# Patient Record
Sex: FEMALE | Race: BLACK OR AFRICAN AMERICAN | NOT HISPANIC OR LATINO | URBAN - METROPOLITAN AREA
[De-identification: names, ages, dates, MRNs, and addresses within clinical notes are randomized per-mention and may not be internally consistent; named-entity substitution may affect disease eponyms.]

---

## 2018-07-17 ENCOUNTER — EMERGENCY (EMERGENCY)
Facility: HOSPITAL | Age: 43
LOS: 1 days | Discharge: HOME | End: 2018-07-17
Attending: EMERGENCY MEDICINE | Admitting: EMERGENCY MEDICINE
Payer: MEDICAID

## 2018-07-17 VITALS
OXYGEN SATURATION: 100 % | TEMPERATURE: 98 F | RESPIRATION RATE: 18 BRPM | HEART RATE: 76 BPM | SYSTOLIC BLOOD PRESSURE: 113 MMHG | DIASTOLIC BLOOD PRESSURE: 65 MMHG

## 2018-07-17 VITALS
OXYGEN SATURATION: 99 % | DIASTOLIC BLOOD PRESSURE: 53 MMHG | HEART RATE: 71 BPM | SYSTOLIC BLOOD PRESSURE: 106 MMHG | RESPIRATION RATE: 17 BRPM

## 2018-07-17 DIAGNOSIS — Z98.890 OTHER SPECIFIED POSTPROCEDURAL STATES: Chronic | ICD-10-CM

## 2018-07-17 DIAGNOSIS — Z98.891 HISTORY OF UTERINE SCAR FROM PREVIOUS SURGERY: Chronic | ICD-10-CM

## 2018-07-17 LAB
ALBUMIN SERPL ELPH-MCNC: 4.9 G/DL — SIGNIFICANT CHANGE UP (ref 3.3–5)
ALP SERPL-CCNC: 67 U/L — SIGNIFICANT CHANGE UP (ref 40–120)
ALT FLD-CCNC: 9 U/L — SIGNIFICANT CHANGE UP (ref 4–33)
AST SERPL-CCNC: 46 U/L — HIGH (ref 4–32)
BASOPHILS # BLD AUTO: 0.08 K/UL — SIGNIFICANT CHANGE UP (ref 0–0.2)
BASOPHILS NFR BLD AUTO: 1.5 % — SIGNIFICANT CHANGE UP (ref 0–2)
BILIRUB SERPL-MCNC: 3.2 MG/DL — HIGH (ref 0.2–1.2)
BUN SERPL-MCNC: 9 MG/DL — SIGNIFICANT CHANGE UP (ref 7–23)
CALCIUM SERPL-MCNC: 9 MG/DL — SIGNIFICANT CHANGE UP (ref 8.4–10.5)
CHLORIDE SERPL-SCNC: 103 MMOL/L — SIGNIFICANT CHANGE UP (ref 98–107)
CO2 SERPL-SCNC: 23 MMOL/L — SIGNIFICANT CHANGE UP (ref 22–31)
CREAT SERPL-MCNC: 0.9 MG/DL — SIGNIFICANT CHANGE UP (ref 0.5–1.3)
EOSINOPHIL # BLD AUTO: 0.49 K/UL — SIGNIFICANT CHANGE UP (ref 0–0.5)
EOSINOPHIL NFR BLD AUTO: 9.5 % — HIGH (ref 0–6)
GLUCOSE SERPL-MCNC: 88 MG/DL — SIGNIFICANT CHANGE UP (ref 70–99)
HCT VFR BLD CALC: 23.9 % — LOW (ref 34.5–45)
HGB BLD-MCNC: 8.1 G/DL — LOW (ref 11.5–15.5)
IMM GRANULOCYTES # BLD AUTO: 0.02 # — SIGNIFICANT CHANGE UP
IMM GRANULOCYTES NFR BLD AUTO: 0.4 % — SIGNIFICANT CHANGE UP (ref 0–1.5)
LYMPHOCYTES # BLD AUTO: 2.49 K/UL — SIGNIFICANT CHANGE UP (ref 1–3.3)
LYMPHOCYTES # BLD AUTO: 48.1 % — HIGH (ref 13–44)
MCHC RBC-ENTMCNC: 25.6 PG — LOW (ref 27–34)
MCHC RBC-ENTMCNC: 33.9 % — SIGNIFICANT CHANGE UP (ref 32–36)
MCV RBC AUTO: 75.6 FL — LOW (ref 80–100)
MONOCYTES # BLD AUTO: 0.67 K/UL — SIGNIFICANT CHANGE UP (ref 0–0.9)
MONOCYTES NFR BLD AUTO: 12.9 % — SIGNIFICANT CHANGE UP (ref 2–14)
NEUTROPHILS # BLD AUTO: 1.43 K/UL — LOW (ref 1.8–7.4)
NEUTROPHILS NFR BLD AUTO: 27.6 % — LOW (ref 43–77)
NRBC # FLD: 0.06 — SIGNIFICANT CHANGE UP
NRBC FLD-RTO: 1.2 — SIGNIFICANT CHANGE UP
PLATELET # BLD AUTO: 259 K/UL — SIGNIFICANT CHANGE UP (ref 150–400)
PMV BLD: 9.4 FL — SIGNIFICANT CHANGE UP (ref 7–13)
POTASSIUM SERPL-MCNC: 4.2 MMOL/L — SIGNIFICANT CHANGE UP (ref 3.5–5.3)
POTASSIUM SERPL-SCNC: 4.2 MMOL/L — SIGNIFICANT CHANGE UP (ref 3.5–5.3)
PROT SERPL-MCNC: 8.8 G/DL — HIGH (ref 6–8.3)
RBC # BLD: 3.16 M/UL — LOW (ref 3.8–5.2)
RBC # FLD: 13.3 % — SIGNIFICANT CHANGE UP (ref 10.3–14.5)
RETICS #: 126 K/UL — HIGH (ref 25–125)
RETICS/RBC NFR: 4 % — HIGH (ref 0.5–2.5)
SICKLE CELL DISEASE SCREENING TEST: POSITIVE — SIGNIFICANT CHANGE UP
SODIUM SERPL-SCNC: 139 MMOL/L — SIGNIFICANT CHANGE UP (ref 135–145)
WBC # BLD: 5.18 K/UL — SIGNIFICANT CHANGE UP (ref 3.8–10.5)
WBC # FLD AUTO: 5.18 K/UL — SIGNIFICANT CHANGE UP (ref 3.8–10.5)

## 2018-07-17 PROCEDURE — 99285 EMERGENCY DEPT VISIT HI MDM: CPT

## 2018-07-17 RX ORDER — METOCLOPRAMIDE HCL 10 MG
10 TABLET ORAL ONCE
Qty: 0 | Refills: 0 | Status: COMPLETED | OUTPATIENT
Start: 2018-07-17 | End: 2018-07-17

## 2018-07-17 RX ORDER — HYDROMORPHONE HYDROCHLORIDE 2 MG/ML
1 INJECTION INTRAMUSCULAR; INTRAVENOUS; SUBCUTANEOUS ONCE
Qty: 0 | Refills: 0 | Status: DISCONTINUED | OUTPATIENT
Start: 2018-07-17 | End: 2018-07-17

## 2018-07-17 RX ORDER — MORPHINE SULFATE 50 MG/1
12 CAPSULE, EXTENDED RELEASE ORAL ONCE
Qty: 0 | Refills: 0 | Status: DISCONTINUED | OUTPATIENT
Start: 2018-07-17 | End: 2018-07-17

## 2018-07-17 RX ORDER — MORPHINE SULFATE 50 MG/1
8 CAPSULE, EXTENDED RELEASE ORAL ONCE
Qty: 0 | Refills: 0 | Status: DISCONTINUED | OUTPATIENT
Start: 2018-07-17 | End: 2018-07-17

## 2018-07-17 RX ORDER — METOCLOPRAMIDE HCL 10 MG
10 TABLET ORAL ONCE
Qty: 0 | Refills: 0 | Status: DISCONTINUED | OUTPATIENT
Start: 2018-07-17 | End: 2018-07-17

## 2018-07-17 RX ORDER — SODIUM CHLORIDE 9 MG/ML
1000 INJECTION INTRAMUSCULAR; INTRAVENOUS; SUBCUTANEOUS ONCE
Qty: 0 | Refills: 0 | Status: COMPLETED | OUTPATIENT
Start: 2018-07-17 | End: 2018-07-17

## 2018-07-17 RX ORDER — ACETAMINOPHEN 500 MG
975 TABLET ORAL ONCE
Qty: 0 | Refills: 0 | Status: COMPLETED | OUTPATIENT
Start: 2018-07-17 | End: 2018-07-17

## 2018-07-17 RX ORDER — DIPHENHYDRAMINE HCL 50 MG
25 CAPSULE ORAL EVERY 4 HOURS
Qty: 0 | Refills: 0 | Status: DISCONTINUED | OUTPATIENT
Start: 2018-07-17 | End: 2018-07-21

## 2018-07-17 RX ADMIN — Medication 25 MILLIGRAM(S): at 15:40

## 2018-07-17 RX ADMIN — Medication 10 MILLIGRAM(S): at 16:41

## 2018-07-17 RX ADMIN — MORPHINE SULFATE 12 MILLIGRAM(S): 50 CAPSULE, EXTENDED RELEASE ORAL at 17:50

## 2018-07-17 RX ADMIN — MORPHINE SULFATE 8 MILLIGRAM(S): 50 CAPSULE, EXTENDED RELEASE ORAL at 15:40

## 2018-07-17 RX ADMIN — Medication 975 MILLIGRAM(S): at 17:49

## 2018-07-17 RX ADMIN — SODIUM CHLORIDE 1000 MILLILITER(S): 9 INJECTION INTRAMUSCULAR; INTRAVENOUS; SUBCUTANEOUS at 15:41

## 2018-07-17 RX ADMIN — MORPHINE SULFATE 8 MILLIGRAM(S): 50 CAPSULE, EXTENDED RELEASE ORAL at 16:33

## 2018-07-17 NOTE — ED PROVIDER NOTE - PLAN OF CARE
You were treated today for a sickle cell pain crisis. You were given fluids and pain medications. You can continue your regular follow-up with your hematologist. If you have worsening pain, fever, headaches or chest pain, please return to the ED.

## 2018-07-17 NOTE — ED PROVIDER NOTE - OBJECTIVE STATEMENT
Patient has been having pain since two days ago localized to the head, mid back, and bl knees. The pain became more severe this morning. She is currently menstruating and had poor sleep last night which she says makes the pain worse. She has pain episodes around once a month. Her pain regimen for severe episodes is MSIR 30mg 2 tabs q4-6, MSContin 60mg bid, and fentanyl 50mcg patch if needed. She took her medications this morning with no relief and also placed a patch this morning. The pain is worst in bl temples, and her L knee. Her last admission for a sickle cell crisis was in January. Her hematologist is Dr. Salazar with [facility]. Per their PA, she was in the process of being evaluated for the l-glutamine treatment at [facility]. She has never been in our system as her hematologist previously administed IV narcotics. Her pain usually resolves with IV hydration and morphine. She denies fevers, CP, SOB, abdominal pain, changes to appetite or BM, or urinary changes.

## 2018-07-17 NOTE — ED PROVIDER NOTE - ATTENDING CONTRIBUTION TO CARE
I performed a face to face evaluation of this patient and performed a full history and physical examination on the patient.  I agree with the resident's history, physical examination, and plan of the patient. Pt with typical sickle cell crisis, nonfocal exam, heart and lungs wnl, abd soft nontender, neuro wnl- for labs, ivf pain meds and reassess

## 2018-07-17 NOTE — ED PROVIDER NOTE - CARE PLAN
Principal Discharge DX:	Hb-SS disease with crisis  Assessment and plan of treatment:	You were treated today for a sickle cell pain crisis. You were given fluids and pain medications. You can continue your regular follow-up with your hematologist. If you have worsening pain, fever, headaches or chest pain, please return to the ED.

## 2018-07-17 NOTE — ED PROVIDER NOTE - MEDICAL DECISION MAKING DETAILS
Pt with typical sickle cell crisis, nonfocal exam, heart and lungs wnl, abd soft nontender, neuro wnl- for labs, ivf pain meds and reassess

## 2018-07-17 NOTE — ED PROVIDER NOTE - CONSTITUTIONAL, MLM
- - - normal... Appears uncomfortable and in pain. Well appearing, well nourished, awake, alert, oriented to person, place, time/situation

## 2018-07-17 NOTE — ED ADULT NURSE NOTE - OBJECTIVE STATEMENT
Break RN: Pt aox3, ambulatory, presents to the ED for possible sickle cell crisis, reports pain on the head, mid upper back, both knees x 1 week, took home meds with no relief. Pt denies chest pain, SOB, abd pain, fevers, chills. MD arango done, 22G placed on R AC, labs sent, NAD.

## 2018-07-17 NOTE — ED PROVIDER NOTE - CHIEF COMPLAINT
The patient is a 42y Female complaining of sickle cell pain crisis. The patient is a 41yo woman complaining of sickle cell pain crisis.

## 2018-07-17 NOTE — ED ADULT NURSE REASSESSMENT NOTE - NS ED NURSE REASSESS COMMENT FT1
Pt. is alert and oriented x 4, vss. C/o pain in her head, back and left leg, medicated as ordered will continue to monitor.

## 2018-08-21 PROBLEM — D57.00 HB-SS DISEASE WITH CRISIS, UNSPECIFIED: Chronic | Status: ACTIVE | Noted: 2018-07-17

## 2018-08-21 PROBLEM — Z00.00 ENCOUNTER FOR PREVENTIVE HEALTH EXAMINATION: Status: ACTIVE | Noted: 2018-08-21

## 2018-08-24 ENCOUNTER — INPATIENT (INPATIENT)
Facility: HOSPITAL | Age: 43
LOS: 2 days | Discharge: HOME | End: 2018-08-27
Attending: STUDENT IN AN ORGANIZED HEALTH CARE EDUCATION/TRAINING PROGRAM | Admitting: STUDENT IN AN ORGANIZED HEALTH CARE EDUCATION/TRAINING PROGRAM
Payer: MEDICAID

## 2018-08-24 VITALS
RESPIRATION RATE: 14 BRPM | OXYGEN SATURATION: 100 % | HEART RATE: 72 BPM | DIASTOLIC BLOOD PRESSURE: 61 MMHG | TEMPERATURE: 99 F | SYSTOLIC BLOOD PRESSURE: 115 MMHG

## 2018-08-24 DIAGNOSIS — Z98.890 OTHER SPECIFIED POSTPROCEDURAL STATES: Chronic | ICD-10-CM

## 2018-08-24 DIAGNOSIS — Z98.891 HISTORY OF UTERINE SCAR FROM PREVIOUS SURGERY: Chronic | ICD-10-CM

## 2018-08-24 LAB
ALBUMIN SERPL ELPH-MCNC: 4.3 G/DL — SIGNIFICANT CHANGE UP (ref 3.3–5)
ALP SERPL-CCNC: 58 U/L — SIGNIFICANT CHANGE UP (ref 40–120)
ALT FLD-CCNC: 9 U/L — SIGNIFICANT CHANGE UP (ref 4–33)
AST SERPL-CCNC: 35 U/L — HIGH (ref 4–32)
BASOPHILS # BLD AUTO: 0.07 K/UL — SIGNIFICANT CHANGE UP (ref 0–0.2)
BASOPHILS NFR BLD AUTO: 1 % — SIGNIFICANT CHANGE UP (ref 0–2)
BILIRUB SERPL-MCNC: 2.6 MG/DL — HIGH (ref 0.2–1.2)
BUN SERPL-MCNC: 9 MG/DL — SIGNIFICANT CHANGE UP (ref 7–23)
CALCIUM SERPL-MCNC: 8.9 MG/DL — SIGNIFICANT CHANGE UP (ref 8.4–10.5)
CHLORIDE SERPL-SCNC: 102 MMOL/L — SIGNIFICANT CHANGE UP (ref 98–107)
CO2 SERPL-SCNC: 21 MMOL/L — LOW (ref 22–31)
CREAT SERPL-MCNC: 0.99 MG/DL — SIGNIFICANT CHANGE UP (ref 0.5–1.3)
EOSINOPHIL # BLD AUTO: 0.57 K/UL — HIGH (ref 0–0.5)
EOSINOPHIL NFR BLD AUTO: 8.3 % — HIGH (ref 0–6)
GLUCOSE SERPL-MCNC: 93 MG/DL — SIGNIFICANT CHANGE UP (ref 70–99)
HCT VFR BLD CALC: 21.7 % — LOW (ref 34.5–45)
HGB BLD-MCNC: 7.1 G/DL — LOW (ref 11.5–15.5)
IMM GRANULOCYTES # BLD AUTO: 0.02 # — SIGNIFICANT CHANGE UP
IMM GRANULOCYTES NFR BLD AUTO: 0.3 % — SIGNIFICANT CHANGE UP (ref 0–1.5)
LYMPHOCYTES # BLD AUTO: 3.2 K/UL — SIGNIFICANT CHANGE UP (ref 1–3.3)
LYMPHOCYTES # BLD AUTO: 46.6 % — HIGH (ref 13–44)
MCHC RBC-ENTMCNC: 25.4 PG — LOW (ref 27–34)
MCHC RBC-ENTMCNC: 32.7 % — SIGNIFICANT CHANGE UP (ref 32–36)
MCV RBC AUTO: 77.5 FL — LOW (ref 80–100)
MONOCYTES # BLD AUTO: 0.76 K/UL — SIGNIFICANT CHANGE UP (ref 0–0.9)
MONOCYTES NFR BLD AUTO: 11.1 % — SIGNIFICANT CHANGE UP (ref 2–14)
NEUTROPHILS # BLD AUTO: 2.24 K/UL — SIGNIFICANT CHANGE UP (ref 1.8–7.4)
NEUTROPHILS NFR BLD AUTO: 32.7 % — LOW (ref 43–77)
NRBC # FLD: 0.08 — SIGNIFICANT CHANGE UP
NRBC FLD-RTO: 1.2 — SIGNIFICANT CHANGE UP
PLATELET # BLD AUTO: 223 K/UL — SIGNIFICANT CHANGE UP (ref 150–400)
PMV BLD: 9.2 FL — SIGNIFICANT CHANGE UP (ref 7–13)
POTASSIUM SERPL-MCNC: 4.2 MMOL/L — SIGNIFICANT CHANGE UP (ref 3.5–5.3)
POTASSIUM SERPL-SCNC: 4.2 MMOL/L — SIGNIFICANT CHANGE UP (ref 3.5–5.3)
PROT SERPL-MCNC: 7.7 G/DL — SIGNIFICANT CHANGE UP (ref 6–8.3)
RBC # BLD: 2.8 M/UL — LOW (ref 3.8–5.2)
RBC # FLD: 14 % — SIGNIFICANT CHANGE UP (ref 10.3–14.5)
RETICS #: 162 K/UL — HIGH (ref 25–125)
RETICS/RBC NFR: 5.8 % — HIGH (ref 0.5–2.5)
SODIUM SERPL-SCNC: 139 MMOL/L — SIGNIFICANT CHANGE UP (ref 135–145)
TROPONIN T, HIGH SENSITIVITY: < 6 NG/L — SIGNIFICANT CHANGE UP (ref ?–14)
WBC # BLD: 6.86 K/UL — SIGNIFICANT CHANGE UP (ref 3.8–10.5)
WBC # FLD AUTO: 6.86 K/UL — SIGNIFICANT CHANGE UP (ref 3.8–10.5)

## 2018-08-24 PROCEDURE — 71046 X-RAY EXAM CHEST 2 VIEWS: CPT | Mod: 26

## 2018-08-24 PROCEDURE — 73564 X-RAY EXAM KNEE 4 OR MORE: CPT | Mod: 26,50

## 2018-08-24 RX ORDER — DIPHENHYDRAMINE HCL 50 MG
50 CAPSULE ORAL ONCE
Qty: 0 | Refills: 0 | Status: COMPLETED | OUTPATIENT
Start: 2018-08-24 | End: 2018-08-24

## 2018-08-24 RX ORDER — KETOROLAC TROMETHAMINE 30 MG/ML
30 SYRINGE (ML) INJECTION ONCE
Qty: 0 | Refills: 0 | Status: DISCONTINUED | OUTPATIENT
Start: 2018-08-24 | End: 2018-08-24

## 2018-08-24 RX ORDER — HYDROMORPHONE HYDROCHLORIDE 2 MG/ML
4 INJECTION INTRAMUSCULAR; INTRAVENOUS; SUBCUTANEOUS ONCE
Qty: 0 | Refills: 0 | Status: DISCONTINUED | OUTPATIENT
Start: 2018-08-24 | End: 2018-08-25

## 2018-08-24 RX ORDER — HYDROMORPHONE HYDROCHLORIDE 2 MG/ML
1 INJECTION INTRAMUSCULAR; INTRAVENOUS; SUBCUTANEOUS ONCE
Qty: 0 | Refills: 0 | Status: DISCONTINUED | OUTPATIENT
Start: 2018-08-24 | End: 2018-08-24

## 2018-08-24 RX ORDER — HYDROMORPHONE HYDROCHLORIDE 2 MG/ML
2 INJECTION INTRAMUSCULAR; INTRAVENOUS; SUBCUTANEOUS ONCE
Qty: 0 | Refills: 0 | Status: DISCONTINUED | OUTPATIENT
Start: 2018-08-24 | End: 2018-08-24

## 2018-08-24 RX ORDER — SODIUM CHLORIDE 9 MG/ML
1000 INJECTION INTRAMUSCULAR; INTRAVENOUS; SUBCUTANEOUS ONCE
Qty: 0 | Refills: 0 | Status: COMPLETED | OUTPATIENT
Start: 2018-08-24 | End: 2018-08-24

## 2018-08-24 RX ADMIN — Medication 50 MILLIGRAM(S): at 23:29

## 2018-08-24 RX ADMIN — SODIUM CHLORIDE 1000 MILLILITER(S): 9 INJECTION INTRAMUSCULAR; INTRAVENOUS; SUBCUTANEOUS at 23:29

## 2018-08-24 RX ADMIN — HYDROMORPHONE HYDROCHLORIDE 2 MILLIGRAM(S): 2 INJECTION INTRAMUSCULAR; INTRAVENOUS; SUBCUTANEOUS at 23:59

## 2018-08-24 RX ADMIN — HYDROMORPHONE HYDROCHLORIDE 2 MILLIGRAM(S): 2 INJECTION INTRAMUSCULAR; INTRAVENOUS; SUBCUTANEOUS at 23:28

## 2018-08-24 RX ADMIN — Medication 30 MILLIGRAM(S): at 23:29

## 2018-08-24 RX ADMIN — Medication 30 MILLIGRAM(S): at 23:59

## 2018-08-24 NOTE — ED PROVIDER NOTE - ATTENDING CONTRIBUTION TO CARE
Dr. Varghese:  I have personally performed a face to face bedside history and physical examination of this patient. I have discussed the history, examination, review of systems, assessment and plan of management with the resident. I have reviewed the electronic medical record and amended it to reflect my history, review of systems, physical exam, assessment and plan.    42F h/o sickle cell presents with c/o bilateral knee pain and thoracic pain, typical of her pain crisis.  Pain has been worsening over past few days.  Denies other acute symptoms.    Exam:  - nad  - rrr   -ctab  - abd soft ntnd    A/P  - pain crisis  - basic labs, retic count  - pain control

## 2018-08-24 NOTE — ED ADULT NURSE NOTE - NSIMPLEMENTINTERV_GEN_ALL_ED
Implemented All Universal Safety Interventions:  Breeding to call system. Call bell, personal items and telephone within reach. Instruct patient to call for assistance. Room bathroom lighting operational. Non-slip footwear when patient is off stretcher. Physically safe environment: no spills, clutter or unnecessary equipment. Stretcher in lowest position, wheels locked, appropriate side rails in place.

## 2018-08-24 NOTE — ED PROVIDER NOTE - NS ED ROS FT
GENERAL: No fever or chills, //             EYES: no change in vision, //             HEENT: no trouble swallowing or speaking, //             CARDIAC: no chest pain, //              PULMONARY: no cough or SOB, //             GI: no abdominal pain, no nausea or no vomiting, no diarrhea or constipation, //             : No changes in urination,  //            SKIN: no rashes,  //            NEURO: no headache,  otherwise as HPI or negative. ~Daina Cota M.D., Ph.D. -Resident

## 2018-08-24 NOTE — ED PROVIDER NOTE - PHYSICAL EXAMINATION
Gen: NAD, AOx3, non-toxic //            Head: NCAT //            HEENT: EOMI, oral mucosa dry, normal conjunctiva //            Lung: CTAB, no respiratory distress, no wheezes/rhonchi/rales B/L, speaking in full sentences. //            CV: RRR, no murmurs, rubs or gallops //            Abd: soft, NTND, no guarding, no CVA tenderness //            MSK: tenderness to palpation over thoracic spine (lower thoracic) and over bilateral knees with pain on ROM, no obvious deformity or trauma.  //            Neuro: No focal sensory or motor deficits //            Skin: Warm, well perfused, no rash //            Psych: normal affect. ~Daina Cota M.D., Ph.D. -Resident

## 2018-08-24 NOTE — ED ADULT NURSE NOTE - OBJECTIVE STATEMENT
Patient received in room #5 c/o SCC with pain to chest, back and B/L LE x2days. Patient A&OX3, ambulatory. Patient reports taking pain medication at home with no relief. Patient denies any sob. MD at bedside for US IV. VS as noted. Will monitor.

## 2018-08-24 NOTE — ED PROVIDER NOTE - MEDICAL DECISION MAKING DETAILS
patient with SS here for SS crisis. likely 2/2 dehydration.  Will do basic labs, retic count, fluids, imaging of pain loci, dispo pending.

## 2018-08-24 NOTE — ED PROVIDER NOTE - OBJECTIVE STATEMENT
42 female history of Sickle cell SS here for knee and back pain. Onset few days ago, intermittent, was moderate now severe, nonradiating. Not improved with her 60 mg home dose of morphine. Saw her hematologist 2 days ago regarding pain who checked H/H (stable at baseline) and told her to come to ER if gets worse.

## 2018-08-24 NOTE — ED ADULT TRIAGE NOTE - CHIEF COMPLAINT QUOTE
"I am having a sickle cell crisis". P c/o bilateral knee pain, lower and upper back pain, and midsternal chest pain x 2 days, worsening in severity. Recent blood work per patient Hbg 7.0.

## 2018-08-25 DIAGNOSIS — Z29.9 ENCOUNTER FOR PROPHYLACTIC MEASURES, UNSPECIFIED: ICD-10-CM

## 2018-08-25 DIAGNOSIS — D57.00 HB-SS DISEASE WITH CRISIS, UNSPECIFIED: ICD-10-CM

## 2018-08-25 PROCEDURE — 99223 1ST HOSP IP/OBS HIGH 75: CPT

## 2018-08-25 RX ORDER — SODIUM CHLORIDE 9 MG/ML
1000 INJECTION INTRAMUSCULAR; INTRAVENOUS; SUBCUTANEOUS ONCE
Qty: 0 | Refills: 0 | Status: COMPLETED | OUTPATIENT
Start: 2018-08-25 | End: 2018-08-25

## 2018-08-25 RX ORDER — HYDROMORPHONE HYDROCHLORIDE 2 MG/ML
3 INJECTION INTRAMUSCULAR; INTRAVENOUS; SUBCUTANEOUS ONCE
Qty: 0 | Refills: 0 | Status: DISCONTINUED | OUTPATIENT
Start: 2018-08-25 | End: 2018-08-25

## 2018-08-25 RX ORDER — HYDROMORPHONE HYDROCHLORIDE 2 MG/ML
1 INJECTION INTRAMUSCULAR; INTRAVENOUS; SUBCUTANEOUS ONCE
Qty: 0 | Refills: 0 | Status: DISCONTINUED | OUTPATIENT
Start: 2018-08-25 | End: 2018-08-25

## 2018-08-25 RX ORDER — HYDROMORPHONE HYDROCHLORIDE 2 MG/ML
1 INJECTION INTRAMUSCULAR; INTRAVENOUS; SUBCUTANEOUS
Qty: 0 | Refills: 0 | Status: DISCONTINUED | OUTPATIENT
Start: 2018-08-25 | End: 2018-08-25

## 2018-08-25 RX ORDER — ONDANSETRON 8 MG/1
4 TABLET, FILM COATED ORAL EVERY 6 HOURS
Qty: 0 | Refills: 0 | Status: DISCONTINUED | OUTPATIENT
Start: 2018-08-25 | End: 2018-08-27

## 2018-08-25 RX ORDER — ENOXAPARIN SODIUM 100 MG/ML
40 INJECTION SUBCUTANEOUS EVERY 24 HOURS
Qty: 0 | Refills: 0 | Status: DISCONTINUED | OUTPATIENT
Start: 2018-08-25 | End: 2018-08-27

## 2018-08-25 RX ORDER — HYDROMORPHONE HYDROCHLORIDE 2 MG/ML
30 INJECTION INTRAMUSCULAR; INTRAVENOUS; SUBCUTANEOUS
Qty: 0 | Refills: 0 | Status: DISCONTINUED | OUTPATIENT
Start: 2018-08-25 | End: 2018-08-27

## 2018-08-25 RX ORDER — NALOXONE HYDROCHLORIDE 4 MG/.1ML
0.1 SPRAY NASAL
Qty: 0 | Refills: 0 | Status: DISCONTINUED | OUTPATIENT
Start: 2018-08-25 | End: 2018-08-27

## 2018-08-25 RX ORDER — FENTANYL CITRATE 50 UG/ML
1 INJECTION INTRAVENOUS
Qty: 0 | Refills: 0 | Status: DISCONTINUED | OUTPATIENT
Start: 2018-08-26 | End: 2018-08-27

## 2018-08-25 RX ORDER — FOLIC ACID 0.8 MG
1 TABLET ORAL DAILY
Qty: 0 | Refills: 0 | Status: DISCONTINUED | OUTPATIENT
Start: 2018-08-25 | End: 2018-08-27

## 2018-08-25 RX ORDER — DIPHENHYDRAMINE HCL 50 MG
25 CAPSULE ORAL EVERY 4 HOURS
Qty: 0 | Refills: 0 | Status: DISCONTINUED | OUTPATIENT
Start: 2018-08-25 | End: 2018-08-27

## 2018-08-25 RX ORDER — MORPHINE SULFATE 50 MG/1
60 CAPSULE, EXTENDED RELEASE ORAL EVERY 12 HOURS
Qty: 0 | Refills: 0 | Status: DISCONTINUED | OUTPATIENT
Start: 2018-08-25 | End: 2018-08-27

## 2018-08-25 RX ORDER — SODIUM CHLORIDE 9 MG/ML
1000 INJECTION, SOLUTION INTRAVENOUS
Qty: 0 | Refills: 0 | Status: DISCONTINUED | OUTPATIENT
Start: 2018-08-25 | End: 2018-08-27

## 2018-08-25 RX ORDER — HYDROXYUREA 500 MG/1
1000 CAPSULE ORAL DAILY
Qty: 0 | Refills: 0 | Status: DISCONTINUED | OUTPATIENT
Start: 2018-08-25 | End: 2018-08-27

## 2018-08-25 RX ADMIN — Medication 1 MILLIGRAM(S): at 17:57

## 2018-08-25 RX ADMIN — MORPHINE SULFATE 60 MILLIGRAM(S): 50 CAPSULE, EXTENDED RELEASE ORAL at 17:57

## 2018-08-25 RX ADMIN — HYDROMORPHONE HYDROCHLORIDE 1 MILLIGRAM(S): 2 INJECTION INTRAMUSCULAR; INTRAVENOUS; SUBCUTANEOUS at 04:59

## 2018-08-25 RX ADMIN — HYDROMORPHONE HYDROCHLORIDE 30 MILLILITER(S): 2 INJECTION INTRAMUSCULAR; INTRAVENOUS; SUBCUTANEOUS at 20:01

## 2018-08-25 RX ADMIN — HYDROMORPHONE HYDROCHLORIDE 1 MILLIGRAM(S): 2 INJECTION INTRAMUSCULAR; INTRAVENOUS; SUBCUTANEOUS at 12:00

## 2018-08-25 RX ADMIN — SODIUM CHLORIDE 1000 MILLILITER(S): 9 INJECTION INTRAMUSCULAR; INTRAVENOUS; SUBCUTANEOUS at 00:45

## 2018-08-25 RX ADMIN — HYDROMORPHONE HYDROCHLORIDE 1 MILLIGRAM(S): 2 INJECTION INTRAMUSCULAR; INTRAVENOUS; SUBCUTANEOUS at 08:05

## 2018-08-25 RX ADMIN — HYDROXYUREA 1000 MILLIGRAM(S): 500 CAPSULE ORAL at 17:57

## 2018-08-25 RX ADMIN — HYDROMORPHONE HYDROCHLORIDE 1 MILLIGRAM(S): 2 INJECTION INTRAMUSCULAR; INTRAVENOUS; SUBCUTANEOUS at 07:52

## 2018-08-25 RX ADMIN — HYDROMORPHONE HYDROCHLORIDE 3 MILLIGRAM(S): 2 INJECTION INTRAMUSCULAR; INTRAVENOUS; SUBCUTANEOUS at 00:53

## 2018-08-25 RX ADMIN — SODIUM CHLORIDE 1000 MILLILITER(S): 9 INJECTION INTRAMUSCULAR; INTRAVENOUS; SUBCUTANEOUS at 01:02

## 2018-08-25 RX ADMIN — HYDROMORPHONE HYDROCHLORIDE 1 MILLIGRAM(S): 2 INJECTION INTRAMUSCULAR; INTRAVENOUS; SUBCUTANEOUS at 11:47

## 2018-08-25 RX ADMIN — HYDROMORPHONE HYDROCHLORIDE 30 MILLILITER(S): 2 INJECTION INTRAMUSCULAR; INTRAVENOUS; SUBCUTANEOUS at 15:20

## 2018-08-25 RX ADMIN — HYDROMORPHONE HYDROCHLORIDE 1 MILLIGRAM(S): 2 INJECTION INTRAMUSCULAR; INTRAVENOUS; SUBCUTANEOUS at 15:02

## 2018-08-25 RX ADMIN — HYDROMORPHONE HYDROCHLORIDE 1 MILLIGRAM(S): 2 INJECTION INTRAMUSCULAR; INTRAVENOUS; SUBCUTANEOUS at 04:29

## 2018-08-25 RX ADMIN — SODIUM CHLORIDE 1000 MILLILITER(S): 9 INJECTION INTRAMUSCULAR; INTRAVENOUS; SUBCUTANEOUS at 04:25

## 2018-08-25 RX ADMIN — SODIUM CHLORIDE 1000 MILLILITER(S): 9 INJECTION INTRAMUSCULAR; INTRAVENOUS; SUBCUTANEOUS at 04:31

## 2018-08-25 RX ADMIN — HYDROMORPHONE HYDROCHLORIDE 3 MILLIGRAM(S): 2 INJECTION INTRAMUSCULAR; INTRAVENOUS; SUBCUTANEOUS at 00:23

## 2018-08-25 RX ADMIN — SODIUM CHLORIDE 100 MILLILITER(S): 9 INJECTION, SOLUTION INTRAVENOUS at 06:18

## 2018-08-25 RX ADMIN — SODIUM CHLORIDE 1000 MILLILITER(S): 9 INJECTION INTRAMUSCULAR; INTRAVENOUS; SUBCUTANEOUS at 01:25

## 2018-08-25 RX ADMIN — HYDROMORPHONE HYDROCHLORIDE 1 MILLIGRAM(S): 2 INJECTION INTRAMUSCULAR; INTRAVENOUS; SUBCUTANEOUS at 14:50

## 2018-08-25 RX ADMIN — ENOXAPARIN SODIUM 40 MILLIGRAM(S): 100 INJECTION SUBCUTANEOUS at 15:19

## 2018-08-25 NOTE — H&P ADULT - PROBLEM SELECTOR PLAN 1
Patient presents with 2 days of bilateral knee pain typical of her usual crises. Unclear precipitant. Hgb 8.1-->7.1, likely dilutional 2/2 IV fluids. Elevated Tbili suggestive of active hemolysis. Appropriate reticulocytosis noted; low suspicion for aplastic crisis. CXR with clear lungs; inconsistent with acute chest syndrome.    -start Dilaudid PCA  -resume home dose morphine ER 60mg q12h (verified on iSTOP)  -resume home dose Fentanyl 50mcg q72h (verified on iSTOP)  -c/w home dose folate, hydroxyurea  -IV 0.45% NaCl at 100cc/hr

## 2018-08-25 NOTE — H&P ADULT - ASSESSMENT
42 W PMH sickle cell dz presents with 2-day c/o knee pain. Afebrile, normotensive, normal HR. O2 sat 100% RA. On exam, she is alert, oriented, lungs are clear, she is in mild distress due to pain. Hgb 7.1, abs retic 162, sCr 0.99, TBili 2.6. CXR shows clear lungs. Xray knees without fractures or gross e/o AVN.

## 2018-08-25 NOTE — H&P ADULT - HISTORY OF PRESENT ILLNESS
HPI:    42 W PMH sickle cell dz presents with 2-day c/o knee pain. Patient states knee pain is bilateral, 9/10 at its worst, and sharp in character. Pain is typical of her usual sickle cell pain. She tried taking her home doses of MS Contin, morphine IR, and Fentanyl patch, but these did not provide adequate pain relief so she came to the emergency room. IV Dilaudid given in the ED provided pain relief. She reports no chest pain or shortness of breath.     PAST MEDICAL & SURGICAL HISTORY:  Hb-SS disease with crisis  S/P D&C (status post dilation and curettage)  H/O  section      Review of Systems:   CONSTITUTIONAL: No fever, weight loss, or fatigue  EYES: No eye pain, visual disturbances, or discharge  ENMT:  No difficulty hearing, tinnitus, vertigo; No sinus or throat pain  NECK: No pain or stiffness  BREASTS: No pain, masses, or nipple discharge  RESPIRATORY: No cough, wheezing, chills or hemoptysis; No shortness of breath  CARDIOVASCULAR: No chest pain, palpitations, dizziness, or leg swelling  GASTROINTESTINAL: No abdominal or epigastric pain. No nausea, vomiting, or hematemesis; No diarrhea or constipation. No melena or hematochezia.  GENITOURINARY: No dysuria, frequency, hematuria, or incontinence  NEUROLOGICAL: No headaches, memory loss, loss of strength, numbness, or tremors  SKIN: No itching, burning, rashes, or lesions   LYMPH NODES: No enlarged glands  ENDOCRINE: No heat or cold intolerance; No hair loss  MUSCULOSKELETAL: +bilateral knee pain; No muscle, back, or extremity pain  PSYCHIATRIC: No depression, anxiety, mood swings, or difficulty sleeping  HEME/LYMPH: No easy bruising, or bleeding gums  ALLERGY AND IMMUNOLOGIC: No hives or eczema    Allergies    No Known Allergies    Intolerances        Social History:   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 Lives with her children and parents. Quit smoking 4 years ago, smoked 1/2 ppd for 7 years before that. Does not drink or use drugs.    FAMILY HISTORY:  No pertinent family history in first degree relatives      MEDICATIONS  (STANDING):  enoxaparin Injectable 40 milliGRAM(s) SubCutaneous every 24 hours  folic acid 1 milliGRAM(s) Oral daily  HYDROmorphone PCA (1 mG/mL) 30 milliLiter(s) PCA Continuous PCA Continuous  hydroxyurea (Non - oncologic) 1000 milliGRAM(s) Oral daily  morphine ER Tablet 60 milliGRAM(s) Oral every 12 hours  sodium chloride 0.45%. 1000 milliLiter(s) (100 mL/Hr) IV Continuous <Continuous>    MEDICATIONS  (PRN):  diphenhydrAMINE   Capsule 25 milliGRAM(s) Oral every 4 hours PRN Pruritus  naloxone Injectable 0.1 milliGRAM(s) IV Push every 3 minutes PRN For ANY of the following changes in patient status:  A. RR LESS THAN 10 breaths per minute, B. Oxygen saturation LESS THAN 90%, C. Sedation score of 6  ondansetron Injectable 4 milliGRAM(s) IV Push every 6 hours PRN Nausea      T(C): 36.7 (18 @ 13:34), Max: 37.3 (18 @ 23:30)  HR: 65 (18 @ 13:34) (65 - 75)  BP: 100/58 (18 @ 13:34) (98/62 - 115/61)  RR: 16 (18 @ 13:34) (14 - 18)  SpO2: 100% (18 @ 13:34) (100% - 100%)    CAPILLARY BLOOD GLUCOSE        I&O's Summary      PHYSICAL EXAM:  GENERAL: WN/WD woman seated in bed, in mild distress due to pain  HEAD:  Atraumatic, Normocephalic, moist oral mucosa  EYES: EOMI, PERRLA, conjunctiva and sclera clear  NECK: Supple, No elevated JVD  CHEST/LUNG: Clear to auscultation bilaterally; No wheeze  HEART: Regular rate and rhythm; No murmurs, rubs, or gallops  ABDOMEN: Soft, Nontender, Nondistended; Bowel sounds present  EXTREMITIES:  2+ Peripheral Pulses, No clubbing, cyanosis, or edema; no knee effusions  PSYCH: AAOx3  NEUROLOGY: CN II-XII grossly intact, moving all extremities  SKIN: No rashes or lesions    LABS:                        7.1    6.86  )-----------( 223      ( 24 Aug 2018 22:30 )             21.7         139  |  102  |  9   ----------------------------<  93  4.2   |  21<L>  |  0.99    Ca    8.9      24 Aug 2018 22:30    TPro  7.7  /  Alb  4.3  /  TBili  2.6<H>  /  DBili  x   /  AST  35<H>  /  ALT  9   /  AlkPhos  58  08-24                RADIOLOGY & ADDITIONAL TESTS:    ECG Personally Reviewed - NSR, rate 68    Imaging Personally Reviewed: CXR clear lungs, bilateral humeral heads with AVN     Imaging Reviewed: Knee x-ray, bilateral: no fractures, no gross e/o AVN    Consultant(s) Notes Reviewed:      Care Discussed with Consultants/Other Providers:

## 2018-08-26 LAB
BASOPHILS # BLD AUTO: 0.05 K/UL — SIGNIFICANT CHANGE UP (ref 0–0.2)
BASOPHILS NFR BLD AUTO: 0.7 % — SIGNIFICANT CHANGE UP (ref 0–2)
BUN SERPL-MCNC: 7 MG/DL — SIGNIFICANT CHANGE UP (ref 7–23)
CALCIUM SERPL-MCNC: 8.4 MG/DL — SIGNIFICANT CHANGE UP (ref 8.4–10.5)
CHLORIDE SERPL-SCNC: 107 MMOL/L — SIGNIFICANT CHANGE UP (ref 98–107)
CO2 SERPL-SCNC: 22 MMOL/L — SIGNIFICANT CHANGE UP (ref 22–31)
CREAT SERPL-MCNC: 0.87 MG/DL — SIGNIFICANT CHANGE UP (ref 0.5–1.3)
EOSINOPHIL # BLD AUTO: 0.56 K/UL — HIGH (ref 0–0.5)
EOSINOPHIL NFR BLD AUTO: 8.1 % — HIGH (ref 0–6)
GLUCOSE SERPL-MCNC: 110 MG/DL — HIGH (ref 70–99)
HCT VFR BLD CALC: 19.7 % — CRITICAL LOW (ref 34.5–45)
HGB BLD-MCNC: 6.7 G/DL — CRITICAL LOW (ref 11.5–15.5)
IMM GRANULOCYTES # BLD AUTO: 0.03 # — SIGNIFICANT CHANGE UP
IMM GRANULOCYTES NFR BLD AUTO: 0.4 % — SIGNIFICANT CHANGE UP (ref 0–1.5)
LYMPHOCYTES # BLD AUTO: 3.32 K/UL — HIGH (ref 1–3.3)
LYMPHOCYTES # BLD AUTO: 47.8 % — HIGH (ref 13–44)
MCHC RBC-ENTMCNC: 26.3 PG — LOW (ref 27–34)
MCHC RBC-ENTMCNC: 34 % — SIGNIFICANT CHANGE UP (ref 32–36)
MCV RBC AUTO: 77.3 FL — LOW (ref 80–100)
MONOCYTES # BLD AUTO: 0.65 K/UL — SIGNIFICANT CHANGE UP (ref 0–0.9)
MONOCYTES NFR BLD AUTO: 9.4 % — SIGNIFICANT CHANGE UP (ref 2–14)
NEUTROPHILS # BLD AUTO: 2.34 K/UL — SIGNIFICANT CHANGE UP (ref 1.8–7.4)
NEUTROPHILS NFR BLD AUTO: 33.6 % — LOW (ref 43–77)
NRBC # FLD: 0.09 — SIGNIFICANT CHANGE UP
NRBC FLD-RTO: 1.3 — SIGNIFICANT CHANGE UP
PLATELET # BLD AUTO: 210 K/UL — SIGNIFICANT CHANGE UP (ref 150–400)
PMV BLD: 9.6 FL — SIGNIFICANT CHANGE UP (ref 7–13)
POTASSIUM SERPL-MCNC: 4 MMOL/L — SIGNIFICANT CHANGE UP (ref 3.5–5.3)
POTASSIUM SERPL-SCNC: 4 MMOL/L — SIGNIFICANT CHANGE UP (ref 3.5–5.3)
RBC # BLD: 2.55 M/UL — LOW (ref 3.8–5.2)
RBC # FLD: 15 % — HIGH (ref 10.3–14.5)
SODIUM SERPL-SCNC: 140 MMOL/L — SIGNIFICANT CHANGE UP (ref 135–145)
WBC # BLD: 6.95 K/UL — SIGNIFICANT CHANGE UP (ref 3.8–10.5)
WBC # FLD AUTO: 6.95 K/UL — SIGNIFICANT CHANGE UP (ref 3.8–10.5)

## 2018-08-26 PROCEDURE — 99232 SBSQ HOSP IP/OBS MODERATE 35: CPT

## 2018-08-26 RX ADMIN — HYDROXYUREA 1000 MILLIGRAM(S): 500 CAPSULE ORAL at 11:34

## 2018-08-26 RX ADMIN — ENOXAPARIN SODIUM 40 MILLIGRAM(S): 100 INJECTION SUBCUTANEOUS at 16:31

## 2018-08-26 RX ADMIN — HYDROMORPHONE HYDROCHLORIDE 30 MILLILITER(S): 2 INJECTION INTRAMUSCULAR; INTRAVENOUS; SUBCUTANEOUS at 20:00

## 2018-08-26 RX ADMIN — SODIUM CHLORIDE 100 MILLILITER(S): 9 INJECTION, SOLUTION INTRAVENOUS at 00:38

## 2018-08-26 RX ADMIN — MORPHINE SULFATE 60 MILLIGRAM(S): 50 CAPSULE, EXTENDED RELEASE ORAL at 17:31

## 2018-08-26 RX ADMIN — SODIUM CHLORIDE 100 MILLILITER(S): 9 INJECTION, SOLUTION INTRAVENOUS at 20:29

## 2018-08-26 RX ADMIN — Medication 1 MILLIGRAM(S): at 11:34

## 2018-08-26 RX ADMIN — MORPHINE SULFATE 60 MILLIGRAM(S): 50 CAPSULE, EXTENDED RELEASE ORAL at 05:33

## 2018-08-26 RX ADMIN — HYDROMORPHONE HYDROCHLORIDE 30 MILLILITER(S): 2 INJECTION INTRAMUSCULAR; INTRAVENOUS; SUBCUTANEOUS at 21:14

## 2018-08-26 RX ADMIN — FENTANYL CITRATE 1 PATCH: 50 INJECTION INTRAVENOUS at 11:34

## 2018-08-26 RX ADMIN — MORPHINE SULFATE 60 MILLIGRAM(S): 50 CAPSULE, EXTENDED RELEASE ORAL at 08:11

## 2018-08-26 RX ADMIN — HYDROMORPHONE HYDROCHLORIDE 30 MILLILITER(S): 2 INJECTION INTRAMUSCULAR; INTRAVENOUS; SUBCUTANEOUS at 08:09

## 2018-08-26 NOTE — PROGRESS NOTE ADULT - SUBJECTIVE AND OBJECTIVE BOX
Patient is a 42y old  Female who presents with a chief complaint of Sickle cell pain (25 Aug 2018 16:49)      SUBJECTIVE / OVERNIGHT EVENTS:  Pain more tolerable on PCA. Denies any fevers, chills or chest pain.        MEDICATIONS  (STANDING):  enoxaparin Injectable 40 milliGRAM(s) SubCutaneous every 24 hours  fentaNYL   Patch  50 MICROgram(s)/Hr 1 Patch Transdermal every 72 hours  folic acid 1 milliGRAM(s) Oral daily  HYDROmorphone PCA (1 mG/mL) 30 milliLiter(s) PCA Continuous PCA Continuous  hydroxyurea (Non - oncologic) 1000 milliGRAM(s) Oral daily  morphine ER Tablet 60 milliGRAM(s) Oral every 12 hours  sodium chloride 0.45%. 1000 milliLiter(s) (100 mL/Hr) IV Continuous <Continuous>    MEDICATIONS  (PRN):  diphenhydrAMINE   Capsule 25 milliGRAM(s) Oral every 4 hours PRN Pruritus  naloxone Injectable 0.1 milliGRAM(s) IV Push every 3 minutes PRN For ANY of the following changes in patient status:  A. RR LESS THAN 10 breaths per minute, B. Oxygen saturation LESS THAN 90%, C. Sedation score of 6  ondansetron Injectable 4 milliGRAM(s) IV Push every 6 hours PRN Nausea      PHYSICAL EXAM:  T(C): 36.9 (08-26-18 @ 05:29), Max: 37 (08-25-18 @ 19:36)  HR: 71 (08-26-18 @ 05:29) (62 - 71)  BP: 103/56 (08-26-18 @ 05:29) (100/58 - 114/55)  RR: 16 (08-26-18 @ 05:29) (16 - 17)  SpO2: 100% (08-26-18 @ 05:29) (100% - 100%)  I&O's Summary    GENERAL: Laying comfortably in bed  HEAD:  Atraumatic, Normocephalic, moist oral mucosa  EYES: EOMI, PERRLA, conjunctiva and sclera clear  NECK: Supple, No elevated JVD  CHEST/LUNG: Clear to auscultation bilaterally; No wheeze  HEART: Regular rate and rhythm; No murmurs, rubs, or gallops  ABDOMEN: Soft, Nontender, Nondistended; Bowel sounds present  EXTREMITIES:  2+ Peripheral Pulses, No clubbing, cyanosis, or edema; no knee effusions  PSYCH: AAOx3  NEUROLOGY: CN II-XII grossly intact, moving all extremities  SKIN: No rashes or lesions    LABS:  CAPILLARY BLOOD GLUCOSE                              7.1    6.86  )-----------( 223      ( 24 Aug 2018 22:30 )             21.7     08-24    139  |  102  |  9   ----------------------------<  93  4.2   |  21<L>  |  0.99    Ca    8.9      24 Aug 2018 22:30    TPro  7.7  /  Alb  4.3  /  TBili  2.6<H>  /  DBili  x   /  AST  35<H>  /  ALT  9   /  AlkPhos  58  08-24              RADIOLOGY & ADDITIONAL TESTS:    Imaging Personally Reviewed:    Consultant(s) Notes Reviewed:      Care Discussed with Consultants/Other Providers: Patient is a 42y old  Female who presents with a chief complaint of Sickle cell pain (25 Aug 2018 16:49)      SUBJECTIVE / OVERNIGHT EVENTS:  Pain more tolerable on PCA. Denies any fevers, chills or chest pain. Interested in being discharge upon improvement of pain.         MEDICATIONS  (STANDING):  enoxaparin Injectable 40 milliGRAM(s) SubCutaneous every 24 hours  fentaNYL   Patch  50 MICROgram(s)/Hr 1 Patch Transdermal every 72 hours  folic acid 1 milliGRAM(s) Oral daily  HYDROmorphone PCA (1 mG/mL) 30 milliLiter(s) PCA Continuous PCA Continuous  hydroxyurea (Non - oncologic) 1000 milliGRAM(s) Oral daily  morphine ER Tablet 60 milliGRAM(s) Oral every 12 hours  sodium chloride 0.45%. 1000 milliLiter(s) (100 mL/Hr) IV Continuous <Continuous>    MEDICATIONS  (PRN):  diphenhydrAMINE   Capsule 25 milliGRAM(s) Oral every 4 hours PRN Pruritus  naloxone Injectable 0.1 milliGRAM(s) IV Push every 3 minutes PRN For ANY of the following changes in patient status:  A. RR LESS THAN 10 breaths per minute, B. Oxygen saturation LESS THAN 90%, C. Sedation score of 6  ondansetron Injectable 4 milliGRAM(s) IV Push every 6 hours PRN Nausea      PHYSICAL EXAM:  T(C): 36.9 (08-26-18 @ 05:29), Max: 37 (08-25-18 @ 19:36)  HR: 71 (08-26-18 @ 05:29) (62 - 71)  BP: 103/56 (08-26-18 @ 05:29) (100/58 - 114/55)  RR: 16 (08-26-18 @ 05:29) (16 - 17)  SpO2: 100% (08-26-18 @ 05:29) (100% - 100%)  I&O's Summary    GENERAL: Laying comfortably in bed  HEAD:  Atraumatic, Normocephalic, moist oral mucosa  EYES: EOMI, PERRLA, conjunctiva and sclera clear  NECK: Supple, No elevated JVD  CHEST/LUNG: Clear to auscultation bilaterally; No wheeze  HEART: Regular rate and rhythm; No murmurs, rubs, or gallops  ABDOMEN: Soft, Nontender, Nondistended; Bowel sounds present  EXTREMITIES:  2+ Peripheral Pulses, No clubbing, cyanosis, or edema; no knee effusions  PSYCH: AAOx3  NEUROLOGY: CN II-XII grossly intact, moving all extremities  SKIN: No rashes or lesions    LABS:  CAPILLARY BLOOD GLUCOSE                              7.1    6.86  )-----------( 223      ( 24 Aug 2018 22:30 )             21.7     08-24    139  |  102  |  9   ----------------------------<  93  4.2   |  21<L>  |  0.99    Ca    8.9      24 Aug 2018 22:30    TPro  7.7  /  Alb  4.3  /  TBili  2.6<H>  /  DBili  x   /  AST  35<H>  /  ALT  9   /  AlkPhos  58  08-24              RADIOLOGY & ADDITIONAL TESTS:    Imaging Personally Reviewed:    Consultant(s) Notes Reviewed:      Care Discussed with Consultants/Other Providers:

## 2018-08-26 NOTE — PROGRESS NOTE ADULT - PROBLEM SELECTOR PLAN 1
Patient presents with 2 days of bilateral knee pain typical of her usual crises. Unclear precipitant. Hgb 8.1-->7.1, likely dilutional 2/2 IV fluids. Elevated Tbili suggestive of active hemolysis. Appropriate reticulocytosis noted; low suspicion for aplastic crisis. CXR with clear lungs; inconsistent with acute chest syndrome.    -Dilaudid PCA started yesterday  -resume home dose morphine ER 60mg q12h (verified on iSTOP)  -resume home dose Fentanyl 50mcg q72h (verified on iSTOP)  -c/w home dose folate, hydroxyurea  -IV 0.45% NaCl at 100cc/hr

## 2018-08-27 ENCOUNTER — TRANSCRIPTION ENCOUNTER (OUTPATIENT)
Age: 43
End: 2018-08-27

## 2018-08-27 VITALS
HEART RATE: 82 BPM | RESPIRATION RATE: 18 BRPM | SYSTOLIC BLOOD PRESSURE: 106 MMHG | DIASTOLIC BLOOD PRESSURE: 59 MMHG | TEMPERATURE: 98 F | OXYGEN SATURATION: 100 %

## 2018-08-27 LAB
BUN SERPL-MCNC: 7 MG/DL — SIGNIFICANT CHANGE UP (ref 7–23)
CALCIUM SERPL-MCNC: 8.5 MG/DL — SIGNIFICANT CHANGE UP (ref 8.4–10.5)
CHLORIDE SERPL-SCNC: 106 MMOL/L — SIGNIFICANT CHANGE UP (ref 98–107)
CO2 SERPL-SCNC: 22 MMOL/L — SIGNIFICANT CHANGE UP (ref 22–31)
CREAT SERPL-MCNC: 0.84 MG/DL — SIGNIFICANT CHANGE UP (ref 0.5–1.3)
GLUCOSE SERPL-MCNC: 89 MG/DL — SIGNIFICANT CHANGE UP (ref 70–99)
HCT VFR BLD CALC: 19.4 % — CRITICAL LOW (ref 34.5–45)
HGB BLD-MCNC: 6.6 G/DL — CRITICAL LOW (ref 11.5–15.5)
MCHC RBC-ENTMCNC: 25.9 PG — LOW (ref 27–34)
MCHC RBC-ENTMCNC: 34 % — SIGNIFICANT CHANGE UP (ref 32–36)
MCV RBC AUTO: 76.1 FL — LOW (ref 80–100)
NRBC # FLD: 0.12 — SIGNIFICANT CHANGE UP
NRBC FLD-RTO: 1.7 — SIGNIFICANT CHANGE UP
PLATELET # BLD AUTO: 189 K/UL — SIGNIFICANT CHANGE UP (ref 150–400)
PMV BLD: 9.7 FL — SIGNIFICANT CHANGE UP (ref 7–13)
POTASSIUM SERPL-MCNC: 3.8 MMOL/L — SIGNIFICANT CHANGE UP (ref 3.5–5.3)
POTASSIUM SERPL-SCNC: 3.8 MMOL/L — SIGNIFICANT CHANGE UP (ref 3.5–5.3)
RBC # BLD: 2.55 M/UL — LOW (ref 3.8–5.2)
RBC # FLD: 14.6 % — HIGH (ref 10.3–14.5)
SODIUM SERPL-SCNC: 139 MMOL/L — SIGNIFICANT CHANGE UP (ref 135–145)
WBC # BLD: 7 K/UL — SIGNIFICANT CHANGE UP (ref 3.8–10.5)
WBC # FLD AUTO: 7 K/UL — SIGNIFICANT CHANGE UP (ref 3.8–10.5)

## 2018-08-27 PROCEDURE — 99239 HOSP IP/OBS DSCHRG MGMT >30: CPT

## 2018-08-27 RX ADMIN — MORPHINE SULFATE 60 MILLIGRAM(S): 50 CAPSULE, EXTENDED RELEASE ORAL at 07:26

## 2018-08-27 RX ADMIN — HYDROMORPHONE HYDROCHLORIDE 30 MILLILITER(S): 2 INJECTION INTRAMUSCULAR; INTRAVENOUS; SUBCUTANEOUS at 07:23

## 2018-08-27 RX ADMIN — MORPHINE SULFATE 60 MILLIGRAM(S): 50 CAPSULE, EXTENDED RELEASE ORAL at 06:27

## 2018-08-27 NOTE — DISCHARGE NOTE ADULT - HOSPITAL COURSE
This is a 42yof PMH sickle cell dz presents with 2-day c/o knee pain. Patient presented afebrile, normotensive, normal HR. O2 sat 100% RA. CXR shows clear lungs. Xray knees without fractures or gross e/o AVN.    Hospital Course  Hb-SS disease with crisis:  -Patient presents with 2 days of bilateral knee pain typical of her usual crises. Unclear precipitant. Hgb 8.1-->7.1, likely dilutional 2/2 IV fluids. Elevated Tbili suggestive of active hemolysis. Appropriate reticulocytosis noted; low suspicion for aplastic crisis. CXR with clear lungs; inconsistent with acute chest syndrome.  Dilaudid PCA initiated  Received IV 0.45% NaCl at 100cc/hr.   Home dose of morphine ER 60mg q12h and  Fentanyl 50mcg q72h resumed (verified on iSTOP)  On 8/27/18 patient requested discharge from hospital. PCA stopped and patient tolerating home pain meds.  ISTOP Reference #  Patient is medically stable for d/c home This is a 42yof PMH sickle cell dz presents with 2-day c/o knee pain. Patient presented afebrile, normotensive, normal HR. O2 sat 100% RA. CXR shows clear lungs. Xray knees without fractures or gross e/o AVN.    Hospital Course  Hb-SS disease with crisis:  -Patient presents with 2 days of bilateral knee pain typical of her usual crises. Unclear precipitant. Hgb 8.1-->7.1, likely dilutional 2/2 IV fluids. Elevated Tbili suggestive of active hemolysis. Appropriate reticulocytosis noted; low suspicion for aplastic crisis. CXR with clear lungs; inconsistent with acute chest syndrome.  Dilaudid PCA initiated  Received IV 0.45% NaCl at 100cc/hr.   Home dose of morphine ER 60mg q12h and  Fentanyl 50mcg q72h resumed (verified on iSTOP)  On 8/27/18 patient requested discharge from hospital. PCA stopped and patient tolerating home pain meds.  ISTOP Reference ##: [id number]   Patient is medically stable for d/c home

## 2018-08-27 NOTE — DISCHARGE NOTE ADULT - CARE PLAN
Principal Discharge DX:	Hb-SS disease with crisis  Goal:	Pain management  Assessment and plan of treatment:	Continue home pain medication as needed.  Follow up with outpatient hematologist.  Continue home dose folate, hydroxyurea Principal Discharge DX:	Hb-SS disease with crisis  Goal:	Pain management  Assessment and plan of treatment:	Continue home pain medication as needed.  Follow up with outpatient hematologist Dr. Mccollum.  Continue home dose folate, hydroxyurea

## 2018-08-27 NOTE — DISCHARGE NOTE ADULT - PATIENT PORTAL LINK FT
You can access the [facility] Patient Portal, offered by [facility], by registering with the following website: [email or web address]

## 2018-08-27 NOTE — DISCHARGE NOTE ADULT - PLAN OF CARE
Pain management Continue home pain medication as needed.  Follow up with outpatient hematologist.  Continue home dose folate, hydroxyurea Continue home pain medication as needed.  Follow up with outpatient hematologist Dr. Mccollum.  Continue home dose folate, hydroxyurea

## 2018-08-27 NOTE — PROGRESS NOTE ADULT - PROBLEM SELECTOR PLAN 1
Patient presents with 2 days of bilateral knee pain typical of her usual crises. Unclear precipitant. Hgb 8.1-->7.1, likely dilutional 2/2 IV fluids. Elevated Tbili suggestive of active hemolysis. Appropriate reticulocytosis noted; low suspicion for aplastic crisis. CXR with clear lungs; inconsistent with acute chest syndrome.  DC dilaudid PCA  -resume home dose morphine ER 60mg q12h (verified on iSTOP)  -resume home dose Fentanyl 50mcg q72h (verified on iSTOP)  -c/w home dose folate, hydroxyurea  -patient to F/U closely with Dr. Salazar upon DC

## 2018-08-27 NOTE — DISCHARGE NOTE ADULT - MEDICATION SUMMARY - MEDICATIONS TO TAKE
I will START or STAY ON the medications listed below when I get home from the hospital:    fentaNYL 50 mcg/hr transdermal film, extended release  -- 1 film(s) by transdermal patch every 72 hours  -- Indication: For Hb-SS disease with crisis    morphine 60 mg/12 hours oral tablet, extended release  -- 1 tab(s) by mouth every 12 hours  -- Indication: For Hb-SS disease with crisis    Morphine IR 30 mg oral tablet  -- 1 tab(s) by mouth every 6 hours, As Needed  -- Indication: For Hb-SS disease with crisis    hydroxyurea 500 mg oral capsule  -- 2 cap(s) by mouth once a day  -- Indication: For Hb-SS disease with crisis    folic acid 1 mg oral tablet  -- 1 tab(s) by mouth once a day  -- Indication: For vitamin

## 2018-08-27 NOTE — PROGRESS NOTE ADULT - SUBJECTIVE AND OBJECTIVE BOX
Patient is a 42y old  Female who presents with a chief complaint of Sickle cell pain (27 Aug 2018 11:51)    SUBJECTIVE / OVERNIGHT EVENTS:  Patient denying any complaints, feels well.     MEDICATIONS  (STANDING):  enoxaparin Injectable 40 milliGRAM(s) SubCutaneous every 24 hours  fentaNYL   Patch  50 MICROgram(s)/Hr 1 Patch Transdermal every 72 hours  folic acid 1 milliGRAM(s) Oral daily  hydroxyurea (Non - oncologic) 1000 milliGRAM(s) Oral daily  morphine ER Tablet 60 milliGRAM(s) Oral every 12 hours  sodium chloride 0.45%. 1000 milliLiter(s) (100 mL/Hr) IV Continuous <Continuous>    MEDICATIONS  (PRN):  diphenhydrAMINE   Capsule 25 milliGRAM(s) Oral every 4 hours PRN Pruritus  naloxone Injectable 0.1 milliGRAM(s) IV Push every 3 minutes PRN For ANY of the following changes in patient status:  A. RR LESS THAN 10 breaths per minute, B. Oxygen saturation LESS THAN 90%, C. Sedation score of 6  ondansetron Injectable 4 milliGRAM(s) IV Push every 6 hours PRN Nausea      Vital Signs Last 24 Hrs  T(C): 36.8 (27 Aug 2018 08:34), Max: 36.9 (26 Aug 2018 15:13)  T(F): 98.3 (27 Aug 2018 08:34), Max: 98.4 (26 Aug 2018 15:13)  HR: 82 (27 Aug 2018 08:34) (63 - 85)  BP: 106/59 (27 Aug 2018 08:34) (104/64 - 108/55)  BP(mean): --  RR: 18 (27 Aug 2018 08:34) (16 - 18)  SpO2: 100% (27 Aug 2018 08:34) (99% - 100%)  CAPILLARY BLOOD GLUCOSE      I&O's Summary    PHYSICAL EXAM  GENERAL: NAD, well-developed  HEAD:  Atraumatic, Normocephalic  EYES: EOMI, PERRLA, conjunctiva and sclera clear  NECK: Supple, No JVD  CHEST/LUNG: Clear to auscultation bilaterally; No wheeze  HEART: Regular rate and rhythm; No murmurs, rubs, or gallops  ABDOMEN: Soft, Nontender, Nondistended; Bowel sounds present  EXTREMITIES:  2+ Peripheral Pulses, No clubbing, cyanosis, or edema  PSYCH: AAOx3  SKIN: No rashes or lesions    LABS:                        6.6    7.00  )-----------( 189      ( 27 Aug 2018 07:15 )             19.4     08-27    139  |  106  |  7   ----------------------------<  89  3.8   |  22  |  0.84    Ca    8.5      27 Aug 2018 07:15                  RADIOLOGY & ADDITIONAL TESTS:    Imaging Personally Reviewed:  Consultant(s) Notes Reviewed:    Care Discussed with Consultants/Other Providers:

## 2018-08-27 NOTE — PROGRESS NOTE ADULT - ASSESSMENT
42 W PMH sickle cell dz presents with 2-day c/o knee pain. Afebrile, normotensive, normal HR. O2 sat 100% RA. On exam, she is alert, oriented, lungs are clear, she is in mild distress due to pain. Hgb 7.1, abs retic 162, sCr 0.99, TBili 2.6. CXR shows clear lungs. Xray knees without fractures or gross e/o AVN

## 2018-10-05 ENCOUNTER — APPOINTMENT (OUTPATIENT)
Dept: INTERNAL MEDICINE | Facility: HOSPITAL | Age: 43
End: 2018-10-05

## 2018-11-09 ENCOUNTER — INPATIENT (INPATIENT)
Facility: HOSPITAL | Age: 43
LOS: 2 days | Discharge: HOME | End: 2018-11-12
Attending: HOSPITALIST | Admitting: HOSPITALIST
Payer: MEDICAID

## 2018-11-09 VITALS
RESPIRATION RATE: 16 BRPM | HEART RATE: 83 BPM | SYSTOLIC BLOOD PRESSURE: 94 MMHG | OXYGEN SATURATION: 100 % | TEMPERATURE: 99 F | DIASTOLIC BLOOD PRESSURE: 59 MMHG

## 2018-11-09 DIAGNOSIS — Z98.891 HISTORY OF UTERINE SCAR FROM PREVIOUS SURGERY: Chronic | ICD-10-CM

## 2018-11-09 DIAGNOSIS — D57.01 HB-SS DISEASE WITH ACUTE CHEST SYNDROME: ICD-10-CM

## 2018-11-09 DIAGNOSIS — Z98.890 OTHER SPECIFIED POSTPROCEDURAL STATES: Chronic | ICD-10-CM

## 2018-11-09 DIAGNOSIS — R93.89 ABNORMAL FINDINGS ON DIAGNOSTIC IMAGING OF OTHER SPECIFIED BODY STRUCTURES: ICD-10-CM

## 2018-11-09 DIAGNOSIS — E86.0 DEHYDRATION: ICD-10-CM

## 2018-11-09 LAB
ALBUMIN SERPL ELPH-MCNC: 4.7 G/DL — SIGNIFICANT CHANGE UP (ref 3.3–5)
ALP SERPL-CCNC: 56 U/L — SIGNIFICANT CHANGE UP (ref 40–120)
ALT FLD-CCNC: 7 U/L — SIGNIFICANT CHANGE UP (ref 4–33)
APTT BLD: 33.9 SEC — SIGNIFICANT CHANGE UP (ref 27.5–36.3)
AST SERPL-CCNC: 43 U/L — HIGH (ref 4–32)
BASOPHILS # BLD AUTO: 0.08 K/UL — SIGNIFICANT CHANGE UP (ref 0–0.2)
BASOPHILS NFR BLD AUTO: 1.3 % — SIGNIFICANT CHANGE UP (ref 0–2)
BILIRUB SERPL-MCNC: 2.7 MG/DL — HIGH (ref 0.2–1.2)
BLD GP AB SCN SERPL QL: POSITIVE — SIGNIFICANT CHANGE UP
BUN SERPL-MCNC: 9 MG/DL — SIGNIFICANT CHANGE UP (ref 7–23)
CALCIUM SERPL-MCNC: 9.2 MG/DL — SIGNIFICANT CHANGE UP (ref 8.4–10.5)
CHLORIDE SERPL-SCNC: 103 MMOL/L — SIGNIFICANT CHANGE UP (ref 98–107)
CO2 SERPL-SCNC: 26 MMOL/L — SIGNIFICANT CHANGE UP (ref 22–31)
CREAT SERPL-MCNC: 0.92 MG/DL — SIGNIFICANT CHANGE UP (ref 0.5–1.3)
EOSINOPHIL # BLD AUTO: 0.74 K/UL — HIGH (ref 0–0.5)
EOSINOPHIL NFR BLD AUTO: 12 % — HIGH (ref 0–6)
GLUCOSE SERPL-MCNC: 87 MG/DL — SIGNIFICANT CHANGE UP (ref 70–99)
HCT VFR BLD CALC: 22.4 % — LOW (ref 34.5–45)
HGB BLD-MCNC: 7.6 G/DL — LOW (ref 11.5–15.5)
IMM GRANULOCYTES # BLD AUTO: 0.02 # — SIGNIFICANT CHANGE UP
IMM GRANULOCYTES NFR BLD AUTO: 0.3 % — SIGNIFICANT CHANGE UP (ref 0–1.5)
INR BLD: 1.08 — SIGNIFICANT CHANGE UP (ref 0.88–1.17)
LYMPHOCYTES # BLD AUTO: 3.21 K/UL — SIGNIFICANT CHANGE UP (ref 1–3.3)
LYMPHOCYTES # BLD AUTO: 51.9 % — HIGH (ref 13–44)
MCHC RBC-ENTMCNC: 26 PG — LOW (ref 27–34)
MCHC RBC-ENTMCNC: 33.9 % — SIGNIFICANT CHANGE UP (ref 32–36)
MCV RBC AUTO: 76.7 FL — LOW (ref 80–100)
MONOCYTES # BLD AUTO: 0.55 K/UL — SIGNIFICANT CHANGE UP (ref 0–0.9)
MONOCYTES NFR BLD AUTO: 8.9 % — SIGNIFICANT CHANGE UP (ref 2–14)
NEUTROPHILS # BLD AUTO: 1.59 K/UL — LOW (ref 1.8–7.4)
NEUTROPHILS NFR BLD AUTO: 25.6 % — LOW (ref 43–77)
NRBC # FLD: 0.04 — SIGNIFICANT CHANGE UP
PLATELET # BLD AUTO: 268 K/UL — SIGNIFICANT CHANGE UP (ref 150–400)
PMV BLD: 9.3 FL — SIGNIFICANT CHANGE UP (ref 7–13)
POTASSIUM SERPL-MCNC: 4.4 MMOL/L — SIGNIFICANT CHANGE UP (ref 3.5–5.3)
POTASSIUM SERPL-SCNC: 4.4 MMOL/L — SIGNIFICANT CHANGE UP (ref 3.5–5.3)
PROT SERPL-MCNC: 8.6 G/DL — HIGH (ref 6–8.3)
PROTHROM AB SERPL-ACNC: 12.3 SEC — SIGNIFICANT CHANGE UP (ref 9.8–13.1)
RBC # BLD: 2.92 M/UL — LOW (ref 3.8–5.2)
RBC # FLD: 13.6 % — SIGNIFICANT CHANGE UP (ref 10.3–14.5)
RETICS #: 139 K/UL — HIGH (ref 25–125)
RETICS/RBC NFR: 4.8 % — HIGH (ref 0.5–2.5)
RH IG SCN BLD-IMP: POSITIVE — SIGNIFICANT CHANGE UP
SODIUM SERPL-SCNC: 140 MMOL/L — SIGNIFICANT CHANGE UP (ref 135–145)
WBC # BLD: 6.19 K/UL — SIGNIFICANT CHANGE UP (ref 3.8–10.5)
WBC # FLD AUTO: 6.19 K/UL — SIGNIFICANT CHANGE UP (ref 3.8–10.5)

## 2018-11-09 PROCEDURE — 71046 X-RAY EXAM CHEST 2 VIEWS: CPT | Mod: 26

## 2018-11-09 PROCEDURE — 99223 1ST HOSP IP/OBS HIGH 75: CPT

## 2018-11-09 PROCEDURE — 86077 PHYS BLOOD BANK SERV XMATCH: CPT

## 2018-11-09 RX ORDER — SODIUM CHLORIDE 9 MG/ML
1000 INJECTION, SOLUTION INTRAVENOUS ONCE
Qty: 0 | Refills: 0 | Status: COMPLETED | OUTPATIENT
Start: 2018-11-09 | End: 2018-11-09

## 2018-11-09 RX ORDER — SODIUM CHLORIDE 9 MG/ML
500 INJECTION INTRAMUSCULAR; INTRAVENOUS; SUBCUTANEOUS ONCE
Qty: 0 | Refills: 0 | Status: COMPLETED | OUTPATIENT
Start: 2018-11-09 | End: 2018-11-09

## 2018-11-09 RX ORDER — NALOXONE HYDROCHLORIDE 4 MG/.1ML
0.1 SPRAY NASAL
Qty: 0 | Refills: 0 | Status: DISCONTINUED | OUTPATIENT
Start: 2018-11-09 | End: 2018-11-09

## 2018-11-09 RX ORDER — ONDANSETRON 8 MG/1
4 TABLET, FILM COATED ORAL EVERY 6 HOURS
Qty: 0 | Refills: 0 | Status: DISCONTINUED | OUTPATIENT
Start: 2018-11-09 | End: 2018-11-11

## 2018-11-09 RX ORDER — NALOXONE HYDROCHLORIDE 4 MG/.1ML
0.1 SPRAY NASAL
Qty: 0 | Refills: 0 | Status: DISCONTINUED | OUTPATIENT
Start: 2018-11-09 | End: 2018-11-11

## 2018-11-09 RX ORDER — MORPHINE SULFATE 50 MG/1
30 CAPSULE, EXTENDED RELEASE ORAL
Qty: 0 | Refills: 0 | Status: DISCONTINUED | OUTPATIENT
Start: 2018-11-09 | End: 2018-11-11

## 2018-11-09 RX ORDER — ACETAMINOPHEN 500 MG
1000 TABLET ORAL ONCE
Qty: 0 | Refills: 0 | Status: COMPLETED | OUTPATIENT
Start: 2018-11-09 | End: 2018-11-09

## 2018-11-09 RX ORDER — SODIUM CHLORIDE 9 MG/ML
1000 INJECTION, SOLUTION INTRAVENOUS
Qty: 0 | Refills: 0 | Status: DISCONTINUED | OUTPATIENT
Start: 2018-11-09 | End: 2018-11-12

## 2018-11-09 RX ORDER — MORPHINE SULFATE 50 MG/1
6 CAPSULE, EXTENDED RELEASE ORAL ONCE
Qty: 0 | Refills: 0 | Status: DISCONTINUED | OUTPATIENT
Start: 2018-11-09 | End: 2018-11-09

## 2018-11-09 RX ORDER — HYDROMORPHONE HYDROCHLORIDE 2 MG/ML
30 INJECTION INTRAMUSCULAR; INTRAVENOUS; SUBCUTANEOUS
Qty: 0 | Refills: 0 | Status: DISCONTINUED | OUTPATIENT
Start: 2018-11-09 | End: 2018-11-09

## 2018-11-09 RX ORDER — ONDANSETRON 8 MG/1
4 TABLET, FILM COATED ORAL EVERY 6 HOURS
Qty: 0 | Refills: 0 | Status: DISCONTINUED | OUTPATIENT
Start: 2018-11-09 | End: 2018-11-09

## 2018-11-09 RX ORDER — DIPHENHYDRAMINE HCL 50 MG
25 CAPSULE ORAL EVERY 4 HOURS
Qty: 0 | Refills: 0 | Status: DISCONTINUED | OUTPATIENT
Start: 2018-11-09 | End: 2018-11-12

## 2018-11-09 RX ORDER — DIPHENHYDRAMINE HCL 50 MG
25 CAPSULE ORAL ONCE
Qty: 0 | Refills: 0 | Status: COMPLETED | OUTPATIENT
Start: 2018-11-09 | End: 2018-11-09

## 2018-11-09 RX ORDER — ENOXAPARIN SODIUM 100 MG/ML
40 INJECTION SUBCUTANEOUS EVERY 24 HOURS
Qty: 0 | Refills: 0 | Status: DISCONTINUED | OUTPATIENT
Start: 2018-11-09 | End: 2018-11-12

## 2018-11-09 RX ORDER — HYDROMORPHONE HYDROCHLORIDE 2 MG/ML
1 INJECTION INTRAMUSCULAR; INTRAVENOUS; SUBCUTANEOUS
Qty: 0 | Refills: 0 | Status: DISCONTINUED | OUTPATIENT
Start: 2018-11-09 | End: 2018-11-10

## 2018-11-09 RX ADMIN — MORPHINE SULFATE 6 MILLIGRAM(S): 50 CAPSULE, EXTENDED RELEASE ORAL at 18:30

## 2018-11-09 RX ADMIN — SODIUM CHLORIDE 500 MILLILITER(S): 9 INJECTION INTRAMUSCULAR; INTRAVENOUS; SUBCUTANEOUS at 19:02

## 2018-11-09 RX ADMIN — SODIUM CHLORIDE 120 MILLILITER(S): 9 INJECTION, SOLUTION INTRAVENOUS at 21:49

## 2018-11-09 RX ADMIN — HYDROMORPHONE HYDROCHLORIDE 1 MILLIGRAM(S): 2 INJECTION INTRAMUSCULAR; INTRAVENOUS; SUBCUTANEOUS at 21:49

## 2018-11-09 RX ADMIN — Medication 25 MILLIGRAM(S): at 18:31

## 2018-11-09 RX ADMIN — SODIUM CHLORIDE 1000 MILLILITER(S): 9 INJECTION, SOLUTION INTRAVENOUS at 23:02

## 2018-11-09 RX ADMIN — Medication 1000 MILLIGRAM(S): at 20:12

## 2018-11-09 RX ADMIN — SODIUM CHLORIDE 500 MILLILITER(S): 9 INJECTION INTRAMUSCULAR; INTRAVENOUS; SUBCUTANEOUS at 18:31

## 2018-11-09 RX ADMIN — MORPHINE SULFATE 6 MILLIGRAM(S): 50 CAPSULE, EXTENDED RELEASE ORAL at 20:16

## 2018-11-09 RX ADMIN — MORPHINE SULFATE 6 MILLIGRAM(S): 50 CAPSULE, EXTENDED RELEASE ORAL at 20:12

## 2018-11-09 RX ADMIN — Medication 400 MILLIGRAM(S): at 18:30

## 2018-11-09 RX ADMIN — Medication 1000 MILLIGRAM(S): at 19:02

## 2018-11-09 RX ADMIN — MORPHINE SULFATE 6 MILLIGRAM(S): 50 CAPSULE, EXTENDED RELEASE ORAL at 21:37

## 2018-11-09 NOTE — ED ADULT NURSE NOTE - OBJECTIVE STATEMENT
Break coverage RN- pt c/o sickle cell crisis, c/o chest pain, bilateral knee pain and back pain, pt PMH of blood transfusions, and acute chest in 2015- pt denies any SOB, n/v/d, dizziness, weakness, visual changes, headache. pt awake, a/ox3, vitally stable in NAD, IV 20g placed by EDMD with guided ultrasound to right inner arm- blood work sent. MD at bedside, awaiting further orders from MD, will continue to monitor, pt safety maintained.

## 2018-11-09 NOTE — ED PROVIDER NOTE - OBJECTIVE STATEMENT
44yo female pmh sickle cell c/b acute chest in past p/w chest pain, back pain, bilateral knee pain x 1 week after suffering from cold symptoms that have resolved. Went to hematologist who gave IV NS, no improvement with IV NS at hematologist's office or with PO morphine 30mg q4h. Denies fever, chills, dyspnea, palpitations, dizziness, recent cough, nausea, vomiting, diarrhea, abdominal pain, dysuria, leg swelling, recent long travel.    Significant past medical hx/surgical hx/social hx and review of systems can be found above in the history of present illness.

## 2018-11-09 NOTE — H&P ADULT - FAMILY HISTORY
No pertinent family history in first degree relatives Father  Still living? Unknown  Family history of sickle cell trait, Age at diagnosis: Age Unknown     Mother  Still living? Unknown  Family history of sickle cell trait, Age at diagnosis: Age Unknown     Child  Still living? Unknown  Family history of sickle cell trait, Age at diagnosis: Age Unknown     Child  Still living? Unknown  Family history of sickle cell trait, Age at diagnosis: Age Unknown

## 2018-11-09 NOTE — H&P ADULT - ASSESSMENT
43 year old female, with past history significant for Hb-SS disease with crisis and Smoking, presented to the ED secondary to chest pain, back pain and B/L knee pain for one week.  Vital signs upon ED presentation as follows: BP = 94/59  (to 106/56), HR = 83, RR = 16, T = 37 C (98.6 F), O2 Sat = 100% on RA.  CXR significant for "ill defined right lower lobe opacity."  Diagnosed with Acute Chest Syndrome.  Admitted for further management of: 43 year old female, with past history significant for Hb-SS disease with crisis, Acute chest syndrome and Smoking, presented to the ED secondary to chest pain, back pain and B/L knee pain for one week.  Vital signs upon ED presentation as follows: BP = 94/59  (to 106/56), HR = 83, RR = 16, T = 37 C (98.6 F), O2 Sat = 100% on RA.  CXR significant for "ill defined right lower lobe opacity."  Diagnosed with Acute Chest Syndrome.  Admitted for further management of:

## 2018-11-09 NOTE — ED ADULT NURSE REASSESSMENT NOTE - NS ED NURSE REASSESS COMMENT FT1
Report received from Nidhi PEOPLES. Pt resting at this time however still reports back and leg pain that is unrelieved. Pt denies chest pain, SOB, difficulty breathing at this time. respirations are even and unlabored.  Pt awaiting bed upstairs.

## 2018-11-09 NOTE — ED PROVIDER NOTE - PROGRESS NOTE DETAILS
Edgard Pham, PGY3: Requiring more pain meds. Opacity on Edgard Pham, PGY3: Requiring more pain meds. Opacity on CXR, started levaquin. Admit to hospitalist

## 2018-11-09 NOTE — ED ADULT TRIAGE NOTE - CHIEF COMPLAINT QUOTE
c/o pain crisis x 1 week. has chest pain, back pain, and b/l leg pain. hx sickle cell, acute chest syndrome

## 2018-11-09 NOTE — H&P ADULT - NSHPLABSRESULTS_GEN_ALL_CORE
7.6    6.19  )-----------( 268      ( 09 Nov 2018 18:00 )             22.4       11-09    140  |  103  |  9   ----------------------------<  87  4.4   |  26  |  0.92    Ca    9.2      09 Nov 2018 18:00    TPro  8.6<H>  /  Alb  4.7  /  TBili  2.7<H>  /  DBili  x   /  AST  43<H>  /  ALT  7   /  AlkPhos  56  11-09          PT/INR - ( 09 Nov 2018 18:00 )   PT: 12.3 SEC;   INR: 1.08          PTT - ( 09 Nov 2018 18:00 )  PTT:33.9 SEC    Lactate Trend      CAPILLARY BLOOD GLUCOSE

## 2018-11-09 NOTE — H&P ADULT - PROBLEM SELECTOR PLAN 3
- very dry oral mucosa and skin  - prescribed  mL NS in the ED.  Additional 1 liter LR bolus prescribed, and continued on 0.45 NS at 120 mL/Hr  - encourage oral hydration  - f/u for improvement

## 2018-11-09 NOTE — H&P ADULT - HISTORY OF PRESENT ILLNESS
43 year old female, with past history significant for Hb-SS disease with crisis and Smoking, presented to the ED secondary to chest pain, back pain and B/L knee pain for one week.  Seen and evaluated at bedside;    Vital signs upon ED presentation as follows: BP = 94/59  (to 106/56), HR = 83, RR = 16, T = 37 C (98.6 F), O2 Sat = 100% on RA.  CXR significant for "ill defined right lower lobe opacity."  Diagnosed with Acute Chest Syndrome.  Prescribed levofloxacin 750 mg IV, NaCl 500 mL bolus, followed by 0.45 NS at 120 mL/Hr, Tylenol 560 mg IV x one, Benadryl 25 mg IV x one and morphine 6 mg IV x 2 in the ED. 43 year old female, with past history significant for Hb-SS disease with crisis, Acute chest syndrome and Smoking, presented to the ED secondary to chest pain, back pain and B/L knee pain for one week.  Seen and evaluated at bedside;    Vital signs upon ED presentation as follows: BP = 94/59  (to 106/56), HR = 83, RR = 16, T = 37 C (98.6 F), O2 Sat = 100% on RA.  CXR significant for "ill defined right lower lobe opacity."  Diagnosed with Acute Chest Syndrome.  Prescribed levofloxacin 750 mg IV, NaCl 500 mL bolus, followed by 0.45 NS at 120 mL/Hr, Tylenol 560 mg IV x one, Benadryl 25 mg IV x one and morphine 6 mg IV x 2 in the ED. 43 year old female, with past history significant for Hb-SS disease with crisis, Acute chest syndrome and Smoking, presented to the ED secondary to worsening of chest pain, back pain and B/L knee pain - present for one week.  Seen and evaluated at bedside; appears to be experiencing mild painful discomfort.  Patient relates onset of chest, back and B/L leg pain approximately one week prior; eventually visited her PMD on Tuesday and received treatment (including MS-contin, Ir morphine, Duragesic 50 mcg, folic acid, IVF, but without improvement.  Pain is worse at nights.  Chest pain is said to be more of tightness.  Suffers also with early morning headaches, but dissipates with or without Motrin after some time.  States decreased oral intake over the past week.  Reports sick contacts as her 2 daughters who had a cold and flu-like symptoms.  Had signs/symptoms of a cold also while they were ill, but resolved after her children got better.  No reports of fevers, chills, sweating currently.  Due to poor oral intake, does not feel that she has been keeping herself adequately hydrated.    Vital signs upon ED presentation as follows: BP = 94/59  (to 106/56), HR = 83, RR = 16, T = 37 C (98.6 F), O2 Sat = 100% on RA.  CXR significant for "ill defined right lower lobe opacity."  Diagnosed with Acute Chest Syndrome.  Prescribed levofloxacin 750 mg IV, NaCl 500 mL bolus, followed by 0.45 NS at 120 mL/Hr, Tylenol 560 mg IV x one, Benadryl 25 mg IV x one and morphine 6 mg IV x 2 in the ED.

## 2018-11-09 NOTE — H&P ADULT - PROBLEM SELECTOR PLAN 2
- RLL opacity, ill defined  - in the setting of cold flu-like signs/symptoms ~ 1 week ago.  - started on levofloxacin 750 mg daily; continuing  - f/u cultures  - f/u pulse oximetry

## 2018-11-09 NOTE — ED ADULT NURSE NOTE - NSIMPLEMENTINTERV_GEN_ALL_ED
Implemented All Universal Safety Interventions:  [place] to call system. Call bell, personal items and telephone within reach. Instruct patient to call for assistance. Room bathroom lighting operational. Non-slip footwear when patient is off stretcher. Physically safe environment: no spills, clutter or unnecessary equipment. Stretcher in lowest position, wheels locked, appropriate side rails in place.

## 2018-11-09 NOTE — H&P ADULT - PROBLEM SELECTOR PLAN 1
- chest pain/tightness, with CXR demonstrating "ill defined right lower lobe opacity"  - no reports of fevers, chills, sweating  - likely due to cold & flu-like symptoms > 1 week ago  - saturating 100% on RA  - ECG = NSR at 87 bpm, QTc = 433  - morphine PCA in progress (patient prefers morphine to dilaudid)  - f/u pulse oximetry  - incentive spirometry  - f/u cultures  - f/u AM lab-work - chest pain/tightness, with CXR demonstrating "ill defined right lower lobe opacity"  - no reports of fevers, chills, sweating  - likely due to cold & flu-like symptoms > 1 week ago  - saturating 100% on RA  - ECG = NSR at 87 bpm, QTc = 433  - morphine PCA in progress (patient prefers morphine to dilaudid)  - f/u pulse oximetry  - incentive spirometry  - f/u cultures  - f/u AM lab-work  - Hematology consult in the AM  - if signs of respiratory compromise/deterioration - MICU consult (no issues presently) - chest pain/tightness, with CXR demonstrating "ill defined right lower lobe opacity"  - no reports of fevers, chills, sweating  - likely due to cold & flu-like symptoms > 1 week ago  - saturating 100% on RA  - ECG = NSR at 87 bpm, QTc = 433  - morphine PCA in progress (patient prefers morphine to dilaudid)  - f/u pulse oximetry  - incentive spirometry  - f/u cultures  - f/u AM lab-work  - evaluate for restart of Duragesic 50 mcg (last placed 11/06/2018, but removed on the floor); not continued.  Patient currently with borderline low blood pressure while on morphine PCA  - Hematology consult in the AM  - if signs of respiratory compromise/deterioration - MICU consult (no issues presently)

## 2018-11-09 NOTE — ED PROVIDER NOTE - ATTENDING CONTRIBUTION TO CARE
[place]  pt with SSD  1 week of sxs knee pain  low back pain  central CP  somewhat worse with movement  no fever   h/o chest crisis  but this pain not quite the same Has been on po narcotic for the last week w/o relief    exam  pt with clear lungs  no visible distress  card RRR S1S2 TASH  abd benign    able to range knees  able to sit up/lie down w/o pain  CXR vague haziness RLL

## 2018-11-09 NOTE — ED PROVIDER NOTE - MEDICAL DECISION MAKING DETAILS
43F pmh sickle cell p/w chest pain, back pain knee pain after recent URI symptoms that have resolved. Lungs CTA. EKG. CXR. Pain control. 500cc bolus for SBP 90s. Reassess

## 2018-11-09 NOTE — H&P ADULT - NSHPSOCIALHISTORY_GEN_ALL_CORE
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

## 2018-11-10 ENCOUNTER — TRANSCRIPTION ENCOUNTER (OUTPATIENT)
Age: 43
End: 2018-11-10

## 2018-11-10 LAB
ALBUMIN SERPL ELPH-MCNC: 3.8 G/DL — SIGNIFICANT CHANGE UP (ref 3.3–5)
ALP SERPL-CCNC: 48 U/L — SIGNIFICANT CHANGE UP (ref 40–120)
ALT FLD-CCNC: 6 U/L — SIGNIFICANT CHANGE UP (ref 4–33)
AST SERPL-CCNC: 36 U/L — HIGH (ref 4–32)
BASOPHILS # BLD AUTO: 0.05 K/UL — SIGNIFICANT CHANGE UP (ref 0–0.2)
BASOPHILS NFR BLD AUTO: 0.9 % — SIGNIFICANT CHANGE UP (ref 0–2)
BILIRUB SERPL-MCNC: 2.1 MG/DL — HIGH (ref 0.2–1.2)
BUN SERPL-MCNC: 7 MG/DL — SIGNIFICANT CHANGE UP (ref 7–23)
CALCIUM SERPL-MCNC: 8.3 MG/DL — LOW (ref 8.4–10.5)
CHLORIDE SERPL-SCNC: 105 MMOL/L — SIGNIFICANT CHANGE UP (ref 98–107)
CO2 SERPL-SCNC: 21 MMOL/L — LOW (ref 22–31)
CREAT SERPL-MCNC: 0.92 MG/DL — SIGNIFICANT CHANGE UP (ref 0.5–1.3)
EOSINOPHIL # BLD AUTO: 0.65 K/UL — HIGH (ref 0–0.5)
EOSINOPHIL NFR BLD AUTO: 11.7 % — HIGH (ref 0–6)
GLUCOSE SERPL-MCNC: 89 MG/DL — SIGNIFICANT CHANGE UP (ref 70–99)
HAPTOGLOB SERPL-MCNC: < 20 MG/DL — LOW (ref 34–200)
HCT VFR BLD CALC: 19.8 % — CRITICAL LOW (ref 34.5–45)
HGB BLD-MCNC: 6.6 G/DL — CRITICAL LOW (ref 11.5–15.5)
IMM GRANULOCYTES # BLD AUTO: 0.05 # — SIGNIFICANT CHANGE UP
IMM GRANULOCYTES NFR BLD AUTO: 0.9 % — SIGNIFICANT CHANGE UP (ref 0–1.5)
LDH SERPL L TO P-CCNC: 364 U/L — HIGH (ref 135–225)
LYMPHOCYTES # BLD AUTO: 3 K/UL — SIGNIFICANT CHANGE UP (ref 1–3.3)
LYMPHOCYTES # BLD AUTO: 54.1 % — HIGH (ref 13–44)
MAGNESIUM SERPL-MCNC: 2 MG/DL — SIGNIFICANT CHANGE UP (ref 1.6–2.6)
MCHC RBC-ENTMCNC: 25.5 PG — LOW (ref 27–34)
MCHC RBC-ENTMCNC: 33.3 % — SIGNIFICANT CHANGE UP (ref 32–36)
MCV RBC AUTO: 76.4 FL — LOW (ref 80–100)
MONOCYTES # BLD AUTO: 0.5 K/UL — SIGNIFICANT CHANGE UP (ref 0–0.9)
MONOCYTES NFR BLD AUTO: 9 % — SIGNIFICANT CHANGE UP (ref 2–14)
NEUTROPHILS # BLD AUTO: 1.3 K/UL — LOW (ref 1.8–7.4)
NEUTROPHILS NFR BLD AUTO: 23.4 % — LOW (ref 43–77)
NRBC # FLD: 0.05 — SIGNIFICANT CHANGE UP
PHOSPHATE SERPL-MCNC: 3.6 MG/DL — SIGNIFICANT CHANGE UP (ref 2.5–4.5)
PLATELET # BLD AUTO: 246 K/UL — SIGNIFICANT CHANGE UP (ref 150–400)
PMV BLD: 9.3 FL — SIGNIFICANT CHANGE UP (ref 7–13)
POTASSIUM SERPL-MCNC: 4.6 MMOL/L — SIGNIFICANT CHANGE UP (ref 3.5–5.3)
POTASSIUM SERPL-SCNC: 4.6 MMOL/L — SIGNIFICANT CHANGE UP (ref 3.5–5.3)
PROT SERPL-MCNC: 6.6 G/DL — SIGNIFICANT CHANGE UP (ref 6–8.3)
RBC # BLD: 2.59 M/UL — LOW (ref 3.8–5.2)
RBC # FLD: 13.7 % — SIGNIFICANT CHANGE UP (ref 10.3–14.5)
RETICS #: 124 K/UL — SIGNIFICANT CHANGE UP (ref 25–125)
RETICS/RBC NFR: 4.8 % — HIGH (ref 0.5–2.5)
RH IG SCN BLD-IMP: POSITIVE — SIGNIFICANT CHANGE UP
SODIUM SERPL-SCNC: 140 MMOL/L — SIGNIFICANT CHANGE UP (ref 135–145)
TSH SERPL-MCNC: 1.57 UIU/ML — SIGNIFICANT CHANGE UP (ref 0.27–4.2)
WBC # BLD: 5.55 K/UL — SIGNIFICANT CHANGE UP (ref 3.8–10.5)
WBC # FLD AUTO: 5.55 K/UL — SIGNIFICANT CHANGE UP (ref 3.8–10.5)

## 2018-11-10 PROCEDURE — 99233 SBSQ HOSP IP/OBS HIGH 50: CPT

## 2018-11-10 RX ORDER — INFLUENZA VIRUS VACCINE 15; 15; 15; 15 UG/.5ML; UG/.5ML; UG/.5ML; UG/.5ML
0.5 SUSPENSION INTRAMUSCULAR ONCE
Qty: 0 | Refills: 0 | Status: DISCONTINUED | OUTPATIENT
Start: 2018-11-10 | End: 2018-11-12

## 2018-11-10 RX ORDER — DOCUSATE SODIUM 100 MG
100 CAPSULE ORAL
Qty: 0 | Refills: 0 | Status: DISCONTINUED | OUTPATIENT
Start: 2018-11-10 | End: 2018-11-12

## 2018-11-10 RX ORDER — MORPHINE SULFATE 50 MG/1
4 CAPSULE, EXTENDED RELEASE ORAL ONCE
Qty: 0 | Refills: 0 | Status: DISCONTINUED | OUTPATIENT
Start: 2018-11-10 | End: 2018-11-10

## 2018-11-10 RX ORDER — FOLIC ACID 0.8 MG
1 TABLET ORAL DAILY
Qty: 0 | Refills: 0 | Status: DISCONTINUED | OUTPATIENT
Start: 2018-11-10 | End: 2018-11-12

## 2018-11-10 RX ORDER — FENTANYL CITRATE 50 UG/ML
1 INJECTION INTRAVENOUS
Qty: 0 | Refills: 0 | COMMUNITY

## 2018-11-10 RX ORDER — MORPHINE SULFATE 50 MG/1
60 CAPSULE, EXTENDED RELEASE ORAL EVERY 12 HOURS
Qty: 0 | Refills: 0 | Status: DISCONTINUED | OUTPATIENT
Start: 2018-11-10 | End: 2018-11-12

## 2018-11-10 RX ORDER — HYDROXYUREA 500 MG/1
2 CAPSULE ORAL
Qty: 0 | Refills: 0 | COMMUNITY

## 2018-11-10 RX ORDER — HYDROXYUREA 500 MG/1
1000 CAPSULE ORAL
Qty: 0 | Refills: 0 | Status: DISCONTINUED | OUTPATIENT
Start: 2018-11-10 | End: 2018-11-12

## 2018-11-10 RX ADMIN — MORPHINE SULFATE 4 MILLIGRAM(S): 50 CAPSULE, EXTENDED RELEASE ORAL at 00:07

## 2018-11-10 RX ADMIN — Medication 100 MILLIGRAM(S): at 17:08

## 2018-11-10 RX ADMIN — MORPHINE SULFATE 4 MILLIGRAM(S): 50 CAPSULE, EXTENDED RELEASE ORAL at 00:23

## 2018-11-10 RX ADMIN — HYDROXYUREA 1000 MILLIGRAM(S): 500 CAPSULE ORAL at 06:08

## 2018-11-10 RX ADMIN — MORPHINE SULFATE 60 MILLIGRAM(S): 50 CAPSULE, EXTENDED RELEASE ORAL at 17:09

## 2018-11-10 RX ADMIN — HYDROXYUREA 1000 MILLIGRAM(S): 500 CAPSULE ORAL at 19:03

## 2018-11-10 RX ADMIN — MORPHINE SULFATE 30 MILLILITER(S): 50 CAPSULE, EXTENDED RELEASE ORAL at 19:29

## 2018-11-10 RX ADMIN — MORPHINE SULFATE 60 MILLIGRAM(S): 50 CAPSULE, EXTENDED RELEASE ORAL at 06:07

## 2018-11-10 RX ADMIN — ENOXAPARIN SODIUM 40 MILLIGRAM(S): 100 INJECTION SUBCUTANEOUS at 06:07

## 2018-11-10 RX ADMIN — MORPHINE SULFATE 30 MILLILITER(S): 50 CAPSULE, EXTENDED RELEASE ORAL at 07:37

## 2018-11-10 RX ADMIN — MORPHINE SULFATE 30 MILLILITER(S): 50 CAPSULE, EXTENDED RELEASE ORAL at 01:04

## 2018-11-10 RX ADMIN — SODIUM CHLORIDE 120 MILLILITER(S): 9 INJECTION, SOLUTION INTRAVENOUS at 06:07

## 2018-11-10 RX ADMIN — Medication 100 MILLIGRAM(S): at 06:08

## 2018-11-10 RX ADMIN — SODIUM CHLORIDE 120 MILLILITER(S): 9 INJECTION, SOLUTION INTRAVENOUS at 01:04

## 2018-11-10 RX ADMIN — Medication 1 MILLIGRAM(S): at 17:09

## 2018-11-10 NOTE — DISCHARGE NOTE ADULT - MEDICATION SUMMARY - MEDICATIONS TO TAKE
I will START or STAY ON the medications listed below when I get home from the hospital:    fentaNYL 50 mcg/hr transdermal film, extended release  -- 1 film(s) by transdermal patch every 72 hours - (last placed 11/06/2018)  -- Indication: For Hb-SS disease with acute chest syndrome    morphine 60 mg/12 hours oral tablet, extended release  -- 1 tab(s) by mouth every 12 hours  -- Indication: For Hb-SS disease with acute chest syndrome    Morphine IR 30 mg oral tablet  -- 1 tab(s) by mouth every 6 hours, As Needed  -- Indication: For Hb-SS disease with acute chest syndrome    hydroxyurea 500 mg oral capsule  -- 2 cap(s) by mouth 2 times a day  -- Indication: For Hb-SS disease with crisis    docusate sodium 100 mg oral capsule  -- 1 cap(s) by mouth 2 times a day  -- Indication: For constipation    senna oral tablet  -- 2 tab(s) by mouth once a day (at bedtime), As Needed   -- Indication: For constipation    folic acid 1 mg oral tablet  -- 1 tab(s) by mouth once a day  -- Indication: For Hb-SS disease with crisis    cholecalciferol oral tablet  -- 1000 unit(s) by mouth once a day  -- Indication: For supplement

## 2018-11-10 NOTE — DISCHARGE NOTE ADULT - PATIENT PORTAL LINK FT
You can access the [facility] Patient Portal, offered by [facility], by registering with the following website: [email or web address]

## 2018-11-10 NOTE — DISCHARGE NOTE ADULT - HOSPITAL COURSE
43 year old female, with past history significant for Hb-SS disease with crisis, Acute chest syndrome and Smoking, presented to the ED secondary to chest pain, back pain and B/L knee pain for one week.  Vital signs upon ED presentation as follows: BP = 94/59  (to 106/56), HR = 83, RR = 16, T = 37 C (98.6 F), O2 Sat = 100% on RA.  CXR significant for "ill defined right lower lobe opacity."  Diagnosed with Acute Chest     Acute chest syndrome:  - chest pain/tightness, with CXR demonstrating "ill defined right lower lobe opacity"  - no reports of fevers, chills, sweating  - likely due to cold & flu-like symptoms > 1 week ago  - saturating 100% on RA  - ECG = NSR at 87 bpm, QTc = 433  - morphine PCA in progress (patient prefers morphine to dilaudid)  - f/u pulse oximetry  - incentive spirometry  - f/u cultures  - f/u AM lab-work  - evaluate for restart of Duragesic 50 mcg (last placed 11/06/2018, but removed on the floor); not continued.  Patient currently with borderline low blood pressure while on morphine PCA  - Hematology consult in the AM  - if signs of respiratory compromise/deterioration - MICU consult (no issues presently). - chest pain/tightness, with CXR demonstrating "ill defined right lower lobe opacity"  - no reports of fevers, chills, sweating  - likely due to cold & flu-like symptoms > 1 week ago  - saturating 100% on RA  - ECG = NSR at 87 bpm, QTc = 433  - morphine PCA in progress (patient prefers morphine to dilaudid)  - f/u pulse oximetry  - incentive spirometry  - f/u cultures  - f/u AM lab-work  - Hematology consult in the AM  - if signs of respiratory compromise/deterioration - MICU consult (no issues presently)       Abnormal chest x-ray:  - RLL opacity, ill defined  - in the setting of cold flu-like signs/symptoms ~ 1 week ago.  - started on levofloxacin 750 mg daily; continuing  - f/u cultures  - f/u pulse oximetry.       Dehydration:   - very dry oral mucosa and skin  - prescribed  mL NS in the ED.  Additional 1 liter LR bolus prescribed, and continued on 0.45 NS at 120 mL/Hr  - encourage oral hydration  - f/u for improvement.     -Dispo--> 43 year old female, with past history significant for Hb-SS disease with crisis, Acute chest syndrome and Smoking, presented to the ED secondary to chest pain, back pain and B/L knee pain for one week.  Vital signs upon ED presentation as follows: BP = 94/59  (to 106/56), HR = 83, RR = 16, T = 37 C (98.6 F), O2 Sat = 100% on RA.  CXR significant for "ill defined right lower lobe opacity."  Diagnosed with Acute Chest     Acute chest syndrome- CXR- Clear lungs. No pleural effusions or pneumothorax, saturating 100% on RA. ECG = NSR at 87 bpm, QTc = 433. Treated with Morphine PCA, Incentive spirometry. Blood cultures- NTD    Abnormal chest x-ray- RLL opacity, ill defined- in the setting of cold flu-like signs/symptoms ~ 1 week ago. S/p Levofloxacin 750 mg x1 in ED    Dehydration- very dry oral mucosa and skin, prescribed IVF   Dispo-->home

## 2018-11-10 NOTE — DISCHARGE NOTE ADULT - CARE PROVIDER_API CALL
Estrella Salazar), Hematology; Medical Oncology  [address]  [place] [address]  Phone: [phone]  Fax: [phone]

## 2018-11-10 NOTE — DISCHARGE NOTE ADULT - PLAN OF CARE
Stable Resolved - CXR- Clear lungs. No pleural effusions or pneumothorax.  - saturating 100% on RA, ECG = NSR at 87 bpm, QTc = 433  - you were treated with Morphine PCA in the hospital   - Incentive spirometry, continue with bowel regimen  - blood cultures- Negative   - Follow up with PCP as outpatient for further management and treatment - you were hydrated with IVF in the hospital  - Follow up with PCP as outpatient for further management and treatment - stay well hydrated, continue with bowel regimen, avoid crowded places  - Follow up with PCP as outpatient for further management and treatment - CXR- Clear lungs. No pleural effusions or pneumothorax.  - saturating 100% on RA, ECG = NSR at 87 bpm, QTc = 433  - you were treated with Morphine PCA in the hospital   - Incentive spirometry, continue with bowel regimen  - blood cultures- Negative   - Follow up with PCP Dr. Salazar as outpatient for further management and treatment

## 2018-11-10 NOTE — DISCHARGE NOTE ADULT - CARE PLAN
Principal Discharge DX:	Acute chest syndrome  Goal:	Stable  Secondary Diagnosis:	Dehydration  Goal:	Resolved  Secondary Diagnosis:	Hb-SS disease with crisis  Goal:	Stable Principal Discharge DX:	Acute chest syndrome  Goal:	Stable  Assessment and plan of treatment:	- CXR- Clear lungs. No pleural effusions or pneumothorax.  - saturating 100% on RA, ECG = NSR at 87 bpm, QTc = 433  - you were treated with Morphine PCA in the hospital   - Incentive spirometry, continue with bowel regimen  - blood cultures- Negative   - Follow up with PCP as outpatient for further management and treatment  Secondary Diagnosis:	Dehydration  Goal:	Resolved  Assessment and plan of treatment:	- you were hydrated with IVF in the hospital  - Follow up with PCP as outpatient for further management and treatment  Secondary Diagnosis:	Hb-SS disease with crisis  Goal:	Stable  Assessment and plan of treatment:	- stay well hydrated, continue with bowel regimen, avoid crowded places  - Follow up with PCP as outpatient for further management and treatment Principal Discharge DX:	Acute chest syndrome  Goal:	Stable  Assessment and plan of treatment:	- CXR- Clear lungs. No pleural effusions or pneumothorax.  - saturating 100% on RA, ECG = NSR at 87 bpm, QTc = 433  - you were treated with Morphine PCA in the hospital   - Incentive spirometry, continue with bowel regimen  - blood cultures- Negative   - Follow up with PCP Dr. Salazar as outpatient for further management and treatment  Secondary Diagnosis:	Dehydration  Goal:	Resolved  Assessment and plan of treatment:	- you were hydrated with IVF in the hospital  - Follow up with PCP as outpatient for further management and treatment  Secondary Diagnosis:	Hb-SS disease with crisis  Goal:	Stable  Assessment and plan of treatment:	- stay well hydrated, continue with bowel regimen, avoid crowded places  - Follow up with PCP as outpatient for further management and treatment

## 2018-11-11 DIAGNOSIS — E55.9 VITAMIN D DEFICIENCY, UNSPECIFIED: ICD-10-CM

## 2018-11-11 LAB
24R-OH-CALCIDIOL SERPL-MCNC: 15.7 NG/ML — LOW (ref 30–80)
ALBUMIN SERPL ELPH-MCNC: 3.6 G/DL — SIGNIFICANT CHANGE UP (ref 3.3–5)
ALP SERPL-CCNC: 47 U/L — SIGNIFICANT CHANGE UP (ref 40–120)
ALT FLD-CCNC: 4 U/L — SIGNIFICANT CHANGE UP (ref 4–33)
AST SERPL-CCNC: 33 U/L — HIGH (ref 4–32)
BASOPHILS # BLD AUTO: 0.06 K/UL — SIGNIFICANT CHANGE UP (ref 0–0.2)
BASOPHILS NFR BLD AUTO: 1.1 % — SIGNIFICANT CHANGE UP (ref 0–2)
BILIRUB SERPL-MCNC: 1.6 MG/DL — HIGH (ref 0.2–1.2)
BUN SERPL-MCNC: 7 MG/DL — SIGNIFICANT CHANGE UP (ref 7–23)
CALCIUM SERPL-MCNC: 8.1 MG/DL — LOW (ref 8.4–10.5)
CHLORIDE SERPL-SCNC: 109 MMOL/L — HIGH (ref 98–107)
CO2 SERPL-SCNC: 21 MMOL/L — LOW (ref 22–31)
CREAT SERPL-MCNC: 0.85 MG/DL — SIGNIFICANT CHANGE UP (ref 0.5–1.3)
EOSINOPHIL # BLD AUTO: 0.75 K/UL — HIGH (ref 0–0.5)
EOSINOPHIL NFR BLD AUTO: 13.1 % — HIGH (ref 0–6)
GLUCOSE SERPL-MCNC: 107 MG/DL — HIGH (ref 70–99)
HCT VFR BLD CALC: 19.4 % — CRITICAL LOW (ref 34.5–45)
HGB BLD-MCNC: 6.6 G/DL — CRITICAL LOW (ref 11.5–15.5)
IMM GRANULOCYTES # BLD AUTO: 0.02 # — SIGNIFICANT CHANGE UP
IMM GRANULOCYTES NFR BLD AUTO: 0.4 % — SIGNIFICANT CHANGE UP (ref 0–1.5)
LDH SERPL L TO P-CCNC: 346 U/L — HIGH (ref 135–225)
LYMPHOCYTES # BLD AUTO: 2.94 K/UL — SIGNIFICANT CHANGE UP (ref 1–3.3)
LYMPHOCYTES # BLD AUTO: 51.5 % — HIGH (ref 13–44)
MCHC RBC-ENTMCNC: 26.4 PG — LOW (ref 27–34)
MCHC RBC-ENTMCNC: 34 % — SIGNIFICANT CHANGE UP (ref 32–36)
MCV RBC AUTO: 77.6 FL — LOW (ref 80–100)
MONOCYTES # BLD AUTO: 0.57 K/UL — SIGNIFICANT CHANGE UP (ref 0–0.9)
MONOCYTES NFR BLD AUTO: 10 % — SIGNIFICANT CHANGE UP (ref 2–14)
NEUTROPHILS # BLD AUTO: 1.37 K/UL — LOW (ref 1.8–7.4)
NEUTROPHILS NFR BLD AUTO: 23.9 % — LOW (ref 43–77)
NRBC # FLD: 0.05 — SIGNIFICANT CHANGE UP
PLATELET # BLD AUTO: 222 K/UL — SIGNIFICANT CHANGE UP (ref 150–400)
PMV BLD: 9.6 FL — SIGNIFICANT CHANGE UP (ref 7–13)
POTASSIUM SERPL-MCNC: 4 MMOL/L — SIGNIFICANT CHANGE UP (ref 3.5–5.3)
POTASSIUM SERPL-SCNC: 4 MMOL/L — SIGNIFICANT CHANGE UP (ref 3.5–5.3)
PROT SERPL-MCNC: 6.9 G/DL — SIGNIFICANT CHANGE UP (ref 6–8.3)
RBC # BLD: 2.5 M/UL — LOW (ref 3.8–5.2)
RBC # FLD: 13.2 % — SIGNIFICANT CHANGE UP (ref 10.3–14.5)
RETICS #: 85 K/UL — SIGNIFICANT CHANGE UP (ref 25–125)
RETICS/RBC NFR: 3.4 % — HIGH (ref 0.5–2.5)
SODIUM SERPL-SCNC: 139 MMOL/L — SIGNIFICANT CHANGE UP (ref 135–145)
SPECIMEN SOURCE: SIGNIFICANT CHANGE UP
SPECIMEN SOURCE: SIGNIFICANT CHANGE UP
WBC # BLD: 5.71 K/UL — SIGNIFICANT CHANGE UP (ref 3.8–10.5)
WBC # FLD AUTO: 5.71 K/UL — SIGNIFICANT CHANGE UP (ref 3.8–10.5)

## 2018-11-11 PROCEDURE — 99233 SBSQ HOSP IP/OBS HIGH 50: CPT

## 2018-11-11 RX ORDER — CHOLECALCIFEROL (VITAMIN D3) 125 MCG
1000 CAPSULE ORAL
Qty: 30 | Refills: 0
Start: 2018-11-11 | End: 2018-12-10

## 2018-11-11 RX ORDER — FOLIC ACID 0.8 MG
1 TABLET ORAL
Qty: 0 | Refills: 0 | COMMUNITY

## 2018-11-11 RX ORDER — CHOLECALCIFEROL (VITAMIN D3) 125 MCG
1000 CAPSULE ORAL
Qty: 30 | Refills: 0 | OUTPATIENT
Start: 2018-11-11 | End: 2018-12-10

## 2018-11-11 RX ORDER — FENTANYL CITRATE 50 UG/ML
1 INJECTION INTRAVENOUS
Qty: 0 | Refills: 0 | Status: DISCONTINUED | OUTPATIENT
Start: 2018-11-11 | End: 2018-11-11

## 2018-11-11 RX ORDER — SENNA PLUS 8.6 MG/1
2 TABLET ORAL
Qty: 60 | Refills: 0
Start: 2018-11-11 | End: 2018-12-10

## 2018-11-11 RX ORDER — DOCUSATE SODIUM 100 MG
1 CAPSULE ORAL
Qty: 0 | Refills: 0 | DISCHARGE
Start: 2018-11-11

## 2018-11-11 RX ORDER — ONDANSETRON 8 MG/1
4 TABLET, FILM COATED ORAL EVERY 6 HOURS
Qty: 0 | Refills: 0 | Status: DISCONTINUED | OUTPATIENT
Start: 2018-11-11 | End: 2018-11-12

## 2018-11-11 RX ORDER — MORPHINE SULFATE 50 MG/1
30 CAPSULE, EXTENDED RELEASE ORAL EVERY 6 HOURS
Qty: 0 | Refills: 0 | Status: DISCONTINUED | OUTPATIENT
Start: 2018-11-11 | End: 2018-11-11

## 2018-11-11 RX ORDER — FOLIC ACID 0.8 MG
1 TABLET ORAL
Qty: 30 | Refills: 0
Start: 2018-11-11 | End: 2018-12-10

## 2018-11-11 RX ORDER — ACETAMINOPHEN 500 MG
1000 TABLET ORAL ONCE
Qty: 0 | Refills: 0 | Status: COMPLETED | OUTPATIENT
Start: 2018-11-11 | End: 2018-11-11

## 2018-11-11 RX ORDER — FOLIC ACID 0.8 MG
1 TABLET ORAL
Qty: 30 | Refills: 0 | OUTPATIENT
Start: 2018-11-11 | End: 2018-12-10

## 2018-11-11 RX ORDER — SENNA PLUS 8.6 MG/1
2 TABLET ORAL
Qty: 60 | Refills: 0 | OUTPATIENT
Start: 2018-11-11 | End: 2018-12-10

## 2018-11-11 RX ORDER — POLYETHYLENE GLYCOL 3350 17 G/17G
17 POWDER, FOR SOLUTION ORAL DAILY
Qty: 0 | Refills: 0 | Status: DISCONTINUED | OUTPATIENT
Start: 2018-11-11 | End: 2018-11-12

## 2018-11-11 RX ORDER — SENNA PLUS 8.6 MG/1
2 TABLET ORAL AT BEDTIME
Qty: 0 | Refills: 0 | Status: DISCONTINUED | OUTPATIENT
Start: 2018-11-11 | End: 2018-11-12

## 2018-11-11 RX ORDER — CHOLECALCIFEROL (VITAMIN D3) 125 MCG
1000 CAPSULE ORAL DAILY
Qty: 0 | Refills: 0 | Status: DISCONTINUED | OUTPATIENT
Start: 2018-11-11 | End: 2018-11-12

## 2018-11-11 RX ORDER — NALOXONE HYDROCHLORIDE 4 MG/.1ML
0.1 SPRAY NASAL
Qty: 0 | Refills: 0 | Status: DISCONTINUED | OUTPATIENT
Start: 2018-11-11 | End: 2018-11-12

## 2018-11-11 RX ORDER — MORPHINE SULFATE 50 MG/1
30 CAPSULE, EXTENDED RELEASE ORAL
Qty: 0 | Refills: 0 | Status: DISCONTINUED | OUTPATIENT
Start: 2018-11-11 | End: 2018-11-12

## 2018-11-11 RX ADMIN — MORPHINE SULFATE 30 MILLILITER(S): 50 CAPSULE, EXTENDED RELEASE ORAL at 19:23

## 2018-11-11 RX ADMIN — MORPHINE SULFATE 60 MILLIGRAM(S): 50 CAPSULE, EXTENDED RELEASE ORAL at 05:34

## 2018-11-11 RX ADMIN — Medication 100 MILLIGRAM(S): at 05:34

## 2018-11-11 RX ADMIN — Medication 1000 MILLIGRAM(S): at 16:48

## 2018-11-11 RX ADMIN — FENTANYL CITRATE 1 PATCH: 50 INJECTION INTRAVENOUS at 17:57

## 2018-11-11 RX ADMIN — MORPHINE SULFATE 30 MILLILITER(S): 50 CAPSULE, EXTENDED RELEASE ORAL at 07:39

## 2018-11-11 RX ADMIN — HYDROXYUREA 1000 MILLIGRAM(S): 500 CAPSULE ORAL at 17:14

## 2018-11-11 RX ADMIN — POLYETHYLENE GLYCOL 3350 17 GRAM(S): 17 POWDER, FOR SOLUTION ORAL at 12:07

## 2018-11-11 RX ADMIN — SENNA PLUS 2 TABLET(S): 8.6 TABLET ORAL at 21:19

## 2018-11-11 RX ADMIN — MORPHINE SULFATE 30 MILLIGRAM(S): 50 CAPSULE, EXTENDED RELEASE ORAL at 12:41

## 2018-11-11 RX ADMIN — SODIUM CHLORIDE 120 MILLILITER(S): 9 INJECTION, SOLUTION INTRAVENOUS at 18:05

## 2018-11-11 RX ADMIN — MORPHINE SULFATE 30 MILLILITER(S): 50 CAPSULE, EXTENDED RELEASE ORAL at 18:05

## 2018-11-11 RX ADMIN — MORPHINE SULFATE 60 MILLIGRAM(S): 50 CAPSULE, EXTENDED RELEASE ORAL at 06:22

## 2018-11-11 RX ADMIN — Medication 1 MILLIGRAM(S): at 17:14

## 2018-11-11 RX ADMIN — ENOXAPARIN SODIUM 40 MILLIGRAM(S): 100 INJECTION SUBCUTANEOUS at 05:34

## 2018-11-11 RX ADMIN — SODIUM CHLORIDE 120 MILLILITER(S): 9 INJECTION, SOLUTION INTRAVENOUS at 07:40

## 2018-11-11 RX ADMIN — SODIUM CHLORIDE 120 MILLILITER(S): 9 INJECTION, SOLUTION INTRAVENOUS at 14:04

## 2018-11-11 RX ADMIN — Medication 400 MILLIGRAM(S): at 15:58

## 2018-11-11 RX ADMIN — HYDROXYUREA 1000 MILLIGRAM(S): 500 CAPSULE ORAL at 05:34

## 2018-11-11 RX ADMIN — MORPHINE SULFATE 30 MILLIGRAM(S): 50 CAPSULE, EXTENDED RELEASE ORAL at 13:30

## 2018-11-11 RX ADMIN — Medication 1000 UNIT(S): at 17:14

## 2018-11-11 RX ADMIN — MORPHINE SULFATE 60 MILLIGRAM(S): 50 CAPSULE, EXTENDED RELEASE ORAL at 17:14

## 2018-11-11 RX ADMIN — FENTANYL CITRATE 1 PATCH: 50 INJECTION INTRAVENOUS at 12:07

## 2018-11-11 RX ADMIN — Medication 100 MILLIGRAM(S): at 17:14

## 2018-11-12 VITALS
RESPIRATION RATE: 18 BRPM | TEMPERATURE: 99 F | OXYGEN SATURATION: 100 % | SYSTOLIC BLOOD PRESSURE: 104 MMHG | DIASTOLIC BLOOD PRESSURE: 62 MMHG | HEART RATE: 71 BPM

## 2018-11-12 DIAGNOSIS — Z29.9 ENCOUNTER FOR PROPHYLACTIC MEASURES, UNSPECIFIED: ICD-10-CM

## 2018-11-12 DIAGNOSIS — D57.00 HB-SS DISEASE WITH CRISIS, UNSPECIFIED: ICD-10-CM

## 2018-11-12 LAB
ALBUMIN SERPL ELPH-MCNC: 3.7 G/DL — SIGNIFICANT CHANGE UP (ref 3.3–5)
ALP SERPL-CCNC: 46 U/L — SIGNIFICANT CHANGE UP (ref 40–120)
ALT FLD-CCNC: 8 U/L — SIGNIFICANT CHANGE UP (ref 4–33)
AST SERPL-CCNC: 38 U/L — HIGH (ref 4–32)
BASOPHILS # BLD AUTO: 0.05 K/UL — SIGNIFICANT CHANGE UP (ref 0–0.2)
BASOPHILS NFR BLD AUTO: 1 % — SIGNIFICANT CHANGE UP (ref 0–2)
BILIRUB SERPL-MCNC: 1.8 MG/DL — HIGH (ref 0.2–1.2)
BUN SERPL-MCNC: 9 MG/DL — SIGNIFICANT CHANGE UP (ref 7–23)
CALCIUM SERPL-MCNC: 8.5 MG/DL — SIGNIFICANT CHANGE UP (ref 8.4–10.5)
CHLORIDE SERPL-SCNC: 106 MMOL/L — SIGNIFICANT CHANGE UP (ref 98–107)
CO2 SERPL-SCNC: 23 MMOL/L — SIGNIFICANT CHANGE UP (ref 22–31)
CREAT SERPL-MCNC: 0.84 MG/DL — SIGNIFICANT CHANGE UP (ref 0.5–1.3)
EOSINOPHIL # BLD AUTO: 0.68 K/UL — HIGH (ref 0–0.5)
EOSINOPHIL NFR BLD AUTO: 13.2 % — HIGH (ref 0–6)
GLUCOSE SERPL-MCNC: 90 MG/DL — SIGNIFICANT CHANGE UP (ref 70–99)
HCT VFR BLD CALC: 18.8 % — CRITICAL LOW (ref 34.5–45)
HGB BLD-MCNC: 6.5 G/DL — CRITICAL LOW (ref 11.5–15.5)
IMM GRANULOCYTES # BLD AUTO: 0.02 # — SIGNIFICANT CHANGE UP
IMM GRANULOCYTES NFR BLD AUTO: 0.4 % — SIGNIFICANT CHANGE UP (ref 0–1.5)
LDH SERPL L TO P-CCNC: 364 U/L — HIGH (ref 135–225)
LYMPHOCYTES # BLD AUTO: 2.67 K/UL — SIGNIFICANT CHANGE UP (ref 1–3.3)
LYMPHOCYTES # BLD AUTO: 51.6 % — HIGH (ref 13–44)
MCHC RBC-ENTMCNC: 26.6 PG — LOW (ref 27–34)
MCHC RBC-ENTMCNC: 34.6 % — SIGNIFICANT CHANGE UP (ref 32–36)
MCV RBC AUTO: 77 FL — LOW (ref 80–100)
MONOCYTES # BLD AUTO: 0.36 K/UL — SIGNIFICANT CHANGE UP (ref 0–0.9)
MONOCYTES NFR BLD AUTO: 7 % — SIGNIFICANT CHANGE UP (ref 2–14)
NEUTROPHILS # BLD AUTO: 1.39 K/UL — LOW (ref 1.8–7.4)
NEUTROPHILS NFR BLD AUTO: 26.8 % — LOW (ref 43–77)
NRBC # FLD: 0.03 — SIGNIFICANT CHANGE UP
PLATELET # BLD AUTO: 227 K/UL — SIGNIFICANT CHANGE UP (ref 150–400)
PMV BLD: 9.7 FL — SIGNIFICANT CHANGE UP (ref 7–13)
POTASSIUM SERPL-MCNC: 4.4 MMOL/L — SIGNIFICANT CHANGE UP (ref 3.5–5.3)
POTASSIUM SERPL-SCNC: 4.4 MMOL/L — SIGNIFICANT CHANGE UP (ref 3.5–5.3)
PROT SERPL-MCNC: 6.9 G/DL — SIGNIFICANT CHANGE UP (ref 6–8.3)
RBC # BLD: 2.44 M/UL — LOW (ref 3.8–5.2)
RBC # FLD: 13.1 % — SIGNIFICANT CHANGE UP (ref 10.3–14.5)
RETICS #: 54 K/UL — SIGNIFICANT CHANGE UP (ref 25–125)
RETICS/RBC NFR: 2.2 % — SIGNIFICANT CHANGE UP (ref 0.5–2.5)
SODIUM SERPL-SCNC: 139 MMOL/L — SIGNIFICANT CHANGE UP (ref 135–145)
WBC # BLD: 5.17 K/UL — SIGNIFICANT CHANGE UP (ref 3.8–10.5)
WBC # FLD AUTO: 5.17 K/UL — SIGNIFICANT CHANGE UP (ref 3.8–10.5)

## 2018-11-12 PROCEDURE — 99239 HOSP IP/OBS DSCHRG MGMT >30: CPT

## 2018-11-12 RX ORDER — MORPHINE SULFATE 50 MG/1
30 CAPSULE, EXTENDED RELEASE ORAL EVERY 6 HOURS
Qty: 0 | Refills: 0 | Status: DISCONTINUED | OUTPATIENT
Start: 2018-11-12 | End: 2018-11-12

## 2018-11-12 RX ORDER — FENTANYL CITRATE 50 UG/ML
1 INJECTION INTRAVENOUS
Qty: 0 | Refills: 0 | Status: DISCONTINUED | OUTPATIENT
Start: 2018-11-12 | End: 2018-11-12

## 2018-11-12 RX ORDER — ACETAMINOPHEN 500 MG
1000 TABLET ORAL ONCE
Qty: 0 | Refills: 0 | Status: COMPLETED | OUTPATIENT
Start: 2018-11-12 | End: 2018-11-12

## 2018-11-12 RX ADMIN — MORPHINE SULFATE 60 MILLIGRAM(S): 50 CAPSULE, EXTENDED RELEASE ORAL at 05:24

## 2018-11-12 RX ADMIN — FENTANYL CITRATE 1 PATCH: 50 INJECTION INTRAVENOUS at 18:46

## 2018-11-12 RX ADMIN — Medication 400 MILLIGRAM(S): at 01:47

## 2018-11-12 RX ADMIN — FENTANYL CITRATE 1 PATCH: 50 INJECTION INTRAVENOUS at 17:29

## 2018-11-12 RX ADMIN — Medication 1000 MILLIGRAM(S): at 02:20

## 2018-11-12 RX ADMIN — Medication 100 MILLIGRAM(S): at 17:30

## 2018-11-12 RX ADMIN — HYDROXYUREA 1000 MILLIGRAM(S): 500 CAPSULE ORAL at 05:23

## 2018-11-12 RX ADMIN — Medication 1000 UNIT(S): at 17:30

## 2018-11-12 RX ADMIN — SODIUM CHLORIDE 120 MILLILITER(S): 9 INJECTION, SOLUTION INTRAVENOUS at 07:21

## 2018-11-12 RX ADMIN — Medication 1 MILLIGRAM(S): at 17:30

## 2018-11-12 RX ADMIN — MORPHINE SULFATE 30 MILLILITER(S): 50 CAPSULE, EXTENDED RELEASE ORAL at 07:22

## 2018-11-12 RX ADMIN — Medication 100 MILLIGRAM(S): at 05:23

## 2018-11-12 RX ADMIN — ENOXAPARIN SODIUM 40 MILLIGRAM(S): 100 INJECTION SUBCUTANEOUS at 05:24

## 2018-11-12 RX ADMIN — MORPHINE SULFATE 60 MILLIGRAM(S): 50 CAPSULE, EXTENDED RELEASE ORAL at 17:29

## 2018-11-12 RX ADMIN — HYDROXYUREA 1000 MILLIGRAM(S): 500 CAPSULE ORAL at 17:30

## 2018-11-12 NOTE — PROGRESS NOTE ADULT - ATTENDING COMMENTS
If pt tolerating PO regiment, plan to d/c home later this afternoon, discharge time 31min
Dispo: if pain controlled off PCA, can dc home. DC time: 32 min

## 2018-11-12 NOTE — PROGRESS NOTE ADULT - SUBJECTIVE AND OBJECTIVE BOX
Patient is a 43y old  Female who presents with a chief complaint of Acute Chest Syndrome (11 Nov 2018 10:28)      SUBJECTIVE / OVERNIGHT EVENTS: No acute events overnight. Reports pain is 3/10 and mainly in center of chest and lower back typical of her sickle cell crises. Denies N/V/D/constipation and SOB. Wants to go home today and attempt coming off PCA if possible later this afternoon.    MEDICATIONS  (STANDING):  cholecalciferol 1000 Unit(s) Oral daily  docusate sodium 100 milliGRAM(s) Oral two times a day  enoxaparin Injectable 40 milliGRAM(s) SubCutaneous every 24 hours  folic acid 1 milliGRAM(s) Oral daily  hydroxyurea 1000 milliGRAM(s) Oral two times a day  influenza   Vaccine 0.5 milliLiter(s) IntraMuscular once  morphine ER Tablet 60 milliGRAM(s) Oral every 12 hours  morphine PCA (5 mG/mL) 30 milliLiter(s) PCA Continuous PCA Continuous  polyethylene glycol 3350 17 Gram(s) Oral daily  senna 2 Tablet(s) Oral at bedtime  sodium chloride 0.45%. 1000 milliLiter(s) (120 mL/Hr) IV Continuous <Continuous>    MEDICATIONS  (PRN):  diphenhydrAMINE 25 milliGRAM(s) Oral every 4 hours PRN Rash and/or Itching  naloxone Injectable 0.1 milliGRAM(s) IV Push every 3 minutes PRN For ANY of the following changes in patient status:  A. RR LESS THAN 10 breaths per minute, B. Oxygen saturation LESS THAN 90%, C. Sedation score of 6  ondansetron Injectable 4 milliGRAM(s) IV Push every 6 hours PRN Nausea      T(C): 36.6 (11-12-18 @ 11:30), Max: 37 (11-11-18 @ 20:42)  HR: 61 (11-12-18 @ 11:30) (61 - 75)  BP: 105/61 (11-12-18 @ 11:30) (102/58 - 118/69)  RR: 18 (11-12-18 @ 11:30) (16 - 18)  SpO2: 100% (11-12-18 @ 11:30) (98% - 100%)  CAPILLARY BLOOD GLUCOSE        I&O's Summary      PHYSICAL EXAM:  GENERAL: no apparent distress, on room air  EYES: sclera mildly icteric  CHEST/LUNG: Clear to auscultation bilaterally; No wheezing or crackles  HEART: Regular rate and rhythm; No murmurs, rubs, or gallops  ABDOMEN: Soft, Nontender, Nondistended; Bowel sounds present  EXTREMITIES:  2+ Peripheral Pulses, No clubbing, cyanosis, or edema  NEUROLOGY: awake, alert, responds to Qs appropriately, no focal deficits  SKIN: No rashes or lesions    LABS:                        6.5    5.17  )-----------( 227      ( 12 Nov 2018 07:45 )             18.8     11-12    139  |  106  |  9   ----------------------------<  90  4.4   |  23  |  0.84    Ca    8.5      12 Nov 2018 08:45    TPro  6.9  /  Alb  3.7  /  TBili  1.8<H>  /  DBili  x   /  AST  38<H>  /  ALT  8   /  AlkPhos  46  11-12              RADIOLOGY & ADDITIONAL TESTS:
Patient is a 43y old  Female who presents with a chief complaint of Acute Chest Syndrome (10 Nov 2018 04:59)      SUBJECTIVE / OVERNIGHT EVENTS: pt reports persistent but improvingn back and chest pain. Requesting to keep PCA for the day and transition to PO meds tomorrow.    MEDICATIONS  (STANDING):  docusate sodium 100 milliGRAM(s) Oral two times a day  enoxaparin Injectable 40 milliGRAM(s) SubCutaneous every 24 hours  folic acid 1 milliGRAM(s) Oral daily  hydroxyurea 1000 milliGRAM(s) Oral two times a day  influenza   Vaccine 0.5 milliLiter(s) IntraMuscular once  morphine ER Tablet 60 milliGRAM(s) Oral every 12 hours  morphine PCA (5 mG/mL) 30 milliLiter(s) PCA Continuous PCA Continuous  sodium chloride 0.45%. 1000 milliLiter(s) (120 mL/Hr) IV Continuous <Continuous>    MEDICATIONS  (PRN):  diphenhydrAMINE 25 milliGRAM(s) Oral every 4 hours PRN Rash and/or Itching  naloxone Injectable 0.1 milliGRAM(s) IV Push every 3 minutes PRN For ANY of the following changes in patient status:  A. RR LESS THAN 10 breaths per minute, B. Oxygen saturation LESS THAN 90%, C. Sedation score of 6  ondansetron Injectable 4 milliGRAM(s) IV Push every 6 hours PRN Nausea      Vital Signs Last 24 Hrs  T(C): 36.7 (11-10-18 @ 05:04), Max: 37 (11-09-18 @ 16:09)  T(F): 98.1 (11-10-18 @ 05:04), Max: 98.6 (11-09-18 @ 16:09)  HR: 78 (11-10-18 @ 05:04) (68 - 84)  BP: 105/65 (11-10-18 @ 05:04) (94/59 - 106/56)  BP(mean): --  RR: 17 (11-10-18 @ 05:04) (15 - 17)  SpO2: 100% (11-10-18 @ 05:04) (99% - 100%)  CAPILLARY BLOOD GLUCOSE        I&O's Summary      PHYSICAL EXAM:  GENERAL: NAD, well-nourished  HEENT: mmm  CHEST/LUNG: CTABL  HEART: RRR, no m/r/g  ABDOMEN: soft, nt, nd  EXTREMITIES:  wwp, no c/c/e  PSYCH: AAOx3  NEUROLOGY: non-focal  SKIN: No rashes or lesions    LABS:                        6.6    5.55  )-----------( 246      ( 10 Nov 2018 07:08 )             19.8     11-10    140  |  105  |  7   ----------------------------<  89  4.6   |  21<L>  |  0.92    Ca    8.3<L>      10 Nov 2018 07:08  Phos  3.6     11-10  Mg     2.0     11-10    TPro  6.6  /  Alb  3.8  /  TBili  2.1<H>  /  DBili  x   /  AST  36<H>  /  ALT  6   /  AlkPhos  48  11-10    PT/INR - ( 09 Nov 2018 18:00 )   PT: 12.3 SEC;   INR: 1.08          PTT - ( 09 Nov 2018 18:00 )  PTT:33.9 SEC          RADIOLOGY & ADDITIONAL TESTS:    Imaging Personally Reviewed:    Consultant(s) Notes Reviewed:      Care Discussed with Consultants/Other Providers:
Patient is a 43y old  Female who presents with a chief complaint of Acute Chest Syndrome (10 Nov 2018 11:36)      SUBJECTIVE / OVERNIGHT EVENTS: Pt reports pain this am but overall improvement, would like to transition to PO meds this afternoon and hopes to go home in the evening if pain is controlled on home regiment.    MEDICATIONS  (STANDING):  docusate sodium 100 milliGRAM(s) Oral two times a day  enoxaparin Injectable 40 milliGRAM(s) SubCutaneous every 24 hours  folic acid 1 milliGRAM(s) Oral daily  hydroxyurea 1000 milliGRAM(s) Oral two times a day  influenza   Vaccine 0.5 milliLiter(s) IntraMuscular once  morphine ER Tablet 60 milliGRAM(s) Oral every 12 hours  morphine PCA (5 mG/mL) 30 milliLiter(s) PCA Continuous PCA Continuous  polyethylene glycol 3350 17 Gram(s) Oral daily  senna 2 Tablet(s) Oral at bedtime  sodium chloride 0.45%. 1000 milliLiter(s) (120 mL/Hr) IV Continuous <Continuous>    MEDICATIONS  (PRN):  diphenhydrAMINE 25 milliGRAM(s) Oral every 4 hours PRN Rash and/or Itching  naloxone Injectable 0.1 milliGRAM(s) IV Push every 3 minutes PRN For ANY of the following changes in patient status:  A. RR LESS THAN 10 breaths per minute, B. Oxygen saturation LESS THAN 90%, C. Sedation score of 6  ondansetron Injectable 4 milliGRAM(s) IV Push every 6 hours PRN Nausea      Vital Signs Last 24 Hrs  T(C): 36.7 (11-11-18 @ 07:30), Max: 37.3 (11-10-18 @ 13:00)  T(F): 98.1 (11-11-18 @ 07:30), Max: 99.1 (11-10-18 @ 13:00)  HR: 69 (11-11-18 @ 07:30) (66 - 77)  BP: 100/57 (11-11-18 @ 07:30) (97/59 - 108/62)  BP(mean): --  RR: 17 (11-11-18 @ 07:30) (17 - 18)  SpO2: 100% (11-11-18 @ 07:30) (100% - 100%)  CAPILLARY BLOOD GLUCOSE        I&O's Summary      PHYSICAL EXAM:  GENERAL: NAD, well-nourished  HEENT: mmm  CHEST/LUNG: CTABL  HEART: RRR, no m/r/g  ABDOMEN: soft, nt, nd  EXTREMITIES:  wwp, no c/c/e  PSYCH: AAOx3  NEUROLOGY: non-focal  SKIN: No rashes or lesions    LABS:                        6.6    5.71  )-----------( 222      ( 11 Nov 2018 05:25 )             19.4     11-11    139  |  109<H>  |  7   ----------------------------<  107<H>  4.0   |  21<L>  |  0.85    Ca    8.1<L>      11 Nov 2018 05:25  Phos  3.6     11-10  Mg     2.0     11-10    TPro  6.9  /  Alb  3.6  /  TBili  1.6<H>  /  DBili  x   /  AST  33<H>  /  ALT  4   /  AlkPhos  47  11-11    PT/INR - ( 09 Nov 2018 18:00 )   PT: 12.3 SEC;   INR: 1.08          PTT - ( 09 Nov 2018 18:00 )  PTT:33.9 SEC          RADIOLOGY & ADDITIONAL TESTS:    Imaging Personally Reviewed:    Consultant(s) Notes Reviewed:      Care Discussed with Consultants/Other Providers:

## 2018-11-12 NOTE — PROGRESS NOTE ADULT - ASSESSMENT
43 year old female, with past history significant for Hb-SS disease with crisis, Acute chest syndrome and Smoking, presented to the ED secondary to chest pain, back pain and B/L knee pain for one week. Admitted with [facility]
43 year old female, with past history significant for Hb-SS disease with crisis, Acute chest syndrome and Smoking, presented to the ED secondary to chest pain, back pain and B/L knee pain for one week.
43 year old female, with past history significant for Hb-SS disease with crisis, Acute chest syndrome and Smoking, presented to the ED secondary to chest pain, back pain and B/L knee pain for one week.

## 2018-11-12 NOTE — PROGRESS NOTE ADULT - PROBLEM SELECTOR PLAN 1
Continue morphine PCA,1/2 NS IVF  Can attempt to stop PCA once patient feels well enough and give home dose Oxy IR and then continue PRN  Continue home MS Contin q12  continue hydrea
- chest pain/tightness, however cxr clear, exam benign, pt without cough or desat, low c/f ACS  - no reports of fevers, chills, sweating  - c/w morphine PCA for today, plan to transition to home regiment tomorrow  - monitor pulse oximetry  - incentive spirometry  - monitor LDH, retic, CBC, hapto daily  - if signs of respiratory compromise/deterioration - MICU consult (no issues presently)  - hgb drop however this is likely dilutional rather than hemolytic given last discharge hgb was 6.6, pt reports sig decreased PO intake and was dry on exam on admission
- chest pain/tightness, however cxr clear, exam benign, pt without cough or desat, low c/f ACS  - no reports of fevers, chills, sweating, wbc wnl, low c/f infection  - transition to home regiment and monitor for pt tolerating  - monitor pulse oximetry  - incentive spirometry  - hgb drop however this is likely dilutional rather than hemolytic given last discharge hgb was 6.6, pt reports sig decreased PO intake and was dry on exam on admission

## 2018-11-12 NOTE — PROGRESS NOTE ADULT - PROBLEM SELECTOR PLAN 2
- started PO supplementation
- resolved  - continued on 0.45 NS  - encourage oral hydration
- resolved  - continued on 0.45 NS  - encourage oral hydration

## 2018-11-15 LAB
BACTERIA BLD CULT: SIGNIFICANT CHANGE UP
BACTERIA BLD CULT: SIGNIFICANT CHANGE UP

## 2018-12-07 ENCOUNTER — INPATIENT (INPATIENT)
Facility: HOSPITAL | Age: 43
LOS: 2 days | Discharge: HOME | End: 2018-12-10
Attending: HOSPITALIST | Admitting: HOSPITALIST
Payer: MEDICAID

## 2018-12-07 VITALS
TEMPERATURE: 99 F | HEART RATE: 75 BPM | SYSTOLIC BLOOD PRESSURE: 114 MMHG | RESPIRATION RATE: 18 BRPM | OXYGEN SATURATION: 100 % | DIASTOLIC BLOOD PRESSURE: 67 MMHG

## 2018-12-07 DIAGNOSIS — D57.00 HB-SS DISEASE WITH CRISIS, UNSPECIFIED: ICD-10-CM

## 2018-12-07 DIAGNOSIS — Z98.890 OTHER SPECIFIED POSTPROCEDURAL STATES: Chronic | ICD-10-CM

## 2018-12-07 DIAGNOSIS — Z98.891 HISTORY OF UTERINE SCAR FROM PREVIOUS SURGERY: Chronic | ICD-10-CM

## 2018-12-07 LAB
ALBUMIN SERPL ELPH-MCNC: 4.7 G/DL — SIGNIFICANT CHANGE UP (ref 3.3–5)
ALP SERPL-CCNC: 64 U/L — SIGNIFICANT CHANGE UP (ref 40–120)
ALT FLD-CCNC: 9 U/L — SIGNIFICANT CHANGE UP (ref 4–33)
AST SERPL-CCNC: 36 U/L — HIGH (ref 4–32)
B PERT DNA SPEC QL NAA+PROBE: NOT DETECTED — SIGNIFICANT CHANGE UP
BASOPHILS # BLD AUTO: 0.06 K/UL — SIGNIFICANT CHANGE UP (ref 0–0.2)
BASOPHILS NFR BLD AUTO: 1 % — SIGNIFICANT CHANGE UP (ref 0–2)
BILIRUB SERPL-MCNC: 2.6 MG/DL — HIGH (ref 0.2–1.2)
BUN SERPL-MCNC: 10 MG/DL — SIGNIFICANT CHANGE UP (ref 7–23)
C PNEUM DNA SPEC QL NAA+PROBE: NOT DETECTED — SIGNIFICANT CHANGE UP
CALCIUM SERPL-MCNC: 9.2 MG/DL — SIGNIFICANT CHANGE UP (ref 8.4–10.5)
CHLORIDE SERPL-SCNC: 101 MMOL/L — SIGNIFICANT CHANGE UP (ref 98–107)
CO2 SERPL-SCNC: 28 MMOL/L — SIGNIFICANT CHANGE UP (ref 22–31)
CREAT SERPL-MCNC: 1.05 MG/DL — SIGNIFICANT CHANGE UP (ref 0.5–1.3)
EOSINOPHIL # BLD AUTO: 0.42 K/UL — SIGNIFICANT CHANGE UP (ref 0–0.5)
EOSINOPHIL NFR BLD AUTO: 7.2 % — HIGH (ref 0–6)
FLUAV H1 2009 PAND RNA SPEC QL NAA+PROBE: NOT DETECTED — SIGNIFICANT CHANGE UP
FLUAV H1 RNA SPEC QL NAA+PROBE: NOT DETECTED — SIGNIFICANT CHANGE UP
FLUAV H3 RNA SPEC QL NAA+PROBE: NOT DETECTED — SIGNIFICANT CHANGE UP
FLUAV SUBTYP SPEC NAA+PROBE: SIGNIFICANT CHANGE UP
FLUBV RNA SPEC QL NAA+PROBE: NOT DETECTED — SIGNIFICANT CHANGE UP
GLUCOSE SERPL-MCNC: 95 MG/DL — SIGNIFICANT CHANGE UP (ref 70–99)
HADV DNA SPEC QL NAA+PROBE: NOT DETECTED — SIGNIFICANT CHANGE UP
HCG SERPL-ACNC: < 5 MIU/ML — SIGNIFICANT CHANGE UP
HCOV PNL SPEC NAA+PROBE: SIGNIFICANT CHANGE UP
HCT VFR BLD CALC: 24.8 % — LOW (ref 34.5–45)
HGB BLD-MCNC: 8.1 G/DL — LOW (ref 11.5–15.5)
HMPV RNA SPEC QL NAA+PROBE: NOT DETECTED — SIGNIFICANT CHANGE UP
HPIV1 RNA SPEC QL NAA+PROBE: NOT DETECTED — SIGNIFICANT CHANGE UP
HPIV2 RNA SPEC QL NAA+PROBE: NOT DETECTED — SIGNIFICANT CHANGE UP
HPIV3 RNA SPEC QL NAA+PROBE: NOT DETECTED — SIGNIFICANT CHANGE UP
HPIV4 RNA SPEC QL NAA+PROBE: NOT DETECTED — SIGNIFICANT CHANGE UP
IMM GRANULOCYTES # BLD AUTO: 0.01 # — SIGNIFICANT CHANGE UP
IMM GRANULOCYTES NFR BLD AUTO: 0.2 % — SIGNIFICANT CHANGE UP (ref 0–1.5)
LDH SERPL L TO P-CCNC: 370 U/L — HIGH (ref 135–225)
LYMPHOCYTES # BLD AUTO: 3.19 K/UL — SIGNIFICANT CHANGE UP (ref 1–3.3)
LYMPHOCYTES # BLD AUTO: 54.4 % — HIGH (ref 13–44)
MCHC RBC-ENTMCNC: 26.9 PG — LOW (ref 27–34)
MCHC RBC-ENTMCNC: 32.7 % — SIGNIFICANT CHANGE UP (ref 32–36)
MCV RBC AUTO: 82.4 FL — SIGNIFICANT CHANGE UP (ref 80–100)
MONOCYTES # BLD AUTO: 0.63 K/UL — SIGNIFICANT CHANGE UP (ref 0–0.9)
MONOCYTES NFR BLD AUTO: 10.8 % — SIGNIFICANT CHANGE UP (ref 2–14)
NEUTROPHILS # BLD AUTO: 1.55 K/UL — LOW (ref 1.8–7.4)
NEUTROPHILS NFR BLD AUTO: 26.4 % — LOW (ref 43–77)
NRBC # FLD: 0.04 — SIGNIFICANT CHANGE UP
PLATELET # BLD AUTO: 197 K/UL — SIGNIFICANT CHANGE UP (ref 150–400)
PMV BLD: 9.4 FL — SIGNIFICANT CHANGE UP (ref 7–13)
POTASSIUM SERPL-MCNC: 4.4 MMOL/L — SIGNIFICANT CHANGE UP (ref 3.5–5.3)
POTASSIUM SERPL-SCNC: 4.4 MMOL/L — SIGNIFICANT CHANGE UP (ref 3.5–5.3)
PROT SERPL-MCNC: 8.5 G/DL — HIGH (ref 6–8.3)
RBC # BLD: 3.01 M/UL — LOW (ref 3.8–5.2)
RBC # FLD: 13.9 % — SIGNIFICANT CHANGE UP (ref 10.3–14.5)
RETICS #: 67 K/UL — SIGNIFICANT CHANGE UP (ref 25–125)
RETICS/RBC NFR: 2.2 % — SIGNIFICANT CHANGE UP (ref 0.5–2.5)
RSV RNA SPEC QL NAA+PROBE: NOT DETECTED — SIGNIFICANT CHANGE UP
RV+EV RNA SPEC QL NAA+PROBE: NOT DETECTED — SIGNIFICANT CHANGE UP
SODIUM SERPL-SCNC: 139 MMOL/L — SIGNIFICANT CHANGE UP (ref 135–145)
WBC # BLD: 5.86 K/UL — SIGNIFICANT CHANGE UP (ref 3.8–10.5)
WBC # FLD AUTO: 5.86 K/UL — SIGNIFICANT CHANGE UP (ref 3.8–10.5)

## 2018-12-07 PROCEDURE — 71046 X-RAY EXAM CHEST 2 VIEWS: CPT | Mod: 26

## 2018-12-07 RX ORDER — MORPHINE SULFATE 50 MG/1
8 CAPSULE, EXTENDED RELEASE ORAL ONCE
Qty: 0 | Refills: 0 | Status: DISCONTINUED | OUTPATIENT
Start: 2018-12-07 | End: 2018-12-07

## 2018-12-07 RX ORDER — SODIUM CHLORIDE 9 MG/ML
1000 INJECTION INTRAMUSCULAR; INTRAVENOUS; SUBCUTANEOUS ONCE
Qty: 0 | Refills: 0 | Status: COMPLETED | OUTPATIENT
Start: 2018-12-07 | End: 2018-12-07

## 2018-12-07 RX ORDER — DIPHENHYDRAMINE HCL 50 MG
25 CAPSULE ORAL ONCE
Qty: 0 | Refills: 0 | Status: COMPLETED | OUTPATIENT
Start: 2018-12-07 | End: 2018-12-07

## 2018-12-07 RX ORDER — SODIUM CHLORIDE 9 MG/ML
1000 INJECTION, SOLUTION INTRAVENOUS
Qty: 0 | Refills: 0 | Status: DISCONTINUED | OUTPATIENT
Start: 2018-12-07 | End: 2018-12-10

## 2018-12-07 RX ORDER — ACETAMINOPHEN 500 MG
975 TABLET ORAL ONCE
Qty: 0 | Refills: 0 | Status: COMPLETED | OUTPATIENT
Start: 2018-12-07 | End: 2018-12-07

## 2018-12-07 RX ORDER — SODIUM CHLORIDE 9 MG/ML
1000 INJECTION, SOLUTION INTRAVENOUS
Qty: 0 | Refills: 0 | Status: COMPLETED | OUTPATIENT
Start: 2018-12-07 | End: 2018-12-07

## 2018-12-07 RX ORDER — DIPHENHYDRAMINE HCL 50 MG
25 CAPSULE ORAL ONCE
Qty: 0 | Refills: 0 | Status: DISCONTINUED | OUTPATIENT
Start: 2018-12-07 | End: 2018-12-07

## 2018-12-07 RX ADMIN — Medication 975 MILLIGRAM(S): at 21:17

## 2018-12-07 RX ADMIN — Medication 25 MILLIGRAM(S): at 21:15

## 2018-12-07 RX ADMIN — MORPHINE SULFATE 8 MILLIGRAM(S): 50 CAPSULE, EXTENDED RELEASE ORAL at 21:15

## 2018-12-07 RX ADMIN — MORPHINE SULFATE 8 MILLIGRAM(S): 50 CAPSULE, EXTENDED RELEASE ORAL at 20:11

## 2018-12-07 RX ADMIN — MORPHINE SULFATE 8 MILLIGRAM(S): 50 CAPSULE, EXTENDED RELEASE ORAL at 21:49

## 2018-12-07 RX ADMIN — Medication 25 MILLIGRAM(S): at 20:11

## 2018-12-07 RX ADMIN — SODIUM CHLORIDE 200 MILLILITER(S): 9 INJECTION, SOLUTION INTRAVENOUS at 21:16

## 2018-12-07 RX ADMIN — SODIUM CHLORIDE 1000 MILLILITER(S): 9 INJECTION INTRAMUSCULAR; INTRAVENOUS; SUBCUTANEOUS at 20:58

## 2018-12-07 RX ADMIN — MORPHINE SULFATE 8 MILLIGRAM(S): 50 CAPSULE, EXTENDED RELEASE ORAL at 20:58

## 2018-12-07 RX ADMIN — SODIUM CHLORIDE 1000 MILLILITER(S): 9 INJECTION INTRAMUSCULAR; INTRAVENOUS; SUBCUTANEOUS at 20:11

## 2018-12-07 RX ADMIN — Medication 975 MILLIGRAM(S): at 20:11

## 2018-12-07 RX ADMIN — MORPHINE SULFATE 8 MILLIGRAM(S): 50 CAPSULE, EXTENDED RELEASE ORAL at 23:12

## 2018-12-07 NOTE — H&P ADULT - HISTORY OF PRESENT ILLNESS
43 year old female, with past history significant for Hb-SS disease with crisis, Acute chest syndrome and Smoking, presented to the ED secondary to worsening of chest pain, and joint pain.  Seen and evaluated at bedside;    Vital signs upon ED presentation as follows: BP = 114/67, HR = 75, RR = 18, T = 37 C (98.6 F), O2 Sat = 100% on RA.  Diagnosed with Sickle Cell Crisis.  Prescribed morphine 8 mg IV x 3, benadryl 25 mg IV x 2, Tylenol 975 mg PO x one, NaCl 2 liters with subsequent IVF of 0.45 NS at 100 mL/Hr x one day in the ED. 43 year old female, with past history significant for Hb-SS disease with crisis, Acute chest syndrome and Smoking, presented to the ED secondary to worsening of chest pain, and joint pain.  Seen and evaluated at bedside; appears to be experiencing painful discomfort.  Per reports, pain has been worsening for the past approximately 4 days, and was not ameliorated with home medications of fentanyl transdermal, MS Contin, or IR morphine.  Chest pain is chiefly concentrated over the sternum.  Had some coughing and sore throat; on Z-olive, as prescribed by PMD, for the past 4 days.  No reports of fevers, chills, nausea, vomiting, headaches, shortness of breath, palpitations, diarrhea, constipation, dysuria.    Vital signs upon ED presentation as follows: BP = 114/67, HR = 75, RR = 18, T = 37 C (98.6 F), O2 Sat = 100% on RA.  Diagnosed with Sickle Cell Crisis.  Prescribed morphine 8 mg IV x 3, benadryl 25 mg IV x 2, Tylenol 975 mg PO x one, NaCl 2 liters with subsequent IVF of 0.45 NS at 100 mL/Hr x one day in the ED. 43 year old female, with past history significant for Hb-SS disease with crisis, Acute chest syndrome and Smoking, presented to the ED secondary to worsening of chest pain, and joint pain.  Seen and evaluated at bedside; appears to be experiencing painful discomfort.  Per reports, pain (sternum, elbows, back, knees) has been worsening for the past approximately 4 days, and was not ameliorated with home medications of fentanyl transdermal, MS Contin, or IR morphine.  Chest pain is chiefly concentrated over the sternum.  Had some coughing and sore throat; on Z-olive, as prescribed by PMD, for the past 4 days.  No reports of fevers, chills, nausea, vomiting, headaches, shortness of breath, palpitations, diarrhea, constipation, dysuria.    Vital signs upon ED presentation as follows: BP = 114/67, HR = 75, RR = 18, T = 37 C (98.6 F), O2 Sat = 100% on RA.  Diagnosed with Sickle Cell Crisis.  Prescribed morphine 8 mg IV x 3, benadryl 25 mg IV x 2, Tylenol 975 mg PO x one, NaCl 2 liters with subsequent IVF of 0.45 NS at 100 mL/Hr x one day in the ED. 43 year old female, with past history significant for Hb-SS disease with crisis, Acute chest syndrome and Smoking, presented to the ED secondary to worsening of chest pain, and joint pain.  Seen and evaluated at bedside; appears to be experiencing painful discomfort.  Per reports, pain (sternum, elbows, knees) has been worsening for the past approximately 4 days, and was not ameliorated with home medications of fentanyl transdermal, MS Contin, or IR morphine.  Chest pain is chiefly concentrated over the sternum.  Had some coughing and sore throat; on Z-olive, as prescribed by PMD, for the past 4 days.  Sick contacts are children at home with repeated flu-like symptoms.  Reports dry cough - occasionally.  No reports of fevers, chills, nausea, vomiting, headaches, shortness of breath, palpitations, diarrhea, constipation, dysuria.    Vital signs upon ED presentation as follows: BP = 114/67, HR = 75, RR = 18, T = 37 C (98.6 F), O2 Sat = 100% on RA.  Diagnosed with Sickle Cell Crisis.  Prescribed morphine 8 mg IV x 3, benadryl 25 mg IV x 2, Tylenol 975 mg PO x one, NaCl 2 liters with subsequent IVF of 0.45 NS at 100 mL/Hr x one day in the ED.

## 2018-12-07 NOTE — ED SUB INTERN NOTE - OBJECTIVE STATEMENT FT
42 y/o F with PMHx of Sickle cell presenting with HA, Chest pain, Pain in elbows and joints unrelieved by Morphine IR 60mg and MS Contin 60 mg, Fentanyl patch 50mcg also placed yesterday. Meds: Hydroxyurea, folic acid and on Z-pack for 4 days by PMD for possible infection. Sick contact: children have URI. 44 y/o F with Hx of Sickle cell and chest syndrome presenting with HA, Chest pain, Pain in elbows and joints unrelieved by Morphine IR 60mg and MS Contin 60 mg, Fentanyl patch 50mcg also placed yesterday. Denies nausea, vomiting, fever, chills, Meds: Hydroxyurea, folic acid and on Z-pack for 4 days by PMD for possible infection. Sick contact: children have URI.

## 2018-12-07 NOTE — ED ADULT NURSE NOTE - CHIEF COMPLAINT QUOTE
Pt c/o headache/sore throat and chest tightness with slight  cough x4 days--pt states she is having a sickle cell crisis and her chest is very painful

## 2018-12-07 NOTE — H&P ADULT - PROBLEM SELECTOR PLAN 2
- does not appear to be of cardiac etiology or ACS  - no ECG abnormality (see above)  - not hypoxic, CXR clear  - continues on morphine PCA  - follow pulse oximetry  - incentive spirometry

## 2018-12-07 NOTE — ED PROVIDER NOTE - MEDICAL DECISION MAKING DETAILS
Nasir Rae (Resident): 42 y/o sickle cell presents with pain crisis - no cough, SOB but hx of acute chest - looks uncomfortable despite aggressive pain regiment at home - will give IV Pain control, labs, CXR to r/o acute chest and dispo accordingly.

## 2018-12-07 NOTE — ED SUB INTERN NOTE - FAMILY HISTORY
Father  Still living? Unknown  Family history of sickle cell trait, Age at diagnosis: Age Unknown     Mother  Still living? Unknown  Family history of sickle cell trait, Age at diagnosis: Age Unknown     Child  Still living? Unknown  Family history of sickle cell trait, Age at diagnosis: Age Unknown     Child  Still living? Unknown  Family history of sickle cell trait, Age at diagnosis: Age Unknown

## 2018-12-07 NOTE — ED PROVIDER NOTE - MUSCULOSKELETAL, MLM
Strength appropriate for age. Full active range of motion of all 4 extremities. No joint swelling, calor, or deformities.

## 2018-12-07 NOTE — H&P ADULT - PROBLEM SELECTOR PLAN 1
- worsening pains x 4 days; possibly triggered by viral illness, dehydration, cold weather  - not relieved with home medications (fentanyl transdermal, morphine IR and ER  - s/[p morphine 8 mg IV x 3 in the ED  - morphine PCA prescribed  - continues on hydroxyurea 1 gram BID, folic acid  - hematology consult in the AM (please call)  - incentive spirometry  - f/u AM lab-work - worsening pains x 4 days; possibly triggered by viral illness, dehydration, cold weather  - not relieved with home medications (fentanyl transdermal, morphine IR and ER  - s/[p morphine 8 mg IV x 3 in the ED  - morphine PCA prescribed  - continues on hydroxyurea 1 gram BID, folic acid, MS contin, fentanyl transdermal  - hematology consult in the AM (please call)  - incentive spirometry  - IVF hydration  - antitussive PRN  - f/u AM lab-work

## 2018-12-07 NOTE — H&P ADULT - PROBLEM SELECTOR PLAN 3
- BUN/Cr = 10/1.05 (previously 9/0.84)  - clinically appears dehydrated  - s/p 2 liters NS in the ED and continued on maintenance IVF of 0.45 NS at 100 mL/Hr x 24 hours  - encourage oral fluid intake  - f/u for improvement

## 2018-12-07 NOTE — ED SUB INTERN NOTE - MEDICAL DECISION MAKING DETAILS
44 y/o F with head, chest and elbow and knee pain with generalized weakness with concern for sickle cell crisis will get chest xray, cbc, cmp, lactate, give IVF, place on O2, IV Morphine and Benadryl PO

## 2018-12-07 NOTE — ED PROVIDER NOTE - OBJECTIVE STATEMENT
44 y/o female hx of Sickle Cell Disease p/w chest pain and joint pain. 44 y/o female hx of Sickle Cell Disease, hx of acute chest per documentation p/w chest pain and joint pain. States pain started 4 days ago, acutely worse today. Takes MS Contin 60mg/day, Fentanyl Patch 50mg daily, and then took 60mg Morphine IR today for breakthrough pain at 4 pm. States no SOB or diff breathing now but pain is severe so had to come to ED. No recent fevers, N/V/D, numbness, weakness.

## 2018-12-07 NOTE — H&P ADULT - ASSESSMENT
43 year old female, with past history significant for Hb-SS disease with crisis, Acute chest syndrome and Smoking, presented to the ED secondary to worsening of chest pain, and joint pain.  Vital signs upon ED presentation as follows: BP = 114/67, HR = 75, RR = 18, T = 37 C (98.6 F), O2 Sat = 100% on RA.  Diagnosed with Sickle Cell Crisis.

## 2018-12-07 NOTE — ED ADULT NURSE NOTE - NSIMPLEMENTINTERV_GEN_ALL_ED
Implemented All Universal Safety Interventions:  [place] to call system. Call bell, personal items and telephone within reach. Instruct patient to call for assistance. Room bathroom lighting operational. Non-slip footwear when patient is off stretcher. Physically safe environment: no spills, clutter or unnecessary equipment. Stretcher in lowest position, wheels locked, appropriate side rails in place.

## 2018-12-07 NOTE — ED PROVIDER NOTE - ATTENDING CONTRIBUTION TO CARE
DR. BLOCH, ATTENDING MD-  I performed a face to face bedside interview with patient regarding history of present illness, review of symptoms and past medical history. I completed an independent physical exam.  I have discussed patient's plan of care with the resident.   Patient well appearing mild distress from pain, not hypoxic, not SOB, HEENT pale conjunctiva, heart sounds nml lungs clear, abd soft nontender, ext no swelling or point tenderness. skin nml. neuero nonfocal.

## 2018-12-07 NOTE — H&P ADULT - FAMILY HISTORY
Child  Still living? Unknown  Family history of sickle cell trait, Age at diagnosis: Age Unknown     Child  Still living? Unknown  Family history of sickle cell trait, Age at diagnosis: Age Unknown

## 2018-12-07 NOTE — ED ADULT NURSE NOTE - OBJECTIVE STATEMENT
received pt in bed A and O x 3 in NAD, with right AC IV in place. pt reports " I am here because of my sickle cell crisis, my joints hurts and my head' denies c/p, SOB. orders noted and completed.

## 2018-12-07 NOTE — H&P ADULT - NSHPLABSRESULTS_GEN_ALL_CORE
8.1    5.86  )-----------( 197      ( 07 Dec 2018 20:00 )             24.8       12-07    139  |  101  |  10  ----------------------------<  95  4.4   |  28  |  1.05    Ca    9.2      07 Dec 2018 20:00    TPro  8.5<H>  /  Alb  4.7  /  TBili  2.6<H>  /  DBili  x   /  AST  36<H>  /  ALT  9   /  AlkPhos  64  12-07      Lactate Trend      CAPILLARY BLOOD GLUCOSE 8.1    5.86  )-----------( 197      ( 07 Dec 2018 20:00 )             24.8       12-07    139  |  101  |  10  ----------------------------<  95  4.4   |  28  |  1.05    Ca    9.2      07 Dec 2018 20:00    TPro  8.5<H>  /  Alb  4.7  /  TBili  2.6<H>  /  DBili  x   /  AST  36<H>  /  ALT  9   /  AlkPhos  64  12-07      Lactate Trend      CAPILLARY BLOOD GLUCOSE    ============================================  ECG = NSR at 72 bpm, QTc = 407

## 2018-12-07 NOTE — ED ADULT TRIAGE NOTE - CHIEF COMPLAINT QUOTE
Pt c/o headache/sore throat and chest tightness with cough x4 days--pt states she was here last month with pneumonia Pt c/o headache/sore throat and chest tightness with cough x4 days--pt states she is having a sickle cell crisis and her chest is very painful Pt c/o headache/sore throat and chest tightness with slight  cough x4 days--pt states she is having a sickle cell crisis and her chest is very painful

## 2018-12-08 ENCOUNTER — TRANSCRIPTION ENCOUNTER (OUTPATIENT)
Age: 43
End: 2018-12-08

## 2018-12-08 DIAGNOSIS — Z29.9 ENCOUNTER FOR PROPHYLACTIC MEASURES, UNSPECIFIED: ICD-10-CM

## 2018-12-08 DIAGNOSIS — E86.0 DEHYDRATION: ICD-10-CM

## 2018-12-08 DIAGNOSIS — R07.9 CHEST PAIN, UNSPECIFIED: ICD-10-CM

## 2018-12-08 DIAGNOSIS — D57.00 HB-SS DISEASE WITH CRISIS, UNSPECIFIED: ICD-10-CM

## 2018-12-08 DIAGNOSIS — E55.9 VITAMIN D DEFICIENCY, UNSPECIFIED: ICD-10-CM

## 2018-12-08 PROBLEM — D57.01 HB-SS DISEASE WITH ACUTE CHEST SYNDROME: Chronic | Status: ACTIVE | Noted: 2018-11-09

## 2018-12-08 LAB
CHOLEST SERPL-MCNC: 106 MG/DL — LOW (ref 120–199)
HAPTOGLOB SERPL-MCNC: < 20 MG/DL — LOW (ref 34–200)
HDLC SERPL-MCNC: 54 MG/DL — SIGNIFICANT CHANGE UP (ref 45–65)
LDH SERPL L TO P-CCNC: 298 U/L — HIGH (ref 135–225)
LIPID PNL WITH DIRECT LDL SERPL: 48 MG/DL — SIGNIFICANT CHANGE UP
RETICS #: 65 K/UL — SIGNIFICANT CHANGE UP (ref 25–125)
RETICS/RBC NFR: 2.6 % — HIGH (ref 0.5–2.5)
SPECIMEN SOURCE: SIGNIFICANT CHANGE UP
SPECIMEN SOURCE: SIGNIFICANT CHANGE UP
TRIGL SERPL-MCNC: 37 MG/DL — SIGNIFICANT CHANGE UP (ref 10–149)

## 2018-12-08 PROCEDURE — 12345: CPT | Mod: NC,GC

## 2018-12-08 PROCEDURE — 99223 1ST HOSP IP/OBS HIGH 75: CPT

## 2018-12-08 RX ORDER — INFLUENZA VIRUS VACCINE 15; 15; 15; 15 UG/.5ML; UG/.5ML; UG/.5ML; UG/.5ML
0.5 SUSPENSION INTRAMUSCULAR ONCE
Qty: 0 | Refills: 0 | Status: DISCONTINUED | OUTPATIENT
Start: 2018-12-08 | End: 2018-12-10

## 2018-12-08 RX ORDER — SENNA PLUS 8.6 MG/1
2 TABLET ORAL AT BEDTIME
Qty: 0 | Refills: 0 | Status: DISCONTINUED | OUTPATIENT
Start: 2018-12-08 | End: 2018-12-10

## 2018-12-08 RX ORDER — ENOXAPARIN SODIUM 100 MG/ML
40 INJECTION SUBCUTANEOUS EVERY 24 HOURS
Qty: 0 | Refills: 0 | Status: DISCONTINUED | OUTPATIENT
Start: 2018-12-08 | End: 2018-12-10

## 2018-12-08 RX ORDER — NALOXONE HYDROCHLORIDE 4 MG/.1ML
0.1 SPRAY NASAL
Qty: 0 | Refills: 0 | Status: DISCONTINUED | OUTPATIENT
Start: 2018-12-08 | End: 2018-12-10

## 2018-12-08 RX ORDER — DOCUSATE SODIUM 100 MG
100 CAPSULE ORAL
Qty: 0 | Refills: 0 | Status: DISCONTINUED | OUTPATIENT
Start: 2018-12-08 | End: 2018-12-10

## 2018-12-08 RX ORDER — FOLIC ACID 0.8 MG
1 TABLET ORAL DAILY
Qty: 0 | Refills: 0 | Status: DISCONTINUED | OUTPATIENT
Start: 2018-12-08 | End: 2018-12-10

## 2018-12-08 RX ORDER — MORPHINE SULFATE 50 MG/1
30 CAPSULE, EXTENDED RELEASE ORAL
Qty: 0 | Refills: 0 | Status: DISCONTINUED | OUTPATIENT
Start: 2018-12-08 | End: 2018-12-10

## 2018-12-08 RX ORDER — MORPHINE SULFATE 50 MG/1
60 CAPSULE, EXTENDED RELEASE ORAL EVERY 12 HOURS
Qty: 0 | Refills: 0 | Status: DISCONTINUED | OUTPATIENT
Start: 2018-12-08 | End: 2018-12-10

## 2018-12-08 RX ORDER — ONDANSETRON 8 MG/1
4 TABLET, FILM COATED ORAL EVERY 6 HOURS
Qty: 0 | Refills: 0 | Status: DISCONTINUED | OUTPATIENT
Start: 2018-12-08 | End: 2018-12-10

## 2018-12-08 RX ORDER — DIPHENHYDRAMINE HCL 50 MG
25 CAPSULE ORAL EVERY 4 HOURS
Qty: 0 | Refills: 0 | Status: DISCONTINUED | OUTPATIENT
Start: 2018-12-08 | End: 2018-12-10

## 2018-12-08 RX ORDER — MORPHINE SULFATE 50 MG/1
2 CAPSULE, EXTENDED RELEASE ORAL ONCE
Qty: 0 | Refills: 0 | Status: DISCONTINUED | OUTPATIENT
Start: 2018-12-08 | End: 2018-12-08

## 2018-12-08 RX ORDER — FENTANYL CITRATE 50 UG/ML
1 INJECTION INTRAVENOUS
Qty: 0 | Refills: 0 | Status: DISCONTINUED | OUTPATIENT
Start: 2018-12-09 | End: 2018-12-10

## 2018-12-08 RX ORDER — CHOLECALCIFEROL (VITAMIN D3) 125 MCG
1000 CAPSULE ORAL DAILY
Qty: 0 | Refills: 0 | Status: DISCONTINUED | OUTPATIENT
Start: 2018-12-08 | End: 2018-12-10

## 2018-12-08 RX ORDER — HYDROXYZINE HCL 10 MG
25 TABLET ORAL ONCE
Qty: 0 | Refills: 0 | Status: COMPLETED | OUTPATIENT
Start: 2018-12-08 | End: 2018-12-08

## 2018-12-08 RX ORDER — HYDROXYUREA 500 MG/1
1000 CAPSULE ORAL
Qty: 0 | Refills: 0 | Status: DISCONTINUED | OUTPATIENT
Start: 2018-12-08 | End: 2018-12-10

## 2018-12-08 RX ADMIN — Medication 100 MILLIGRAM(S): at 07:06

## 2018-12-08 RX ADMIN — ENOXAPARIN SODIUM 40 MILLIGRAM(S): 100 INJECTION SUBCUTANEOUS at 07:03

## 2018-12-08 RX ADMIN — HYDROXYUREA 1000 MILLIGRAM(S): 500 CAPSULE ORAL at 07:04

## 2018-12-08 RX ADMIN — Medication 1 MILLIGRAM(S): at 13:21

## 2018-12-08 RX ADMIN — MORPHINE SULFATE 60 MILLIGRAM(S): 50 CAPSULE, EXTENDED RELEASE ORAL at 18:03

## 2018-12-08 RX ADMIN — MORPHINE SULFATE 60 MILLIGRAM(S): 50 CAPSULE, EXTENDED RELEASE ORAL at 07:03

## 2018-12-08 RX ADMIN — Medication 100 MILLIGRAM(S): at 18:04

## 2018-12-08 RX ADMIN — Medication 25 MILLIGRAM(S): at 18:03

## 2018-12-08 RX ADMIN — HYDROXYUREA 1000 MILLIGRAM(S): 500 CAPSULE ORAL at 18:04

## 2018-12-08 RX ADMIN — SODIUM CHLORIDE 100 MILLILITER(S): 9 INJECTION, SOLUTION INTRAVENOUS at 02:22

## 2018-12-08 RX ADMIN — SODIUM CHLORIDE 100 MILLILITER(S): 9 INJECTION, SOLUTION INTRAVENOUS at 13:21

## 2018-12-08 RX ADMIN — MORPHINE SULFATE 30 MILLILITER(S): 50 CAPSULE, EXTENDED RELEASE ORAL at 07:02

## 2018-12-08 RX ADMIN — Medication 25 MILLIGRAM(S): at 23:18

## 2018-12-08 RX ADMIN — MORPHINE SULFATE 30 MILLILITER(S): 50 CAPSULE, EXTENDED RELEASE ORAL at 02:04

## 2018-12-08 RX ADMIN — MORPHINE SULFATE 2 MILLIGRAM(S): 50 CAPSULE, EXTENDED RELEASE ORAL at 19:30

## 2018-12-08 RX ADMIN — MORPHINE SULFATE 30 MILLILITER(S): 50 CAPSULE, EXTENDED RELEASE ORAL at 20:41

## 2018-12-08 RX ADMIN — MORPHINE SULFATE 2 MILLIGRAM(S): 50 CAPSULE, EXTENDED RELEASE ORAL at 19:12

## 2018-12-08 RX ADMIN — Medication 25 MILLIGRAM(S): at 07:27

## 2018-12-08 RX ADMIN — Medication 1000 UNIT(S): at 13:21

## 2018-12-08 NOTE — DISCHARGE NOTE ADULT - PLAN OF CARE
resolved MANUELA was unremarkable, CXR clear symptoms control Continue hydroxyurea 1 gram BID, folic acid, MS contin and fentanyl transdermal. Avoid exposure to heat/cold temperatures, avoid dehydration  Call to make an appt with Berenice Traore: Phone: [phone] [address] [address] [place] [address]. Level >30 Continue daily Vitamin D MANUELA was unremarkable, CXR clear. Please follow up with your with PCP within 1 week. - ECG was unremarkable, CXR with clear lungs  - Please follow up with your with PCP within 1 week for further management - Continue Hydroxyurea 1 gram BID, Folic acid, MS contin and fentanyl transdermal. Avoid exposure to heat/cold temperatures, avoid dehydration  - Call to make an appt with Berenice Traore Phone: [phone] [address] [place] [id number] - Continue daily Vitamin D supplementation - Continue Hydroxyurea 1 gram BID, Folic acid, MS contin and fentanyl transdermal. Avoid exposure to heat/cold temperatures, avoid dehydration  - Call to make an appt with Berneice Traore Phone: [phone] [address] [place] [address] supplement followup You were noted to have a bacteria in your blood stream but after reviewing the case with the infectious disease team, this was not felt to be clinically significant, and you do not need to be on continued antibiotics. Please followup with your doctor after discharge and seek medical attention if your are having fever or worsening pain

## 2018-12-08 NOTE — DISCHARGE NOTE ADULT - ADDITIONAL INSTRUCTIONS
Call to make an appt with Berenice Traore Phone: [phone] [address] [place], [place] [id number]

## 2018-12-08 NOTE — DISCHARGE NOTE ADULT - PATIENT PORTAL LINK FT
You can access the [facility] Patient Portal, offered by [facility], by registering with the following website: [email or web address]

## 2018-12-08 NOTE — DISCHARGE NOTE ADULT - MEDICATION SUMMARY - MEDICATIONS TO TAKE
I will START or STAY ON the medications listed below when I get home from the hospital:    morphine 60 mg/12 hours oral tablet, extended release  -- 1 tab(s) by mouth every 12 hours MDD:2tabs /day  -- Indication: For Sickle cell crisis    Morphine IR 30 mg oral tablet  -- 1 tab(s) by mouth every 6 hours, As Needed MDD:4tabs /day  -- Indication: For Sickle cell crisis    fentaNYL 50 mcg/hr transdermal film, extended release  -- 1 film(s) by transdermal patch every 72 hours - (last placed 11/06/2018) MDD:1 patch every 72hrs  -- Indication: For Sickle cell crisis    hydroxyurea 500 mg oral capsule  -- 2 cap(s) by mouth 2 times a day  -- Indication: For Hb-SS disease with crisis    guaiFENesin 100 mg/5 mL oral liquid  -- 5 milliliter(s) by mouth every 6 hours, As needed, Cough  -- Indication: For Cough    docusate sodium 100 mg oral capsule  -- 1 cap(s) by mouth 2 times a day  -- Indication: For Stool softner    senna oral tablet  -- 2 tab(s) by mouth once a day (at bedtime), As Needed   -- Indication: For Constipation    folic acid 1 mg oral tablet  -- 1 tab(s) by mouth once a day  -- Indication: For Supplement    cholecalciferol oral tablet  -- 1000 unit(s) by mouth once a day  -- Indication: For Vitamin D deficiency I will START or STAY ON the medications listed below when I get home from the hospital:    fentaNYL 50 mcg/hr transdermal film, extended release  -- 1 film(s) by transdermal patch every 72 hours - (last placed 11/06/2018) MDD:1 patch every 72hrs  -- Indication: For Sickle cell crisis    morphine 60 mg/12 to 24 hr oral capsule, extended release  -- 1 cap(s) by mouth every 12 hours MDD:2 tabs/ day  -- Indication: For Sickle cell crisis    morphine 30 mg oral tablet  -- 1 tab(s) by mouth every 6 hours, As Needed MDD:4 tabs / day  -- Caution federal law prohibits the transfer of this drug to any person other  than the person for whom it was prescribed.  It is very important that you take or use this exactly as directed.  Do not skip doses or discontinue unless directed by your doctor.  May cause drowsiness.  Alcohol may intensify this effect.  Use care when operating dangerous machinery.  This prescription cannot be refilled.  Using more of this medication than prescribed may cause serious breathing problems.    -- Indication: For Sickle cell crisis    hydroxyurea 500 mg oral capsule  -- 2 cap(s) by mouth 2 times a day  -- Indication: For Hb-SS disease with crisis    guaiFENesin 100 mg/5 mL oral liquid  -- 5 milliliter(s) by mouth every 6 hours, As needed, Cough  -- Indication: For Cough    docusate sodium 100 mg oral capsule  -- 1 cap(s) by mouth 2 times a day  -- Indication: For Stool softner    senna oral tablet  -- 2 tab(s) by mouth once a day (at bedtime), As Needed   -- Indication: For Constipation    folic acid 1 mg oral tablet  -- 1 tab(s) by mouth once a day  -- Indication: For Supplement    cholecalciferol oral tablet  -- 1000 unit(s) by mouth once a day  -- Indication: For Vitamin D deficiency

## 2018-12-08 NOTE — DISCHARGE NOTE ADULT - CARE PLAN
Principal Discharge DX:	Acute chest syndrome  Goal:	resolved  Assessment and plan of treatment:	MANUELA was unremarkable, CXR clear  Secondary Diagnosis:	Hb-SS disease with crisis  Goal:	symptoms control  Assessment and plan of treatment:	Continue hydroxyurea 1 gram BID, folic acid, MS contin and fentanyl transdermal. Avoid exposure to heat/cold temperatures, avoid dehydration  Call to make an appt with Berenice Traore: Phone: [phone] [address] [address] [place] [address].  Secondary Diagnosis:	Vitamin D deficiency  Goal:	Level >30  Assessment and plan of treatment:	Continue daily Vitamin D Principal Discharge DX:	Acute chest syndrome  Goal:	resolved  Assessment and plan of treatment:	MANUELA was unremarkable, CXR clear. Please follow up with your with PCP within 1 week.  Secondary Diagnosis:	Hb-SS disease with crisis  Goal:	symptoms control  Assessment and plan of treatment:	Continue hydroxyurea 1 gram BID, folic acid, MS contin and fentanyl transdermal. Avoid exposure to heat/cold temperatures, avoid dehydration  Call to make an appt with Berenice Traore: Phone: [phone] [address] [place] [address].  Secondary Diagnosis:	Vitamin D deficiency  Goal:	Level >30  Assessment and plan of treatment:	Continue daily Vitamin D Principal Discharge DX:	Acute chest syndrome  Goal:	resolved  Assessment and plan of treatment:	- ECG was unremarkable, CXR with clear lungs  - Please follow up with your with PCP within 1 week for further management  Secondary Diagnosis:	Hb-SS disease with crisis  Goal:	symptoms control  Assessment and plan of treatment:	- Continue Hydroxyurea 1 gram BID, Folic acid, MS contin and fentanyl transdermal. Avoid exposure to heat/cold temperatures, avoid dehydration  - Call to make an appt with Berenice Traore Phone: [phone] [address] [address] [place] [id number]  Secondary Diagnosis:	Vitamin D deficiency  Goal:	Level >30  Assessment and plan of treatment:	- Continue daily Vitamin D supplementation Principal Discharge DX:	Hb-SS disease with crisis  Goal:	resolved  Assessment and plan of treatment:	- Continue Hydroxyurea 1 gram BID, Folic acid, MS contin and fentanyl transdermal. Avoid exposure to heat/cold temperatures, avoid dehydration  - Call to make an appt with Berenice Traore Phone: [phone] [address] [place] [id number]  Secondary Diagnosis:	Vitamin D deficiency  Goal:	supplement  Assessment and plan of treatment:	- Continue daily Vitamin D supplementation  Secondary Diagnosis:	Bacteremia  Goal:	followup  Assessment and plan of treatment:	You were noted to have a bacteria in your blood stream but after reviewing the case with the infectious disease team, this was not felt to be clinically significant, and you do not need to be on continued antibiotics. Please followup with your doctor after discharge and seek medical attention if your are having fever or worsening pain

## 2018-12-08 NOTE — DISCHARGE NOTE ADULT - HOSPITAL COURSE
43 year old female, with past history significant for Hb-SS disease with crisis, Acute chest syndrome and Smoking, presented to the ED secondary to worsening of chest pain, and joint pain. CXR negative, ECG unremarkable. Treated with IV PCA pump, incentive spirometer, pain improved. Pt is optimized for discharge with outpt follow up 43 year old female, with past history significant for Hb-SS disease with crisis, Acute chest syndrome and Smoking, presented to the ED secondary to worsening of chest pain, and joint pain. CXR negative, ECG unremarkable. Treated with IV PCA pump, incentive spirometer, pain improved. 12'/7 BCx- GPC, f/u repeat BCx 12/9- NTD. Pt is optimized for discharge with outpt follow up 43 year old female, with Hb-SS disease with crisis, c/b Acute chest syndrome in past, and Smoking, presented to the ED secondary to worsening of chest pain, and joint pain. CXR negative, ECG unremarkable. Admitted with sickle cell crisis. Pain managed with IV PCA pump, received IV fluids, folic acid/hydroxyurea. Pain resolved, patient felt ready to go home. No e/o acute chest. To followup with otpt heme/onc. Noted to have vitamin d deficiency, started on supplementation. Bcx from 12/7 grew mirrococcus luteus in 1/4 bottles, repeat Bcx NG, case discussed with infectious disease, not felt to be clinically significant, antibiotics discontinued, Patient medically stable for discharge home.

## 2018-12-08 NOTE — PATIENT PROFILE ADULT - NSPROMUTINFOINDIVIDFT_GEN_A_NUR
Pt will report relieve/controlled pain throughout the shift as evidence by relaxed posture and facial expression and being able to sleep and rest appropriately.

## 2018-12-08 NOTE — DISCHARGE NOTE ADULT - CARE PROVIDER_API CALL
Berenice Johnson), Internal Medicine  [id number] [address] [place], [place] [id number]  Phone: [phone]  Fax: [phone]

## 2018-12-08 NOTE — PATIENT PROFILE ADULT - NSPROMUTPARTICIPCAREFT_GEN_A_NUR
RN will continue to educate family and pt on medications & procedures. Pt wants to be involved in care.

## 2018-12-08 NOTE — PROGRESS NOTE ADULT - SUBJECTIVE AND OBJECTIVE BOX
Patient is a 43y old  Female who presents with a chief complaint of Sickle cell crisis (08 Dec 2018 11:31)      SUBJECTIVE / OVERNIGHT EVENTS: Afebrile, Pain improved on PCA pump. Denied CP, no SOB.     MEDICATIONS  (STANDING):  cholecalciferol 1000 Unit(s) Oral daily  docusate sodium 100 milliGRAM(s) Oral two times a day  enoxaparin Injectable 40 milliGRAM(s) SubCutaneous every 24 hours  folic acid 1 milliGRAM(s) Oral daily  hydroxyurea 1000 milliGRAM(s) Oral two times a day  influenza   Vaccine 0.5 milliLiter(s) IntraMuscular once  morphine ER Tablet 60 milliGRAM(s) Oral every 12 hours  morphine PCA (5 mG/mL) 30 milliLiter(s) PCA Continuous PCA Continuous  sodium chloride 0.45%. 1000 milliLiter(s) (100 mL/Hr) IV Continuous <Continuous>    MEDICATIONS  (PRN):  diphenhydrAMINE 25 milliGRAM(s) Oral every 4 hours PRN Rash and/or Itching  guaiFENesin   Syrup  (Sugar-Free) 100 milliGRAM(s) Oral every 6 hours PRN Cough  naloxone Injectable 0.1 milliGRAM(s) IV Push every 3 minutes PRN For ANY of the following changes in patient status:  A. RR LESS THAN 10 breaths per minute, B. Oxygen saturation LESS THAN 90%, C. Sedation score of 6  ondansetron Injectable 4 milliGRAM(s) IV Push every 6 hours PRN Nausea  senna 2 Tablet(s) Oral at bedtime PRN Constipation      Vital Signs Last 24 Hrs  T(C): 36.8 (08 Dec 2018 06:49), Max: 37.4 (07 Dec 2018 20:53)  T(F): 98.3 (08 Dec 2018 06:49), Max: 99.4 (07 Dec 2018 20:53)  HR: 70 (08 Dec 2018 06:49) (70 - 80)  BP: 98/49 (08 Dec 2018 06:49) (98/49 - 114/67)  BP(mean): --  RR: 17 (08 Dec 2018 06:49) (16 - 18)  SpO2: 100% (08 Dec 2018 06:49) (100% - 100%)  CAPILLARY BLOOD GLUCOSE        I&O's Summary      PHYSICAL EXAM:  GENERAL: NAD,   HEAD:  Atraumatic, Normocephalic  EYES: EOMI, PERRLA, conjunctiva and sclera clear  NECK: Supple, No JVD  CHEST/LUNG: Clear to auscultation bilaterally; No wheeze  HEART: Regular rate and rhythm; No murmurs, rubs, or gallops  ABDOMEN: Soft, Nontender, Nondistended; Bowel sounds present  EXTREMITIES:  2+ Peripheral Pulses, No clubbing, cyanosis, or edema  PSYCH: AAOx3  NEUROLOGY: non-focal  SKIN: No rashes or lesions    LABS:                        8.1    5.86  )-----------( 197      ( 07 Dec 2018 20:00 )             24.8     12-07    139  |  101  |  10  ----------------------------<  95  4.4   |  28  |  1.05    Ca    9.2      07 Dec 2018 20:00    TPro  8.5<H>  /  Alb  4.7  /  TBili  2.6<H>  /  DBili  x   /  AST  36<H>  /  ALT  9   /  AlkPhos  64  12-07                  RADIOLOGY & ADDITIONAL TESTS:    Imaging Personally Reviewed:  < from: Xray Chest 2 Views PA/Lat (12.07.18 @ 21:42) >  IMPRESSION: Normal chest    < end of copied text >      Consultant(s) Notes Reviewed:      Care Discussed with Consultants/Other Providers:

## 2018-12-09 DIAGNOSIS — R78.81 BACTEREMIA: ICD-10-CM

## 2018-12-09 LAB
METHOD TYPE: SIGNIFICANT CHANGE UP
ORGANISM # SPEC MICROSCOPIC CNT: SIGNIFICANT CHANGE UP
ORGANISM # SPEC MICROSCOPIC CNT: SIGNIFICANT CHANGE UP

## 2018-12-09 PROCEDURE — 99233 SBSQ HOSP IP/OBS HIGH 50: CPT | Mod: GC

## 2018-12-09 RX ORDER — VANCOMYCIN HCL 1 G
1000 VIAL (EA) INTRAVENOUS EVERY 12 HOURS
Qty: 0 | Refills: 0 | Status: DISCONTINUED | OUTPATIENT
Start: 2018-12-09 | End: 2018-12-10

## 2018-12-09 RX ORDER — DIPHENHYDRAMINE HCL 50 MG
50 CAPSULE ORAL ONCE
Qty: 0 | Refills: 0 | Status: DISCONTINUED | OUTPATIENT
Start: 2018-12-09 | End: 2018-12-09

## 2018-12-09 RX ORDER — DIPHENHYDRAMINE HCL 50 MG
25 CAPSULE ORAL ONCE
Qty: 0 | Refills: 0 | Status: COMPLETED | OUTPATIENT
Start: 2018-12-09 | End: 2018-12-09

## 2018-12-09 RX ORDER — PIPERACILLIN AND TAZOBACTAM 4; .5 G/20ML; G/20ML
3.38 INJECTION, POWDER, LYOPHILIZED, FOR SOLUTION INTRAVENOUS ONCE
Qty: 0 | Refills: 0 | Status: COMPLETED | OUTPATIENT
Start: 2018-12-09 | End: 2018-12-09

## 2018-12-09 RX ORDER — VANCOMYCIN HCL 1 G
1000 VIAL (EA) INTRAVENOUS ONCE
Qty: 0 | Refills: 0 | Status: COMPLETED | OUTPATIENT
Start: 2018-12-09 | End: 2018-12-09

## 2018-12-09 RX ORDER — PIPERACILLIN AND TAZOBACTAM 4; .5 G/20ML; G/20ML
3.38 INJECTION, POWDER, LYOPHILIZED, FOR SOLUTION INTRAVENOUS EVERY 8 HOURS
Qty: 0 | Refills: 0 | Status: DISCONTINUED | OUTPATIENT
Start: 2018-12-09 | End: 2018-12-09

## 2018-12-09 RX ORDER — PIPERACILLIN AND TAZOBACTAM 4; .5 G/20ML; G/20ML
3.38 INJECTION, POWDER, LYOPHILIZED, FOR SOLUTION INTRAVENOUS EVERY 8 HOURS
Qty: 0 | Refills: 0 | Status: DISCONTINUED | OUTPATIENT
Start: 2018-12-09 | End: 2018-12-10

## 2018-12-09 RX ADMIN — MORPHINE SULFATE 60 MILLIGRAM(S): 50 CAPSULE, EXTENDED RELEASE ORAL at 06:35

## 2018-12-09 RX ADMIN — PIPERACILLIN AND TAZOBACTAM 200 GRAM(S): 4; .5 INJECTION, POWDER, LYOPHILIZED, FOR SOLUTION INTRAVENOUS at 07:02

## 2018-12-09 RX ADMIN — FENTANYL CITRATE 1 PATCH: 50 INJECTION INTRAVENOUS at 07:02

## 2018-12-09 RX ADMIN — Medication 1 MILLIGRAM(S): at 13:40

## 2018-12-09 RX ADMIN — PIPERACILLIN AND TAZOBACTAM 25 GRAM(S): 4; .5 INJECTION, POWDER, LYOPHILIZED, FOR SOLUTION INTRAVENOUS at 22:39

## 2018-12-09 RX ADMIN — Medication 25 MILLIGRAM(S): at 13:40

## 2018-12-09 RX ADMIN — Medication 1000 UNIT(S): at 13:40

## 2018-12-09 RX ADMIN — HYDROXYUREA 1000 MILLIGRAM(S): 500 CAPSULE ORAL at 06:34

## 2018-12-09 RX ADMIN — Medication 100 MILLIGRAM(S): at 17:44

## 2018-12-09 RX ADMIN — FENTANYL CITRATE 1 PATCH: 50 INJECTION INTRAVENOUS at 06:34

## 2018-12-09 RX ADMIN — Medication 250 MILLIGRAM(S): at 09:17

## 2018-12-09 RX ADMIN — ENOXAPARIN SODIUM 40 MILLIGRAM(S): 100 INJECTION SUBCUTANEOUS at 06:34

## 2018-12-09 RX ADMIN — Medication 250 MILLIGRAM(S): at 21:33

## 2018-12-09 RX ADMIN — MORPHINE SULFATE 60 MILLIGRAM(S): 50 CAPSULE, EXTENDED RELEASE ORAL at 17:44

## 2018-12-09 RX ADMIN — FENTANYL CITRATE 1 PATCH: 50 INJECTION INTRAVENOUS at 20:02

## 2018-12-09 RX ADMIN — Medication 25 MILLIGRAM(S): at 14:39

## 2018-12-09 RX ADMIN — Medication 100 MILLIGRAM(S): at 06:37

## 2018-12-09 RX ADMIN — HYDROXYUREA 1000 MILLIGRAM(S): 500 CAPSULE ORAL at 17:44

## 2018-12-09 RX ADMIN — MORPHINE SULFATE 30 MILLILITER(S): 50 CAPSULE, EXTENDED RELEASE ORAL at 07:22

## 2018-12-09 RX ADMIN — MORPHINE SULFATE 30 MILLILITER(S): 50 CAPSULE, EXTENDED RELEASE ORAL at 19:37

## 2018-12-09 RX ADMIN — FENTANYL CITRATE 1 PATCH: 50 INJECTION INTRAVENOUS at 17:00

## 2018-12-09 RX ADMIN — MORPHINE SULFATE 60 MILLIGRAM(S): 50 CAPSULE, EXTENDED RELEASE ORAL at 07:10

## 2018-12-09 NOTE — PROVIDER CONTACT NOTE (CRITICAL VALUE NOTIFICATION) - SITUATION
+ blood cultures drawn 12/7, after 30 hours incubation grew gram negative rods and gram positive cocci in clusters in aerobic bottle

## 2018-12-09 NOTE — PROGRESS NOTE ADULT - PROBLEM SELECTOR PLAN 1
- worsening pains x 4 days; possibly triggered by viral illness, dehydration, cold weather  - not relieved with home medications (fentanyl transdermal, morphine IR and ER  - continue morphine PCA   - continues on hydroxyurea, folic acid, MS contin, fentanyl transdermal  - incentive spirometry  - IVF hydration  - antitussive PRN
- worsening pains x 4 days; possibly triggered by viral illness, dehydration, cold weather  - not relieved with home medications (fentanyl transdermal, morphine IR and ER  - continue morphine PCA   - continues on hydroxyurea, folic acid, MS contin, fentanyl transdermal  - incentive spirometry  - IVF hydration  - antitussive PRN

## 2018-12-09 NOTE — PROGRESS NOTE ADULT - PROBLEM SELECTOR PLAN 3
- BUN/Cr = 10/1.05 (previously 9/0.84)  - clinically appears dehydrated on admission, s/p 2 liters NS in the ED and continued on maintenance IVF of 0.45 NS   - encourage oral fluid intake
one bottle of Bx from 12/7/18 positive with GPC and GNR  empirically covered with vanco and zosyn, repeat Bx sent today, f/u result.   Unclear source

## 2018-12-09 NOTE — PROGRESS NOTE ADULT - PROBLEM SELECTOR PLAN 5
DVT prophylaxis, lovenox SQ
- 15.7 on lab-work in 11/2018  - continuing supplementation w/ 1000 IU daily

## 2018-12-09 NOTE — PROGRESS NOTE ADULT - SUBJECTIVE AND OBJECTIVE BOX
Patient is a 43y old  Female who presents with a chief complaint of Sickle cell crisis (08 Dec 2018 15:14)      SUBJECTIVE / OVERNIGHT EVENTS:    MEDICATIONS  (STANDING):  cholecalciferol 1000 Unit(s) Oral daily  docusate sodium 100 milliGRAM(s) Oral two times a day  enoxaparin Injectable 40 milliGRAM(s) SubCutaneous every 24 hours  fentaNYL   Patch  50 MICROgram(s)/Hr 1 Patch Transdermal every 72 hours  folic acid 1 milliGRAM(s) Oral daily  hydroxyurea 1000 milliGRAM(s) Oral two times a day  influenza   Vaccine 0.5 milliLiter(s) IntraMuscular once  morphine ER Tablet 60 milliGRAM(s) Oral every 12 hours  morphine PCA (5 mG/mL) 30 milliLiter(s) PCA Continuous PCA Continuous  piperacillin/tazobactam IVPB. 3.375 Gram(s) IV Intermittent every 8 hours  sodium chloride 0.45%. 1000 milliLiter(s) (100 mL/Hr) IV Continuous <Continuous>  vancomycin  IVPB 1000 milliGRAM(s) IV Intermittent every 12 hours    MEDICATIONS  (PRN):  diphenhydrAMINE 25 milliGRAM(s) Oral every 4 hours PRN Rash and/or Itching  guaiFENesin   Syrup  (Sugar-Free) 100 milliGRAM(s) Oral every 6 hours PRN Cough  naloxone Injectable 0.1 milliGRAM(s) IV Push every 3 minutes PRN For ANY of the following changes in patient status:  A. RR LESS THAN 10 breaths per minute, B. Oxygen saturation LESS THAN 90%, C. Sedation score of 6  ondansetron Injectable 4 milliGRAM(s) IV Push every 6 hours PRN Nausea  senna 2 Tablet(s) Oral at bedtime PRN Constipation      Vital Signs Last 24 Hrs  T(C): 37 (09 Dec 2018 07:20), Max: 37 (09 Dec 2018 07:20)  T(F): 98.6 (09 Dec 2018 07:20), Max: 98.6 (09 Dec 2018 07:20)  HR: 72 (09 Dec 2018 07:20) (71 - 81)  BP: 103/63 (09 Dec 2018 07:20) (98/59 - 107/56)  BP(mean): --  RR: 18 (09 Dec 2018 07:20) (17 - 19)  SpO2: 98% (09 Dec 2018 07:20) (98% - 100%)  CAPILLARY BLOOD GLUCOSE        I&O's Summary      PHYSICAL EXAM:  GENERAL: NAD, well-developed  HEAD:  Atraumatic, Normocephalic  EYES: EOMI, PERRLA, conjunctiva and sclera clear  NECK: Supple, No JVD  CHEST/LUNG: Clear to auscultation bilaterally; No wheeze  HEART: Regular rate and rhythm; No murmurs, rubs, or gallops  ABDOMEN: Soft, Nontender, Nondistended; Bowel sounds present  EXTREMITIES:  2+ Peripheral Pulses, No clubbing, cyanosis, or edema  PSYCH: AAOx3  NEUROLOGY: non-focal  SKIN: No rashes or lesions    LABS:                        8.1    5.86  )-----------( 197      ( 07 Dec 2018 20:00 )             24.8     12-07    139  |  101  |  10  ----------------------------<  95  4.4   |  28  |  1.05    Ca    9.2      07 Dec 2018 20:00    TPro  8.5<H>  /  Alb  4.7  /  TBili  2.6<H>  /  DBili  x   /  AST  36<H>  /  ALT  9   /  AlkPhos  64  12-07              Culture - Blood (collected 07 Dec 2018 21:11)  Source: BLOOD VENOUS  Preliminary Report (08 Dec 2018 21:10):    NO ORGANISMS ISOLATED    NO ORGANISMS ISOLATED AT 24 HOURS    Culture - Blood (collected 07 Dec 2018 21:11)  Source: BLOOD  Preliminary Report (09 Dec 2018 06:19):    ***Blood Panel PCR results on this specimen are available    approximately 3 hours after the Gram stain result***    Gram stain, PCR, and/or culture results may not always    correspond due to difference in methodologies    ------------------------------------------------------------    This PCR assay was performed using [employer].  The    following targets are tested for:  Enterococcus, vancomycin    resistant enterococci, Listeria monocytogenes,  coagulase    negative staphylococci, S. aureus, methicillin resistant S.    aureus, Streptococcus agalactiae (Group B), S. pneumoniae,    S. pyogenes (Group A), Acinetobacter baumannii, Enterobacter    cloacae, E. coli, Klebsiella oxytoca, K. pneumoniae, Proteus    sp., Serratia marcescens, Haemophilus influenzae, Neisseria    meningitidis, Pseudomonas aeruginosa, Candida albicans, C.    glabrata, C. krusei, C. parapsilosis, C. tropicalis and the    KPC resistance gene.    **NOTE: Due to technical problems, Proteus sp. will NOT be    reported as part of the BCID paneluntil further notice.  Preliminary Report (08 Dec 2018 21:10):    NO ORGANISMS ISOLATED    NO ORGANISMS ISOLATED AT 24 HOURS  Organism: BLOOD CULTURE PCR (09 Dec 2018 06:19)  Organism: BLOOD CULTURE PCR (09 Dec 2018 06:19)          RADIOLOGY & ADDITIONAL TESTS:    Imaging Personally Reviewed:    Consultant(s) Notes Reviewed:      Care Discussed with Consultants/Other Providers: Patient is a 43y old  Female who presents with a chief complaint of Sickle cell crisis (08 Dec 2018 15:14)      SUBJECTIVE / OVERNIGHT EVENTS: Afebrile, Pt reported pain improved, wants to go home taking care her children. After explained of possible bacteremia, and discussed with mother, she finally agreed to stay and repeat Bx.     MEDICATIONS  (STANDING):  cholecalciferol 1000 Unit(s) Oral daily  docusate sodium 100 milliGRAM(s) Oral two times a day  enoxaparin Injectable 40 milliGRAM(s) SubCutaneous every 24 hours  fentaNYL   Patch  50 MICROgram(s)/Hr 1 Patch Transdermal every 72 hours  folic acid 1 milliGRAM(s) Oral daily  hydroxyurea 1000 milliGRAM(s) Oral two times a day  influenza   Vaccine 0.5 milliLiter(s) IntraMuscular once  morphine ER Tablet 60 milliGRAM(s) Oral every 12 hours  morphine PCA (5 mG/mL) 30 milliLiter(s) PCA Continuous PCA Continuous  piperacillin/tazobactam IVPB. 3.375 Gram(s) IV Intermittent every 8 hours  sodium chloride 0.45%. 1000 milliLiter(s) (100 mL/Hr) IV Continuous <Continuous>  vancomycin  IVPB 1000 milliGRAM(s) IV Intermittent every 12 hours    MEDICATIONS  (PRN):  diphenhydrAMINE 25 milliGRAM(s) Oral every 4 hours PRN Rash and/or Itching  guaiFENesin   Syrup  (Sugar-Free) 100 milliGRAM(s) Oral every 6 hours PRN Cough  naloxone Injectable 0.1 milliGRAM(s) IV Push every 3 minutes PRN For ANY of the following changes in patient status:  A. RR LESS THAN 10 breaths per minute, B. Oxygen saturation LESS THAN 90%, C. Sedation score of 6  ondansetron Injectable 4 milliGRAM(s) IV Push every 6 hours PRN Nausea  senna 2 Tablet(s) Oral at bedtime PRN Constipation      Vital Signs Last 24 Hrs  T(C): 37 (09 Dec 2018 07:20), Max: 37 (09 Dec 2018 07:20)  T(F): 98.6 (09 Dec 2018 07:20), Max: 98.6 (09 Dec 2018 07:20)  HR: 72 (09 Dec 2018 07:20) (71 - 81)  BP: 103/63 (09 Dec 2018 07:20) (98/59 - 107/56)  BP(mean): --  RR: 18 (09 Dec 2018 07:20) (17 - 19)  SpO2: 98% (09 Dec 2018 07:20) (98% - 100%)  CAPILLARY BLOOD GLUCOSE        I&O's Summary      PHYSICAL EXAM:  GENERAL: NAD, well-developed  HEAD:  Atraumatic, Normocephalic  EYES: EOMI, PERRLA, conjunctiva and sclera clear  NECK: Supple, No JVD  CHEST/LUNG: Clear to auscultation bilaterally; No wheeze  HEART: Regular rate and rhythm; No murmurs, rubs, or gallops  ABDOMEN: Soft, Nontender, Nondistended; Bowel sounds present  EXTREMITIES:  2+ Peripheral Pulses, No clubbing, cyanosis, or edema  PSYCH: AAOx3  NEUROLOGY: non-focal  SKIN: No rashes or lesions    LABS:                        8.1    5.86  )-----------( 197      ( 07 Dec 2018 20:00 )             24.8     12-07    139  |  101  |  10  ----------------------------<  95  4.4   |  28  |  1.05    Ca    9.2      07 Dec 2018 20:00    TPro  8.5<H>  /  Alb  4.7  /  TBili  2.6<H>  /  DBili  x   /  AST  36<H>  /  ALT  9   /  AlkPhos  64  12-07              Culture - Blood (collected 07 Dec 2018 21:11)  Source: BLOOD VENOUS  Preliminary Report (08 Dec 2018 21:10):    NO ORGANISMS ISOLATED    NO ORGANISMS ISOLATED AT 24 HOURS    Culture - Blood (collected 07 Dec 2018 21:11)  Source: BLOOD  Preliminary Report (09 Dec 2018 06:19):    ***Blood Panel PCR results on this specimen are available    approximately 3 hours after the Gram stain result***    Gram stain, PCR, and/or culture results may not always    correspond due to difference in methodologies    ------------------------------------------------------------    This PCR assay was performed using [employer].  The    following targets are tested for:  Enterococcus, vancomycin    resistant enterococci, Listeria monocytogenes,  coagulase    negative staphylococci, S. aureus, methicillin resistant S.    aureus, Streptococcus agalactiae (Group B), S. pneumoniae,    S. pyogenes (Group A), Acinetobacter baumannii, Enterobacter    cloacae, E. coli, Klebsiella oxytoca, K. pneumoniae, Proteus    sp., Serratia marcescens, Haemophilus influenzae, Neisseria    meningitidis, Pseudomonas aeruginosa, Candida albicans, C.    glabrata, C. krusei, C. parapsilosis, C. tropicalis and the    KPC resistance gene.    **NOTE: Due to technical problems, Proteus sp. will NOT be    reported as part of the BCID paneluntil further notice.  Preliminary Report (08 Dec 2018 21:10):    NO ORGANISMS ISOLATED    NO ORGANISMS ISOLATED AT 24 HOURS  Organism: BLOOD CULTURE PCR (09 Dec 2018 06:19)  Organism: BLOOD CULTURE PCR (09 Dec 2018 06:19)          RADIOLOGY & ADDITIONAL TESTS:    Imaging Personally Reviewed:    Consultant(s) Notes Reviewed:      Care Discussed with Consultants/Other Providers:

## 2018-12-09 NOTE — PROGRESS NOTE ADULT - PROBLEM SELECTOR PLAN 2
- does not appear to be of cardiac etiology or ACS  - no ECG abnormality, no hypoxic, CXR clear  - improved on morphine PCA,   - incentive spirometry
- does not appear to be of cardiac etiology or ACS  - no ECG abnormality, no hypoxic, CXR clear  - improved on morphine PCA,   - incentive spirometry

## 2018-12-09 NOTE — PROGRESS NOTE ADULT - PROBLEM SELECTOR PLAN 4
- 15.7 on lab-work in 11/2018  - continuing supplementation w/ 1000 IU daily
- BUN/Cr = 10/1.05 (previously 9/0.84)  - clinically appears dehydrated on admission, s/p 2 liters NS in the ED and continued on maintenance IVF of 0.45 NS   - encourage oral fluid intake

## 2018-12-10 VITALS
DIASTOLIC BLOOD PRESSURE: 61 MMHG | TEMPERATURE: 99 F | HEART RATE: 84 BPM | SYSTOLIC BLOOD PRESSURE: 103 MMHG | RESPIRATION RATE: 18 BRPM | OXYGEN SATURATION: 98 %

## 2018-12-10 LAB
BASOPHILS # BLD AUTO: 0.04 K/UL — SIGNIFICANT CHANGE UP (ref 0–0.2)
BASOPHILS NFR BLD AUTO: 0.8 % — SIGNIFICANT CHANGE UP (ref 0–2)
BUN SERPL-MCNC: 7 MG/DL — SIGNIFICANT CHANGE UP (ref 7–23)
CALCIUM SERPL-MCNC: 8.8 MG/DL — SIGNIFICANT CHANGE UP (ref 8.4–10.5)
CHLORIDE SERPL-SCNC: 103 MMOL/L — SIGNIFICANT CHANGE UP (ref 98–107)
CO2 SERPL-SCNC: 24 MMOL/L — SIGNIFICANT CHANGE UP (ref 22–31)
CREAT SERPL-MCNC: 0.93 MG/DL — SIGNIFICANT CHANGE UP (ref 0.5–1.3)
EOSINOPHIL # BLD AUTO: 0.48 K/UL — SIGNIFICANT CHANGE UP (ref 0–0.5)
EOSINOPHIL NFR BLD AUTO: 9.7 % — HIGH (ref 0–6)
GLUCOSE SERPL-MCNC: 92 MG/DL — SIGNIFICANT CHANGE UP (ref 70–99)
HAPTOGLOB SERPL-MCNC: < 20 MG/DL — LOW (ref 34–200)
HCT VFR BLD CALC: 21.2 % — LOW (ref 34.5–45)
HGB BLD-MCNC: 7.2 G/DL — LOW (ref 11.5–15.5)
IMM GRANULOCYTES # BLD AUTO: 0.01 # — SIGNIFICANT CHANGE UP
IMM GRANULOCYTES NFR BLD AUTO: 0.2 % — SIGNIFICANT CHANGE UP (ref 0–1.5)
LYMPHOCYTES # BLD AUTO: 2.38 K/UL — SIGNIFICANT CHANGE UP (ref 1–3.3)
LYMPHOCYTES # BLD AUTO: 48.3 % — HIGH (ref 13–44)
MCHC RBC-ENTMCNC: 27.3 PG — SIGNIFICANT CHANGE UP (ref 27–34)
MCHC RBC-ENTMCNC: 34 % — SIGNIFICANT CHANGE UP (ref 32–36)
MCV RBC AUTO: 80.3 FL — SIGNIFICANT CHANGE UP (ref 80–100)
MONOCYTES # BLD AUTO: 0.43 K/UL — SIGNIFICANT CHANGE UP (ref 0–0.9)
MONOCYTES NFR BLD AUTO: 8.7 % — SIGNIFICANT CHANGE UP (ref 2–14)
NEUTROPHILS # BLD AUTO: 1.59 K/UL — LOW (ref 1.8–7.4)
NEUTROPHILS NFR BLD AUTO: 32.3 % — LOW (ref 43–77)
NRBC # FLD: 0 — SIGNIFICANT CHANGE UP
ORGANISM # SPEC MICROSCOPIC CNT: SIGNIFICANT CHANGE UP
PLATELET # BLD AUTO: 186 K/UL — SIGNIFICANT CHANGE UP (ref 150–400)
PMV BLD: 9.4 FL — SIGNIFICANT CHANGE UP (ref 7–13)
POTASSIUM SERPL-MCNC: 3.8 MMOL/L — SIGNIFICANT CHANGE UP (ref 3.5–5.3)
POTASSIUM SERPL-SCNC: 3.8 MMOL/L — SIGNIFICANT CHANGE UP (ref 3.5–5.3)
RBC # BLD: 2.64 M/UL — LOW (ref 3.8–5.2)
RBC # FLD: 13.5 % — SIGNIFICANT CHANGE UP (ref 10.3–14.5)
RETICS #: 54 K/UL — SIGNIFICANT CHANGE UP (ref 25–125)
RETICS/RBC NFR: 2.1 % — SIGNIFICANT CHANGE UP (ref 0.5–2.5)
SODIUM SERPL-SCNC: 139 MMOL/L — SIGNIFICANT CHANGE UP (ref 135–145)
SPECIMEN SOURCE: SIGNIFICANT CHANGE UP
SPECIMEN SOURCE: SIGNIFICANT CHANGE UP
WBC # BLD: 4.93 K/UL — SIGNIFICANT CHANGE UP (ref 3.8–10.5)
WBC # FLD AUTO: 4.93 K/UL — SIGNIFICANT CHANGE UP (ref 3.8–10.5)

## 2018-12-10 PROCEDURE — 99239 HOSP IP/OBS DSCHRG MGMT >30: CPT

## 2018-12-10 RX ORDER — MORPHINE SULFATE 50 MG/1
1 CAPSULE, EXTENDED RELEASE ORAL
Qty: 0 | Refills: 0 | COMMUNITY

## 2018-12-10 RX ORDER — FENTANYL CITRATE 50 UG/ML
1 INJECTION INTRAVENOUS
Qty: 0 | Refills: 0 | COMMUNITY

## 2018-12-10 RX ORDER — MORPHINE SULFATE 50 MG/1
1 CAPSULE, EXTENDED RELEASE ORAL
Qty: 12 | Refills: 0 | OUTPATIENT
Start: 2018-12-10 | End: 2018-12-12

## 2018-12-10 RX ORDER — MORPHINE SULFATE 50 MG/1
1 CAPSULE, EXTENDED RELEASE ORAL
Qty: 6 | Refills: 0 | OUTPATIENT
Start: 2018-12-10 | End: 2018-12-12

## 2018-12-10 RX ORDER — ACETAMINOPHEN 500 MG
650 TABLET ORAL ONCE
Qty: 0 | Refills: 0 | Status: COMPLETED | OUTPATIENT
Start: 2018-12-10 | End: 2018-12-10

## 2018-12-10 RX ORDER — MORPHINE SULFATE 50 MG/1
1 CAPSULE, EXTENDED RELEASE ORAL
Qty: 12 | Refills: 0
Start: 2018-12-10 | End: 2018-12-12

## 2018-12-10 RX ORDER — FENTANYL CITRATE 50 UG/ML
1 INJECTION INTRAVENOUS
Qty: 3 | Refills: 0
Start: 2018-12-10 | End: 2018-12-18

## 2018-12-10 RX ORDER — SODIUM CHLORIDE 9 MG/ML
1000 INJECTION, SOLUTION INTRAVENOUS
Qty: 0 | Refills: 0 | Status: DISCONTINUED | OUTPATIENT
Start: 2018-12-10 | End: 2018-12-10

## 2018-12-10 RX ORDER — FENTANYL CITRATE 50 UG/ML
1 INJECTION INTRAVENOUS
Qty: 3 | Refills: 0 | DISCHARGE
Start: 2018-12-10 | End: 2018-12-18

## 2018-12-10 RX ORDER — MORPHINE SULFATE 50 MG/1
1 CAPSULE, EXTENDED RELEASE ORAL
Qty: 10 | Refills: 0
Start: 2018-12-10 | End: 2018-12-14

## 2018-12-10 RX ADMIN — Medication 25 MILLIGRAM(S): at 12:18

## 2018-12-10 RX ADMIN — Medication 650 MILLIGRAM(S): at 15:11

## 2018-12-10 RX ADMIN — Medication 1000 UNIT(S): at 12:18

## 2018-12-10 RX ADMIN — MORPHINE SULFATE 30 MILLILITER(S): 50 CAPSULE, EXTENDED RELEASE ORAL at 19:28

## 2018-12-10 RX ADMIN — MORPHINE SULFATE 60 MILLIGRAM(S): 50 CAPSULE, EXTENDED RELEASE ORAL at 06:09

## 2018-12-10 RX ADMIN — MORPHINE SULFATE 30 MILLILITER(S): 50 CAPSULE, EXTENDED RELEASE ORAL at 07:24

## 2018-12-10 RX ADMIN — PIPERACILLIN AND TAZOBACTAM 25 GRAM(S): 4; .5 INJECTION, POWDER, LYOPHILIZED, FOR SOLUTION INTRAVENOUS at 06:09

## 2018-12-10 RX ADMIN — MORPHINE SULFATE 60 MILLIGRAM(S): 50 CAPSULE, EXTENDED RELEASE ORAL at 06:43

## 2018-12-10 RX ADMIN — MORPHINE SULFATE 30 MILLILITER(S): 50 CAPSULE, EXTENDED RELEASE ORAL at 10:01

## 2018-12-10 RX ADMIN — ENOXAPARIN SODIUM 40 MILLIGRAM(S): 100 INJECTION SUBCUTANEOUS at 06:09

## 2018-12-10 RX ADMIN — Medication 1 MILLIGRAM(S): at 12:18

## 2018-12-10 RX ADMIN — Medication 100 MILLIGRAM(S): at 17:56

## 2018-12-10 RX ADMIN — FENTANYL CITRATE 1 PATCH: 50 INJECTION INTRAVENOUS at 18:41

## 2018-12-10 RX ADMIN — SODIUM CHLORIDE 100 MILLILITER(S): 9 INJECTION, SOLUTION INTRAVENOUS at 10:38

## 2018-12-10 RX ADMIN — Medication 250 MILLIGRAM(S): at 09:21

## 2018-12-10 RX ADMIN — PIPERACILLIN AND TAZOBACTAM 25 GRAM(S): 4; .5 INJECTION, POWDER, LYOPHILIZED, FOR SOLUTION INTRAVENOUS at 15:12

## 2018-12-10 RX ADMIN — FENTANYL CITRATE 1 PATCH: 50 INJECTION INTRAVENOUS at 06:43

## 2018-12-10 RX ADMIN — HYDROXYUREA 1000 MILLIGRAM(S): 500 CAPSULE ORAL at 17:56

## 2018-12-10 RX ADMIN — MORPHINE SULFATE 60 MILLIGRAM(S): 50 CAPSULE, EXTENDED RELEASE ORAL at 17:56

## 2018-12-10 RX ADMIN — Medication 100 MILLIGRAM(S): at 06:08

## 2018-12-10 RX ADMIN — HYDROXYUREA 1000 MILLIGRAM(S): 500 CAPSULE ORAL at 06:08

## 2018-12-10 RX ADMIN — Medication 650 MILLIGRAM(S): at 15:50

## 2018-12-10 RX ADMIN — MORPHINE SULFATE 60 MILLIGRAM(S): 50 CAPSULE, EXTENDED RELEASE ORAL at 18:41

## 2018-12-10 NOTE — PROGRESS NOTE ADULT - SUBJECTIVE AND OBJECTIVE BOX
[facility] Medicine  Pager [phone]    Patient is a 43y old  Female who presents with a chief complaint of Sickle cell crisis (09 Dec 2018 10:08)      INTERVAL HPI/OVERNIGHT EVENTS:    MEDICATIONS  (STANDING):  cholecalciferol 1000 Unit(s) Oral daily  docusate sodium 100 milliGRAM(s) Oral two times a day  enoxaparin Injectable 40 milliGRAM(s) SubCutaneous every 24 hours  fentaNYL   Patch  50 MICROgram(s)/Hr 1 Patch Transdermal every 72 hours  folic acid 1 milliGRAM(s) Oral daily  hydroxyurea 1000 milliGRAM(s) Oral two times a day  influenza   Vaccine 0.5 milliLiter(s) IntraMuscular once  morphine ER Tablet 60 milliGRAM(s) Oral every 12 hours  morphine PCA (5 mG/mL) 30 milliLiter(s) PCA Continuous PCA Continuous  piperacillin/tazobactam IVPB. 3.375 Gram(s) IV Intermittent every 8 hours  sodium chloride 0.45%. 1000 milliLiter(s) (100 mL/Hr) IV Continuous <Continuous>  vancomycin  IVPB 1000 milliGRAM(s) IV Intermittent every 12 hours    MEDICATIONS  (PRN):  diphenhydrAMINE 25 milliGRAM(s) Oral every 4 hours PRN Rash and/or Itching  guaiFENesin   Syrup  (Sugar-Free) 100 milliGRAM(s) Oral every 6 hours PRN Cough  naloxone Injectable 0.1 milliGRAM(s) IV Push every 3 minutes PRN For ANY of the following changes in patient status:  A. RR LESS THAN 10 breaths per minute, B. Oxygen saturation LESS THAN 90%, C. Sedation score of 6  ondansetron Injectable 4 milliGRAM(s) IV Push every 6 hours PRN Nausea  senna 2 Tablet(s) Oral at bedtime PRN Constipation      Allergies    No Known Allergies    Intolerances        REVIEW OF SYSTEMS:  Please see interval HPI:    Vital Signs Last 24 Hrs  T(C): 36.7 (10 Dec 2018 11:32), Max: 37.4 (09 Dec 2018 15:47)  T(F): 98.1 (10 Dec 2018 11:32), Max: 99.3 (09 Dec 2018 15:47)  HR: 85 (10 Dec 2018 11:32) (74 - 90)  BP: 98/58 (10 Dec 2018 11:32) (83/46 - 108/64)  BP(mean): --  RR: 18 (10 Dec 2018 11:32) (18 - 18)  SpO2: 99% (10 Dec 2018 11:32) (98% - 99%)  I&O's Detail        PHYSICAL EXAM:  GENERAL:   HEAD:    EYES:   ENMT:   NECK:   NERVOUS SYSTEM:    CHEST/LUNG:   HEART:   ABDOMEN:   EXTREMITIES:    LYMPH:   SKIN:     LABS:                        7.2    4.93  )-----------( 186      ( 10 Dec 2018 05:40 )             21.2     10 Dec 2018 05:40    139    |  103    |  7      ----------------------------<  92     3.8     |  24     |  0.93     Ca    8.8        10 Dec 2018 05:40        CAPILLARY BLOOD GLUCOSE        BLOOD CULTURE  12-07 @ 21:11 --    ***Blood Panel PCR results on this specimen are available  approximately 3 hours after the Gram stain result***  Gram stain, PCR, and/or culture results may not always  correspond due to difference in methodologies  ------------------------------------------------------------  This PCR assay was performed using [employer].  The  following targets are tested for:  Enterococcus, vancomycin  resistant enterococci, Listeria monocytogenes,  coagulase  negative staphylococci, S. aureus, methicillin resistant S.  aureus, Streptococcus agalactiae (Group B), S. pneumoniae,  S. pyogenes (Group A), Acinetobacter baumannii, Enterobacter  cloacae, E. coli, Klebsiella oxytoca, K. pneumoniae, Proteus  sp., Serratia marcescens, Haemophilus influenzae, Neisseria  meningitidis, Pseudomonas aeruginosa, Candida albicans, C.  glabrata, C. krusei, C. parapsilosis, C. tropicalis and the  KPC resistance gene.  **NOTE: Due to technical problems, Proteus sp. will NOT be  reported as part of the BCID paneluntil further notice.  BLOOD CULTURE PCR  ***Blood Panel PCR results on this specimen are available  approximately 3 hours after the Gram stain result***  Gram stain, PCR, and/or culture results may not always  correspond due to difference in methodologies  ------------------------------------------------------------  This PCR assay was performed using [employer].  The  following targets are tested for:  Enterococcus, vancomycin  resistant enterococci, Listeria monocytogenes,  coagulase  negative staphylococci, S. aureus, methicillin resistant S.  aureus, Streptococcus agalactiae (Group B), S. pneumoniae,  S. pyogenes (Group A), Acinetobacter baumannii, Enterobacter  cloacae, E. coli, Klebsiella oxytoca, K. pneumoniae, Proteus  sp., Serratia marcescens, Haemophilus influenzae, Neisseria  meningitidis, Pseudomonas aeruginosa, Candida albicans, C.  glabrata, C. krusei, C. parapsilosis, C. tropicalis and the  KPC resistance gene.  **NOTE: Due to technical problems, Proteus sp. will NOT be  reported as part of the BCID panel until further notice.    RADIOLOGY & ADDITIONAL TESTS:    Imaging Personally Reviewed:  [ ] YES     Consultant(s) Notes Reviewed:      Care Discussed with Consultants/Other Providers: [facility] Medicine  Pager [phone]    Patient is a 43y old  Female who presents with a chief complaint of Sickle cell crisis (09 Dec 2018 10:08)    INTERVAL HPI/OVERNIGHT EVENTS:  Patient reports pain much better, wants to leave the hospital today, given concerns regarding arranging childcare. Requests pain meds for few days to cover her until her appointment with her heme/onc. Discussed blood culture results. Anticipatory guidance provided, instructed to return if develops fever/worsening symptoms.     MEDICATIONS  (STANDING):  cholecalciferol 1000 Unit(s) Oral daily  docusate sodium 100 milliGRAM(s) Oral two times a day  enoxaparin Injectable 40 milliGRAM(s) SubCutaneous every 24 hours  fentaNYL   Patch  50 MICROgram(s)/Hr 1 Patch Transdermal every 72 hours  folic acid 1 milliGRAM(s) Oral daily  hydroxyurea 1000 milliGRAM(s) Oral two times a day  influenza   Vaccine 0.5 milliLiter(s) IntraMuscular once  morphine ER Tablet 60 milliGRAM(s) Oral every 12 hours  morphine PCA (5 mG/mL) 30 milliLiter(s) PCA Continuous PCA Continuous  piperacillin/tazobactam IVPB. 3.375 Gram(s) IV Intermittent every 8 hours  sodium chloride 0.45%. 1000 milliLiter(s) (100 mL/Hr) IV Continuous <Continuous>  vancomycin  IVPB 1000 milliGRAM(s) IV Intermittent every 12 hours    MEDICATIONS  (PRN):  diphenhydrAMINE 25 milliGRAM(s) Oral every 4 hours PRN Rash and/or Itching  guaiFENesin   Syrup  (Sugar-Free) 100 milliGRAM(s) Oral every 6 hours PRN Cough  naloxone Injectable 0.1 milliGRAM(s) IV Push every 3 minutes PRN For ANY of the following changes in patient status:  A. RR LESS THAN 10 breaths per minute, B. Oxygen saturation LESS THAN 90%, C. Sedation score of 6  ondansetron Injectable 4 milliGRAM(s) IV Push every 6 hours PRN Nausea  senna 2 Tablet(s) Oral at bedtime PRN Constipation    Allergies  No Known Allergies    Intolerances    REVIEW OF SYSTEMS:  Please see interval HPI:    Vital Signs Last 24 Hrs  T(C): 36.7 (10 Dec 2018 11:32), Max: 37.4 (09 Dec 2018 15:47)  T(F): 98.1 (10 Dec 2018 11:32), Max: 99.3 (09 Dec 2018 15:47)  HR: 85 (10 Dec 2018 11:32) (74 - 90)  BP: 98/58 (10 Dec 2018 11:32) (83/46 - 108/64)  BP(mean): --  RR: 18 (10 Dec 2018 11:32) (18 - 18)  SpO2: 99% (10 Dec 2018 11:32) (98% - 99%)  I&O's Detail      PHYSICAL EXAM:  GENERAL: NAD, sitting in bed  HEAD:  NC/AT  EYES: EOMI, clear sclera/conjunctiva  ENMT: MMM  NECK: supple  NERVOUS SYSTEM: AOx3, non-focal    CHEST/LUNG: CTAB  HEART: S1S2 RRR  ABDOMEN: soft, non-tender  EXTREMITIES:  no c/c/e      LABS:                        7.2    4.93  )-----------( 186      ( 10 Dec 2018 05:40 )             21.2     10 Dec 2018 05:40    139    |  103    |  7      ----------------------------<  92     3.8     |  24     |  0.93     Ca    8.8        10 Dec 2018 05:40    CAPILLARY BLOOD GLUCOSE    BLOOD CULTURE  12-07 @ 21:11 --    ***Blood Panel PCR results on this specimen are available  approximately 3 hours after the Gram stain result***  Gram stain, PCR, and/or culture results may not always  correspond due to difference in methodologies  ------------------------------------------------------------  This PCR assay was performed using [employer].  The  following targets are tested for:  Enterococcus, vancomycin  resistant enterococci, Listeria monocytogenes,  coagulase  negative staphylococci, S. aureus, methicillin resistant S.  aureus, Streptococcus agalactiae (Group B), S. pneumoniae,  S. pyogenes (Group A), Acinetobacter baumannii, Enterobacter  cloacae, E. coli, Klebsiella oxytoca, K. pneumoniae, Proteus  sp., Serratia marcescens, Haemophilus influenzae, Neisseria  meningitidis, Pseudomonas aeruginosa, Candida albicans, C.  glabrata, C. krusei, C. parapsilosis, C. tropicalis and the  KPC resistance gene.  **NOTE: Due to technical problems, Proteus sp. will NOT be  reported as part of the BCID paneluntil further notice.  BLOOD CULTURE PCR  ***Blood Panel PCR results on this specimen are available  approximately 3 hours after the Gram stain result***  Gram stain, PCR, and/or culture results may not always  correspond due to difference in methodologies  ------------------------------------------------------------  This PCR assay was performed using [employer].  The  following targets are tested for:  Enterococcus, vancomycin  resistant enterococci, Listeria monocytogenes,  coagulase  negative staphylococci, S. aureus, methicillin resistant S.  aureus, Streptococcus agalactiae (Group B), S. pneumoniae,  S. pyogenes (Group A), Acinetobacter baumannii, Enterobacter  cloacae, E. coli, Klebsiella oxytoca, K. pneumoniae, Proteus  sp., Serratia marcescens, Haemophilus influenzae, Neisseria  meningitidis, Pseudomonas aeruginosa, Candida albicans, C.  glabrata, C. krusei, C. parapsilosis, C. tropicalis and the  KPC resistance gene.  **NOTE: Due to technical problems, Proteus sp. will NOT be  reported as part of the BCID panel until further notice.    RADIOLOGY & ADDITIONAL TESTS:    Imaging Personally Reviewed:  [ ] YES     Consultant(s) Notes Reviewed:      Care Discussed with Consultants/Other Providers: ID Dr. Chappell, ADS JUAN LUIS Glynn

## 2018-12-10 NOTE — PROVIDER CONTACT NOTE (MEDICATION) - ACTION/TREATMENT ORDERED:
Okay to be discharged with Fentanyl patch on right shoulder as per ADS NP Karen Iglesias. Patient has prescription for patches sent to pharmacy. Will continue to monitor patient.

## 2018-12-10 NOTE — PROGRESS NOTE ADULT - ASSESSMENT
43 year old female, with past history significant for Hb-SS disease with crisis, Acute chest syndrome and Smoking, presented to the ED secondary to worsening of chest pain, and joint pain, was admitted for Sickle Cell Crisis.
43 year old female, with past history significant for Hb-SS disease with crisis, Acute chest syndrome and Smoking, presented to the ED secondary to worsening of chest pain, and joint pain, was admitted for Sickle Cell Crisis.
44 yo F w/ Hb-SS disease c/b ACS in past, a/w sickle cell crisis. Course c/b possible bacteremia (mirococcus luteus).     # Bacteremia (Mirococcus luteus)  - repeat Bcx NG@24 hr, patient afebrile, without leukocytosis, non-toxic appearing  - mirococcus luteus only found in 1/4 bottles from 12/7, d/w ID Dr. Chappell, can discontinue antibiotics, doubt clinical significance given overall picture    # Sickle cell crisis  - possibly triggered by viral illness, dehydration, cold weather  - reports pain improved s/p PCA  - on fentanyl patch, mscontin bid, 1/2 NS IV fluids   - c/w folic acid, hydroxyurea  - c/w bowel regimen to prevent opioid induced constipation  - will discharge home with few days supply of home regimen  - otpt heme/onc f/u    # vitD deficiency  - c/w vit D supplementation    DVT ppx: lovenox    Dispo: appreciate ID input, patient in agreement with discharge plan, anticipatory guidance provided, patient will followup with her heme/onc as otpt    Discharge time 35 minutes

## 2018-12-12 LAB — BACTERIA BLD CULT: SIGNIFICANT CHANGE UP

## 2018-12-14 LAB
BACTERIA BLD CULT: SIGNIFICANT CHANGE UP
BACTERIA BLD CULT: SIGNIFICANT CHANGE UP

## 2018-12-15 LAB
ORGANISM # SPEC MICROSCOPIC CNT: SIGNIFICANT CHANGE UP
ORGANISM # SPEC MICROSCOPIC CNT: SIGNIFICANT CHANGE UP

## 2019-02-06 LAB
-  CANDIDA ALBICANS: SIGNIFICANT CHANGE UP
-  CANDIDA GLABRATA: SIGNIFICANT CHANGE UP
-  CANDIDA KRUSEI: SIGNIFICANT CHANGE UP
-  CANDIDA PARAPSILOSIS: SIGNIFICANT CHANGE UP
-  CANDIDA TROPICALIS: SIGNIFICANT CHANGE UP
-  COAGULASE NEGATIVE STAPHYLOCOCCUS: SIGNIFICANT CHANGE UP
-  K. PNEUMONIAE GROUP: SIGNIFICANT CHANGE UP
-  KPC RESISTANCE GENE: SIGNIFICANT CHANGE UP
-  STREPTOCOCCUS SP. (NOT GRP A, B OR S PNEUMONIAE): SIGNIFICANT CHANGE UP
A BAUMANNII DNA SPEC QL NAA+PROBE: SIGNIFICANT CHANGE UP
BACTERIA BLD CULT: SIGNIFICANT CHANGE UP
E CLOAC COMP DNA BLD POS QL NAA+PROBE: SIGNIFICANT CHANGE UP
E COLI DNA BLD POS QL NAA+NON-PROBE: SIGNIFICANT CHANGE UP
ENTEROCOC DNA BLD POS QL NAA+NON-PROBE: SIGNIFICANT CHANGE UP
ENTEROCOC DNA BLD POS QL NAA+NON-PROBE: SIGNIFICANT CHANGE UP
GP B STREP DNA BLD POS QL NAA+NON-PROBE: SIGNIFICANT CHANGE UP
HAEM INFLU DNA BLD POS QL NAA+NON-PROBE: SIGNIFICANT CHANGE UP
K OXYTOCA DNA BLD POS QL NAA+NON-PROBE: SIGNIFICANT CHANGE UP
L MONOCYTOG DNA BLD POS QL NAA+NON-PROBE: SIGNIFICANT CHANGE UP
MRSA SPEC QL CULT: SIGNIFICANT CHANGE UP
MSSA DNA SPEC QL NAA+PROBE: SIGNIFICANT CHANGE UP
N MEN ISLT CULT: SIGNIFICANT CHANGE UP
ORGANISM # SPEC MICROSCOPIC CNT: SIGNIFICANT CHANGE UP
ORGANISM # SPEC MICROSCOPIC CNT: SIGNIFICANT CHANGE UP
P AERUGINOSA DNA BLD POS NAA+NON-PROBE: SIGNIFICANT CHANGE UP
S MARCESCENS DNA BLD POS NAA+NON-PROBE: SIGNIFICANT CHANGE UP
S PNEUM DNA BLD POS QL NAA+NON-PROBE: SIGNIFICANT CHANGE UP
S PYO DNA BLD POS QL NAA+NON-PROBE: SIGNIFICANT CHANGE UP

## 2019-03-15 ENCOUNTER — INPATIENT (INPATIENT)
Facility: HOSPITAL | Age: 44
LOS: 1 days | Discharge: HOME | End: 2019-03-17
Attending: STUDENT IN AN ORGANIZED HEALTH CARE EDUCATION/TRAINING PROGRAM | Admitting: STUDENT IN AN ORGANIZED HEALTH CARE EDUCATION/TRAINING PROGRAM
Payer: MEDICAID

## 2019-03-15 VITALS
SYSTOLIC BLOOD PRESSURE: 128 MMHG | DIASTOLIC BLOOD PRESSURE: 69 MMHG | OXYGEN SATURATION: 100 % | RESPIRATION RATE: 16 BRPM | TEMPERATURE: 98 F | HEART RATE: 83 BPM

## 2019-03-15 DIAGNOSIS — Z98.890 OTHER SPECIFIED POSTPROCEDURAL STATES: Chronic | ICD-10-CM

## 2019-03-15 DIAGNOSIS — Z98.891 HISTORY OF UTERINE SCAR FROM PREVIOUS SURGERY: Chronic | ICD-10-CM

## 2019-03-15 LAB
ALBUMIN SERPL ELPH-MCNC: 4.4 G/DL — SIGNIFICANT CHANGE UP (ref 3.3–5)
ALP SERPL-CCNC: 57 U/L — SIGNIFICANT CHANGE UP (ref 40–120)
ALT FLD-CCNC: 9 U/L — SIGNIFICANT CHANGE UP (ref 4–33)
ANION GAP SERPL CALC-SCNC: 11 MMO/L — SIGNIFICANT CHANGE UP (ref 7–14)
ANISOCYTOSIS BLD QL: SIGNIFICANT CHANGE UP
APTT BLD: 34.8 SEC — SIGNIFICANT CHANGE UP (ref 27.5–36.3)
AST SERPL-CCNC: 30 U/L — SIGNIFICANT CHANGE UP (ref 4–32)
B PERT DNA SPEC QL NAA+PROBE: NOT DETECTED — SIGNIFICANT CHANGE UP
BASOPHILS # BLD AUTO: 0.05 K/UL — SIGNIFICANT CHANGE UP (ref 0–0.2)
BASOPHILS NFR BLD AUTO: 1.4 % — SIGNIFICANT CHANGE UP (ref 0–2)
BASOPHILS NFR SPEC: 1.8 % — SIGNIFICANT CHANGE UP (ref 0–2)
BILIRUB SERPL-MCNC: 1.7 MG/DL — HIGH (ref 0.2–1.2)
BLASTS # FLD: 0 % — SIGNIFICANT CHANGE UP (ref 0–0)
BLD GP AB SCN SERPL QL: POSITIVE — SIGNIFICANT CHANGE UP
BUN SERPL-MCNC: 12 MG/DL — SIGNIFICANT CHANGE UP (ref 7–23)
C PNEUM DNA SPEC QL NAA+PROBE: NOT DETECTED — SIGNIFICANT CHANGE UP
CALCIUM SERPL-MCNC: 9.6 MG/DL — SIGNIFICANT CHANGE UP (ref 8.4–10.5)
CHLORIDE SERPL-SCNC: 106 MMOL/L — SIGNIFICANT CHANGE UP (ref 98–107)
CO2 SERPL-SCNC: 24 MMOL/L — SIGNIFICANT CHANGE UP (ref 22–31)
CREAT SERPL-MCNC: 0.78 MG/DL — SIGNIFICANT CHANGE UP (ref 0.5–1.3)
EOSINOPHIL # BLD AUTO: 0.26 K/UL — SIGNIFICANT CHANGE UP (ref 0–0.5)
EOSINOPHIL NFR BLD AUTO: 7.2 % — HIGH (ref 0–6)
EOSINOPHIL NFR FLD: 10.5 % — HIGH (ref 0–6)
FLUAV H1 2009 PAND RNA SPEC QL NAA+PROBE: NOT DETECTED — SIGNIFICANT CHANGE UP
FLUAV H1 RNA SPEC QL NAA+PROBE: NOT DETECTED — SIGNIFICANT CHANGE UP
FLUAV H3 RNA SPEC QL NAA+PROBE: NOT DETECTED — SIGNIFICANT CHANGE UP
FLUAV SUBTYP SPEC NAA+PROBE: NOT DETECTED — SIGNIFICANT CHANGE UP
FLUBV RNA SPEC QL NAA+PROBE: NOT DETECTED — SIGNIFICANT CHANGE UP
GIANT PLATELETS BLD QL SMEAR: PRESENT — SIGNIFICANT CHANGE UP
GLUCOSE SERPL-MCNC: 90 MG/DL — SIGNIFICANT CHANGE UP (ref 70–99)
HADV DNA SPEC QL NAA+PROBE: NOT DETECTED — SIGNIFICANT CHANGE UP
HAPTOGLOB SERPL-MCNC: < 20 MG/DL — LOW (ref 34–200)
HCG SERPL-ACNC: < 5 MIU/ML — SIGNIFICANT CHANGE UP
HCOV PNL SPEC NAA+PROBE: SIGNIFICANT CHANGE UP
HCT VFR BLD CALC: 22.4 % — LOW (ref 34.5–45)
HGB BLD-MCNC: 7.5 G/DL — LOW (ref 11.5–15.5)
HMPV RNA SPEC QL NAA+PROBE: NOT DETECTED — SIGNIFICANT CHANGE UP
HPIV1 RNA SPEC QL NAA+PROBE: NOT DETECTED — SIGNIFICANT CHANGE UP
HPIV2 RNA SPEC QL NAA+PROBE: NOT DETECTED — SIGNIFICANT CHANGE UP
HPIV3 RNA SPEC QL NAA+PROBE: NOT DETECTED — SIGNIFICANT CHANGE UP
HPIV4 RNA SPEC QL NAA+PROBE: NOT DETECTED — SIGNIFICANT CHANGE UP
HYPOCHROMIA BLD QL: SIGNIFICANT CHANGE UP
IMM GRANULOCYTES NFR BLD AUTO: 0 % — SIGNIFICANT CHANGE UP (ref 0–1.5)
INR BLD: 1.16 — SIGNIFICANT CHANGE UP (ref 0.88–1.17)
LDH SERPL L TO P-CCNC: 272 U/L — HIGH (ref 135–225)
LYMPHOCYTES # BLD AUTO: 2.16 K/UL — SIGNIFICANT CHANGE UP (ref 1–3.3)
LYMPHOCYTES # BLD AUTO: 60 % — HIGH (ref 13–44)
LYMPHOCYTES NFR SPEC AUTO: 56.1 % — HIGH (ref 13–44)
MACROCYTES BLD QL: SIGNIFICANT CHANGE UP
MCHC RBC-ENTMCNC: 28.8 PG — SIGNIFICANT CHANGE UP (ref 27–34)
MCHC RBC-ENTMCNC: 33.5 % — SIGNIFICANT CHANGE UP (ref 32–36)
MCV RBC AUTO: 86.2 FL — SIGNIFICANT CHANGE UP (ref 80–100)
METAMYELOCYTES # FLD: 0 % — SIGNIFICANT CHANGE UP (ref 0–1)
MICROCYTES BLD QL: SLIGHT — SIGNIFICANT CHANGE UP
MONOCYTES # BLD AUTO: 0.31 K/UL — SIGNIFICANT CHANGE UP (ref 0–0.9)
MONOCYTES NFR BLD AUTO: 8.6 % — SIGNIFICANT CHANGE UP (ref 2–14)
MONOCYTES NFR BLD: 2.6 % — SIGNIFICANT CHANGE UP (ref 2–9)
MYELOCYTES NFR BLD: 0 % — SIGNIFICANT CHANGE UP (ref 0–0)
NEUTROPHIL AB SER-ACNC: 24.6 % — LOW (ref 43–77)
NEUTROPHILS # BLD AUTO: 0.82 K/UL — LOW (ref 1.8–7.4)
NEUTROPHILS NFR BLD AUTO: 22.8 % — LOW (ref 43–77)
NEUTS BAND # BLD: 0 % — SIGNIFICANT CHANGE UP (ref 0–6)
NRBC # BLD: 3 /100WBC — SIGNIFICANT CHANGE UP
NRBC # FLD: 0.09 K/UL — LOW (ref 25–125)
NRBC FLD-RTO: 2.5 — SIGNIFICANT CHANGE UP
OTHER - HEMATOLOGY %: 1.8 — SIGNIFICANT CHANGE UP
OVALOCYTES BLD QL SMEAR: SLIGHT — SIGNIFICANT CHANGE UP
PLATELET # BLD AUTO: 158 K/UL — SIGNIFICANT CHANGE UP (ref 150–400)
PLATELET COUNT - ESTIMATE: NORMAL — SIGNIFICANT CHANGE UP
PMV BLD: 9 FL — SIGNIFICANT CHANGE UP (ref 7–13)
POTASSIUM SERPL-MCNC: 3.8 MMOL/L — SIGNIFICANT CHANGE UP (ref 3.5–5.3)
POTASSIUM SERPL-SCNC: 3.8 MMOL/L — SIGNIFICANT CHANGE UP (ref 3.5–5.3)
PROMYELOCYTES # FLD: 0 % — SIGNIFICANT CHANGE UP (ref 0–0)
PROT SERPL-MCNC: 8 G/DL — SIGNIFICANT CHANGE UP (ref 6–8.3)
PROTHROM AB SERPL-ACNC: 13.3 SEC — HIGH (ref 9.8–13.1)
RBC # BLD: 2.6 M/UL — LOW (ref 3.8–5.2)
RBC # FLD: 14.4 % — SIGNIFICANT CHANGE UP (ref 10.3–14.5)
RETICS #: 69 K/UL — SIGNIFICANT CHANGE UP (ref 25–125)
RETICS/RBC NFR: 2.7 % — HIGH (ref 0.5–2.5)
RH IG SCN BLD-IMP: POSITIVE — SIGNIFICANT CHANGE UP
RSV RNA SPEC QL NAA+PROBE: NOT DETECTED — SIGNIFICANT CHANGE UP
RV+EV RNA SPEC QL NAA+PROBE: NOT DETECTED — SIGNIFICANT CHANGE UP
SMUDGE CELLS # BLD: PRESENT — SIGNIFICANT CHANGE UP
SODIUM SERPL-SCNC: 141 MMOL/L — SIGNIFICANT CHANGE UP (ref 135–145)
TARGETS BLD QL SMEAR: SIGNIFICANT CHANGE UP
VARIANT LYMPHS # BLD: 2.6 % — SIGNIFICANT CHANGE UP
WBC # BLD: 3.6 K/UL — LOW (ref 3.8–10.5)
WBC # FLD AUTO: 3.6 K/UL — LOW (ref 3.8–10.5)

## 2019-03-15 PROCEDURE — 71046 X-RAY EXAM CHEST 2 VIEWS: CPT | Mod: 26

## 2019-03-15 RX ORDER — DIPHENHYDRAMINE HCL 50 MG
50 CAPSULE ORAL ONCE
Qty: 0 | Refills: 0 | Status: COMPLETED | OUTPATIENT
Start: 2019-03-15 | End: 2019-03-15

## 2019-03-15 RX ORDER — MORPHINE SULFATE 50 MG/1
8 CAPSULE, EXTENDED RELEASE ORAL ONCE
Qty: 0 | Refills: 0 | Status: DISCONTINUED | OUTPATIENT
Start: 2019-03-15 | End: 2019-03-15

## 2019-03-15 RX ORDER — SODIUM CHLORIDE 9 MG/ML
1000 INJECTION, SOLUTION INTRAVENOUS
Qty: 0 | Refills: 0 | Status: DISCONTINUED | OUTPATIENT
Start: 2019-03-15 | End: 2019-03-17

## 2019-03-15 RX ADMIN — Medication 50 MILLIGRAM(S): at 21:08

## 2019-03-15 RX ADMIN — SODIUM CHLORIDE 120 MILLILITER(S): 9 INJECTION, SOLUTION INTRAVENOUS at 21:08

## 2019-03-15 RX ADMIN — MORPHINE SULFATE 8 MILLIGRAM(S): 50 CAPSULE, EXTENDED RELEASE ORAL at 21:02

## 2019-03-15 RX ADMIN — MORPHINE SULFATE 8 MILLIGRAM(S): 50 CAPSULE, EXTENDED RELEASE ORAL at 22:05

## 2019-03-15 NOTE — ED ADULT NURSE NOTE - OBJECTIVE STATEMENT
42 y/o female, a&ox4, ambulatory. Pt c/o sickle cell crisis. Pain started two weeks ago but was managing with home pain meds. pt ran out of pain medication, pain got worse and pt came to ED. Pain in head, chest, back, legs bilaterally. Pt reports low grade fever at home. pt has hx of acute chest. pt denies SOB or chest pain. unable to gain IV access. US guided IV requested.

## 2019-03-15 NOTE — ED ADULT NURSE NOTE - NSIMPLEMENTINTERV_GEN_ALL_ED
Implemented All Universal Safety Interventions:  [place] to call system. Call bell, personal items and telephone within reach. Instruct patient to call for assistance. Room bathroom lighting operational. Non-slip footwear when patient is off stretcher. Physically safe environment: no spills, clutter or unnecessary equipment. Stretcher in lowest position, wheels locked, appropriate side rails in place.

## 2019-03-15 NOTE — ED ADULT TRIAGE NOTE - CHIEF COMPLAINT QUOTE
pt amb to triage c/o leg/head/chest pain x 2 weeks off and on, Hx SCC, MS-contin morphine @ home w/ minimal relief, no neuro deficits noted, previous episodes of acute chest

## 2019-03-15 NOTE — ED PROVIDER NOTE - ATTENDING CONTRIBUTION TO CARE
I was physically present for the E/M service provided. I agree with above history, physical, and plan which I have reviewed and edited where appropriate. I was physically present for the key portions of the service provided.    Kang: 42 yo F PMH sickle cell disease (per documentation, has history of acute chest syndrome), hospitalized in Dec for pain crisis, presenting with HA, back pain and LE pain x 2 weeks, worse this AM. tried fentanyl patch, ms contin without relieve. no fever, no neck stiffness, no photophobia, no rashes, no dysuria. throbbing headache. no focal weakness.    *GEN:   comfortable, in no apparent distress, AOx3  *EYES:   PERRL, extra-occular movements intact  *HEENT:   airway patent, moist mucosal membranes, uvula midline  *CV:   regular rate and rhythm, normal S1/S2, no murmur  *RESP:   clear to auscultation bilaterally, non-labored, speaking in full sentences  *ABD:   soft, non tender, no guarding  *MSK:   no musculoskeletal tenderness, 5/5 strength, moving all extremity  *SKIN:   dry, intact, no rash  *NEURO:   AOx3, no focal weakness or loss of sensation, gait normal, GCS 15, CN II-XII    a/p: likely sickle cell pain crisis r/o acute chest vs uti vs viral uri.  neurologically stable which goes against cva.  will check lytes imbalance. give pain meds, check labs, reti count, ua, upreg, admit if unable to control pain.

## 2019-03-15 NOTE — ED PROVIDER NOTE - CLINICAL SUMMARY MEDICAL DECISION MAKING FREE TEXT BOX
Ashlee Ohara MD PGY-1 Pt is a 42 yo F PMH sickle cell disease (per documentation, has history of acute chest syndrome), hospitalized in Dec for pain crisis, presenting with HA, back pain and LE pain x 2 weeks, worse this AM. Rectal temp 100.4, concern for pain crisis vs acute chest. Labs, pain control, fluids, blood and urine culture/sepsis work up, retic count, rvp, chest xray. likely admit for pain control

## 2019-03-15 NOTE — ED PROVIDER NOTE - PHYSICAL EXAMINATION
PHYSICAL EXAM:   General: tearful, appears stated age,   HEENT: NC/AT, EOMI  Cardiovascular: regular rate and rhythm, + S1/S2, no murmurs, rubs, gallops appreciated  Respiratory: clear to auscultation bilaterally, good aeration bilaterally, nonlabored respirations  Abdominal: soft, nontender, nondistended, no rebound, guarding or rigidity, +bowel sounds, +firm fibroid uterus  Extremities: no LE edema b/l. B/l knee pain and ttp but no edema, erythema  Neuro: Alert and oriented x3. CN2-12 intact, Strength 5/5 in upper and lower extremities. Sensation intact to light touch in upper and lower extremities. finger-to-nose  Psychiatric: appears tearful   Skin: appropriate color, warm, dry.   -Ashlee Ohara PGY-1

## 2019-03-15 NOTE — ED PROVIDER NOTE - PROGRESS NOTE DETAILS
Kang: pt still in pain after 2 rounds of morphine 8 mg. h/h stable and reti count stable.  pending ua.  if pain unable to control will admit. s/o to Dr Juarez Ashlee Ohara MD PGY-1 Pt with recurrent pain despite multiple rounds of morphine (cannot get dilaudid as it worsens HA). chest xray with no focal consolidations. will admit for pain control

## 2019-03-16 DIAGNOSIS — D57.00 HB-SS DISEASE WITH CRISIS, UNSPECIFIED: ICD-10-CM

## 2019-03-16 DIAGNOSIS — R51 HEADACHE: ICD-10-CM

## 2019-03-16 DIAGNOSIS — Z29.9 ENCOUNTER FOR PROPHYLACTIC MEASURES, UNSPECIFIED: ICD-10-CM

## 2019-03-16 DIAGNOSIS — D64.9 ANEMIA, UNSPECIFIED: ICD-10-CM

## 2019-03-16 PROBLEM — E55.9 VITAMIN D DEFICIENCY, UNSPECIFIED: Chronic | Status: ACTIVE | Noted: 2018-12-08

## 2019-03-16 PROBLEM — F17.200 NICOTINE DEPENDENCE, UNSPECIFIED, UNCOMPLICATED: Chronic | Status: ACTIVE | Noted: 2018-12-08

## 2019-03-16 LAB — SPECIMEN SOURCE: SIGNIFICANT CHANGE UP

## 2019-03-16 PROCEDURE — 99223 1ST HOSP IP/OBS HIGH 75: CPT | Mod: AI

## 2019-03-16 RX ORDER — MORPHINE SULFATE 50 MG/1
8 CAPSULE, EXTENDED RELEASE ORAL ONCE
Qty: 0 | Refills: 0 | Status: DISCONTINUED | OUTPATIENT
Start: 2019-03-16 | End: 2019-03-16

## 2019-03-16 RX ORDER — ACETAMINOPHEN 500 MG
650 TABLET ORAL EVERY 6 HOURS
Qty: 0 | Refills: 0 | Status: DISCONTINUED | OUTPATIENT
Start: 2019-03-16 | End: 2019-03-17

## 2019-03-16 RX ORDER — INFLUENZA VIRUS VACCINE 15; 15; 15; 15 UG/.5ML; UG/.5ML; UG/.5ML; UG/.5ML
0.5 SUSPENSION INTRAMUSCULAR ONCE
Qty: 0 | Refills: 0 | Status: DISCONTINUED | OUTPATIENT
Start: 2019-03-16 | End: 2019-03-17

## 2019-03-16 RX ORDER — HYDROXYUREA 500 MG/1
1000 CAPSULE ORAL
Qty: 0 | Refills: 0 | Status: DISCONTINUED | OUTPATIENT
Start: 2019-03-16 | End: 2019-03-17

## 2019-03-16 RX ORDER — FENTANYL CITRATE 50 UG/ML
1 INJECTION INTRAVENOUS
Qty: 0 | Refills: 0 | Status: DISCONTINUED | OUTPATIENT
Start: 2019-03-16 | End: 2019-03-17

## 2019-03-16 RX ORDER — ENOXAPARIN SODIUM 100 MG/ML
40 INJECTION SUBCUTANEOUS EVERY 24 HOURS
Qty: 0 | Refills: 0 | Status: DISCONTINUED | OUTPATIENT
Start: 2019-03-16 | End: 2019-03-17

## 2019-03-16 RX ORDER — ONDANSETRON 8 MG/1
4 TABLET, FILM COATED ORAL EVERY 6 HOURS
Qty: 0 | Refills: 0 | Status: DISCONTINUED | OUTPATIENT
Start: 2019-03-16 | End: 2019-03-17

## 2019-03-16 RX ORDER — DOCUSATE SODIUM 100 MG
100 CAPSULE ORAL THREE TIMES A DAY
Qty: 0 | Refills: 0 | Status: DISCONTINUED | OUTPATIENT
Start: 2019-03-16 | End: 2019-03-17

## 2019-03-16 RX ORDER — NALOXONE HYDROCHLORIDE 4 MG/.1ML
0.1 SPRAY NASAL
Qty: 0 | Refills: 0 | Status: DISCONTINUED | OUTPATIENT
Start: 2019-03-16 | End: 2019-03-17

## 2019-03-16 RX ORDER — HYDROMORPHONE HYDROCHLORIDE 2 MG/ML
30 INJECTION INTRAMUSCULAR; INTRAVENOUS; SUBCUTANEOUS
Qty: 0 | Refills: 0 | Status: DISCONTINUED | OUTPATIENT
Start: 2019-03-16 | End: 2019-03-17

## 2019-03-16 RX ORDER — SENNA PLUS 8.6 MG/1
2 TABLET ORAL AT BEDTIME
Qty: 0 | Refills: 0 | Status: DISCONTINUED | OUTPATIENT
Start: 2019-03-16 | End: 2019-03-17

## 2019-03-16 RX ORDER — FOLIC ACID 0.8 MG
1 TABLET ORAL DAILY
Qty: 0 | Refills: 0 | Status: DISCONTINUED | OUTPATIENT
Start: 2019-03-16 | End: 2019-03-17

## 2019-03-16 RX ADMIN — MORPHINE SULFATE 8 MILLIGRAM(S): 50 CAPSULE, EXTENDED RELEASE ORAL at 00:57

## 2019-03-16 RX ADMIN — HYDROMORPHONE HYDROCHLORIDE 30 MILLILITER(S): 2 INJECTION INTRAMUSCULAR; INTRAVENOUS; SUBCUTANEOUS at 12:08

## 2019-03-16 RX ADMIN — MORPHINE SULFATE 8 MILLIGRAM(S): 50 CAPSULE, EXTENDED RELEASE ORAL at 01:38

## 2019-03-16 RX ADMIN — HYDROXYUREA 1000 MILLIGRAM(S): 500 CAPSULE ORAL at 18:31

## 2019-03-16 RX ADMIN — MORPHINE SULFATE 8 MILLIGRAM(S): 50 CAPSULE, EXTENDED RELEASE ORAL at 05:15

## 2019-03-16 RX ADMIN — FENTANYL CITRATE 1 PATCH: 50 INJECTION INTRAVENOUS at 18:32

## 2019-03-16 RX ADMIN — MORPHINE SULFATE 8 MILLIGRAM(S): 50 CAPSULE, EXTENDED RELEASE ORAL at 09:18

## 2019-03-16 RX ADMIN — MORPHINE SULFATE 8 MILLIGRAM(S): 50 CAPSULE, EXTENDED RELEASE ORAL at 09:03

## 2019-03-16 RX ADMIN — Medication 1 MILLIGRAM(S): at 12:13

## 2019-03-16 RX ADMIN — FENTANYL CITRATE 1 PATCH: 50 INJECTION INTRAVENOUS at 10:20

## 2019-03-16 RX ADMIN — HYDROMORPHONE HYDROCHLORIDE 30 MILLILITER(S): 2 INJECTION INTRAMUSCULAR; INTRAVENOUS; SUBCUTANEOUS at 20:29

## 2019-03-16 RX ADMIN — SENNA PLUS 2 TABLET(S): 8.6 TABLET ORAL at 21:47

## 2019-03-16 RX ADMIN — ENOXAPARIN SODIUM 40 MILLIGRAM(S): 100 INJECTION SUBCUTANEOUS at 10:20

## 2019-03-16 RX ADMIN — Medication 100 MILLIGRAM(S): at 21:47

## 2019-03-16 RX ADMIN — Medication 100 MILLIGRAM(S): at 14:00

## 2019-03-16 RX ADMIN — MORPHINE SULFATE 8 MILLIGRAM(S): 50 CAPSULE, EXTENDED RELEASE ORAL at 04:51

## 2019-03-16 NOTE — H&P ADULT - NSICDXPROBLEM_GEN_ALL_CORE_FT
PROBLEM DIAGNOSES  Problem: Sickle cell crisis  Assessment and Plan: IVF hydration with 1/2 NS  c/w folic acid and hydrea.  C/w Fentanyl patch 50 mcg/72 hrs.  Start Dilaudid PCA ( morphine pCA in short supply- d/w pharmacy)  monitor labs cbc/bmp/ldh daily  Hematology consult if patient decompensates.    Problem: Headache  Assessment and Plan: Ha typical of SS crisis- No CP /sob photophobia or nuchal rigidity or fevers.  tylenol prn HA    Problem: Prophylactic measure  Assessment and Plan: Sec to SS  dz. [place] labs. If Hbg <7 , consult with Hematology before any transfusions    Problem: Chronic anemia  Assessment and Plan: Lovenox Sq for DVT proph PROBLEM DIAGNOSES  Problem: Sickle cell crisis  Assessment and Plan: IVF hydration with 1/2 NS  c/w folic acid and hydrea.  C/w Fentanyl patch 50 mcg/72 hrs.  Start Dilaudid PCA ( morphine pCA in short supply- d/w pharmacy)  monitor labs cbc/bmp/ldh daily  Hematology consult if patient decompensates.    Problem: Headache  Assessment and Plan: Ha typical of SS crisis- No CP /sob photophobia or nuchal rigidity or fevers.  tylenol prn HA    Problem: Chronic anemia  Assessment and Plan: Sec to Ssdz. Monitor labs. if hbg <7, consult Hematology before PRBC transfusions.    Problem: Prophylactic measure  Assessment and Plan: DVT prop- lovenox SQ

## 2019-03-16 NOTE — H&P ADULT - NSHPREVIEWOFSYSTEMS_GEN_ALL_CORE
CONSTITUTIONAL: No fever, weight loss, or fatigue  EYES: No eye pain, visual disturbances, or discharge  ENMT:  No difficulty hearing, tinnitus, vertigo; No sinus or throat pain  NECK: No pain or stiffness  BREASTS: No pain, masses, or nipple discharge  RESPIRATORY: No cough, wheezing, chills or hemoptysis; No shortness of breath  CARDIOVASCULAR: No chest pain, palpitations, dizziness, or leg swelling  GASTROINTESTINAL: No abdominal or epigastric pain. No nausea, vomiting, or hematemesis; No diarrhea or constipation. No melena or hematochezia.  GENITOURINARY: No dysuria, frequency, hematuria, or incontinence  NEUROLOGICAL: POSITIVE headaches, memory loss, loss of strength, numbness, or tremors  SKIN: No itching, burning, rashes, or lesions   LYMPH NODES: No enlarged glands  ENDOCRINE: No heat or cold intolerance; No hair loss  MUSCULOSKELETAL: POSITIVE Back pain and Leg Pain  PSYCHIATRIC: No depression, anxiety, mood swings, or difficulty sleeping  HEME/LYMPH: No easy bruising, or bleeding gums  ALLERGY AND IMMUNOLOGIC: No hives or eczema

## 2019-03-16 NOTE — H&P ADULT - NSHPLABSRESULTS_GEN_ALL_CORE
LABS:                        7.5    3.60  )-----------( 158      ( 15 Mar 2019 20:31 )             22.4     03-15    141  |  106  |  12  ----------------------------<  90  3.8   |  24  |  0.78    Ca    9.6      15 Mar 2019 20:31    TPro  8.0  /  Alb  4.4  /  TBili  1.7<H>  /  DBili  x   /  AST  30  /  ALT  9   /  AlkPhos  57  03-15      PT/INR - ( 15 Mar 2019 20:31 )   PT: 13.3 SEC;   INR: 1.16          PTT - ( 15 Mar 2019 20:31 )  PTT:34.8 SEC      RADIOLOGY & ADDITIONAL TESTS: LABS:                        7.5    3.60  )-----------( 158      ( 15 Mar 2019 20:31 )             22.4     03-15    141  |  106  |  12  ----------------------------<  90  3.8   |  24  |  0.78    Ca    9.6      15 Mar 2019 20:31    TPro  8.0  /  Alb  4.4  /  TBili  1.7<H>  /  DBili  x   /  AST  30  /  ALT  9   /  AlkPhos  57  03-15      PT/INR - ( 15 Mar 2019 20:31 )   PT: 13.3 SEC;   INR: 1.16          PTT - ( 15 Mar 2019 20:31 )  PTT:34.8 SEC      RADIOLOGY & ADDITIONAL TESTS:  < from: Xray Chest 2 Views PA/Lat (03.15.19 @ 20:47) >  IMPRESSION:   Clear lungs.

## 2019-03-16 NOTE — H&P ADULT - HISTORY OF PRESENT ILLNESS
42 yo F h/o Ss dz c/o upper back pain, HA and leg pain that is typical of her SS crisis.   Pain is 8/10 and has improved with IV morphine given in ED.  Patient notes No CP /sob / Abd pain/ N/V/D/C or fevers or dysuria.  Tellez is all over and typical of SS crisis- No CP /sob photophobia fever or nuchal rigidity.

## 2019-03-16 NOTE — H&P ADULT - NSHPPHYSICALEXAM_GEN_ALL_CORE
Vital Signs Last 24 Hrs  T(C): 36.9 (16 Mar 2019 06:52), Max: 38 (15 Mar 2019 19:31)  T(F): 98.4 (16 Mar 2019 06:52), Max: 100.4 (15 Mar 2019 19:31)  HR: 70 (16 Mar 2019 06:52) (64 - 83)  BP: 98/58 (16 Mar 2019 06:52) (98/58 - 167/65)  BP(mean): --  RR: 18 (16 Mar 2019 06:52) (16 - 18)  SpO2: 100% (16 Mar 2019 06:52) (100% - 100%)      PHYSICAL EXAM:  GENERAL: NAD, well-developed  HEAD:  Atraumatic, Normocephalic  EYES: EOMI, PERRLA, conjunctiva and sclera clear  NECK: Supple, no JVD  CHEST/LUNG: Clear to auscultation bilaterally; no wheeze  HEART: Regular rate and rhythm; no murmurs, rubs, or gallops  ABDOMEN: Soft, Nontender, Nondistended; Bowel sounds present  EXTREMITIES:  warm and well perfused, no clubbing, cyanosis, or edema  PSYCH: AAOx3  NEUROLOGY: non-focal  SKIN: no rashes or lesions Vital Signs Last 24 Hrs  T(C): 36.9 (16 Mar 2019 06:52), Max: 38 (15 Mar 2019 19:31)  T(F): 98.4 (16 Mar 2019 06:52), Max: 100.4 (15 Mar 2019 19:31)  HR: 70 (16 Mar 2019 06:52) (64 - 83)  BP: 98/58 (16 Mar 2019 06:52) (98/58 - 167/65)  BP(mean): --  RR: 18 (16 Mar 2019 06:52) (16 - 18)  SpO2: 100% (16 Mar 2019 06:52) (100% - 100%)      PHYSICAL EXAM:  GENERAL: NAD, well-developed  HEAD:  Atraumatic, Normocephalic  EYES: EOMI, PERRLA, conjunctiva and sclera clear  NECK: Supple, no JVD  No nuchal rigidity  CHEST/LUNG: Clear to auscultation bilaterally; no wheeze  HEART: Regular rate and rhythm; no murmurs, rubs, or gallops  ABDOMEN: Soft, Nontender, Nondistended; Bowel sounds present  EXTREMITIES:  warm and well perfused, no clubbing, cyanosis, or edema  PSYCH: AAOx3  NEUROLOGY: non-focal  SKIN: no rashes or lesions

## 2019-03-16 NOTE — H&P ADULT - ASSESSMENT
44 yo F h/o Ss dz c/o upper back pain, HA and leg pain that is typical of her SS crisis.   Pain is 8/10 and has improved with IV morphine given in ED.  Patient notes No CP /sob / Abd pain/ N/V/D/C or fevers or dysuria.  Tellez is all over and typical of SS crisis- No CP /sob photophobia fever or nuchal rigidity. 42 yo F h/o Ss dz c/o upper back pain, HA and leg pain that is typical of her SS crisis.   Pain is 8/10 and has improved with IV morphine given in ED.  Patient notes No CP /sob / Abd pain/ N/V/D/C or fevers or dysuria.  Tellez is all over and typical of SS crisis- No CP /sob photophobia fever or nuchal rigidity.  Adm with SS crisis with dehydration and HA and upper back pain c/w SS crisis

## 2019-03-16 NOTE — H&P ADULT - NSICDXFAMILYHX_GEN_ALL_CORE_FT
FAMILY HISTORY:  FH: leukemia, father    Child  Still living? Unknown  Family history of sickle cell trait, Age at diagnosis: Age Unknown  Family history of sickle cell trait, Age at diagnosis: Age Unknown

## 2019-03-16 NOTE — H&P ADULT - NSICDXPASTMEDICALHX_GEN_ALL_CORE_FT
PAST MEDICAL HISTORY:  Acute chest syndrome     H/O sickle cell disease     Hb-SS disease with crisis     Smoking ~ quit ~ 2012    Vitamin D deficiency

## 2019-03-16 NOTE — ED ADULT NURSE REASSESSMENT NOTE - NS ED NURSE REASSESS COMMENT FT1
Break Coverage Rn: patient resting, currently endorsing pain to back, knees, head. Med given as per MAR. Will ctm.
Received report from break RN, Pt at baseline, medicated by break RN for pain as ordered. Pt appears comfortable, appears in NAD, will continue to monitor.
DISCHARGE

## 2019-03-17 ENCOUNTER — TRANSCRIPTION ENCOUNTER (OUTPATIENT)
Age: 44
End: 2019-03-17

## 2019-03-17 VITALS
DIASTOLIC BLOOD PRESSURE: 50 MMHG | TEMPERATURE: 98 F | HEART RATE: 84 BPM | RESPIRATION RATE: 18 BRPM | OXYGEN SATURATION: 99 % | SYSTOLIC BLOOD PRESSURE: 100 MMHG

## 2019-03-17 DIAGNOSIS — D57.00 HB-SS DISEASE WITH CRISIS, UNSPECIFIED: ICD-10-CM

## 2019-03-17 DIAGNOSIS — Z29.9 ENCOUNTER FOR PROPHYLACTIC MEASURES, UNSPECIFIED: ICD-10-CM

## 2019-03-17 DIAGNOSIS — R51 HEADACHE: ICD-10-CM

## 2019-03-17 LAB
ANION GAP SERPL CALC-SCNC: 9 MMO/L — SIGNIFICANT CHANGE UP (ref 7–14)
BUN SERPL-MCNC: 6 MG/DL — LOW (ref 7–23)
CALCIUM SERPL-MCNC: 9.2 MG/DL — SIGNIFICANT CHANGE UP (ref 8.4–10.5)
CHLORIDE SERPL-SCNC: 106 MMOL/L — SIGNIFICANT CHANGE UP (ref 98–107)
CO2 SERPL-SCNC: 24 MMOL/L — SIGNIFICANT CHANGE UP (ref 22–31)
CREAT SERPL-MCNC: 0.82 MG/DL — SIGNIFICANT CHANGE UP (ref 0.5–1.3)
GLUCOSE SERPL-MCNC: 90 MG/DL — SIGNIFICANT CHANGE UP (ref 70–99)
HCT VFR BLD CALC: 22.2 % — LOW (ref 34.5–45)
HGB BLD-MCNC: 7.4 G/DL — LOW (ref 11.5–15.5)
LDH SERPL L TO P-CCNC: 242 U/L — HIGH (ref 135–225)
MCHC RBC-ENTMCNC: 28.6 PG — SIGNIFICANT CHANGE UP (ref 27–34)
MCHC RBC-ENTMCNC: 33.3 % — SIGNIFICANT CHANGE UP (ref 32–36)
MCV RBC AUTO: 85.7 FL — SIGNIFICANT CHANGE UP (ref 80–100)
NRBC # FLD: 0.02 K/UL — LOW (ref 25–125)
PLATELET # BLD AUTO: 141 K/UL — LOW (ref 150–400)
PMV BLD: 9 FL — SIGNIFICANT CHANGE UP (ref 7–13)
POTASSIUM SERPL-MCNC: 4.4 MMOL/L — SIGNIFICANT CHANGE UP (ref 3.5–5.3)
POTASSIUM SERPL-SCNC: 4.4 MMOL/L — SIGNIFICANT CHANGE UP (ref 3.5–5.3)
RBC # BLD: 2.59 M/UL — LOW (ref 3.8–5.2)
RBC # FLD: 14.8 % — HIGH (ref 10.3–14.5)
SODIUM SERPL-SCNC: 139 MMOL/L — SIGNIFICANT CHANGE UP (ref 135–145)
WBC # BLD: 2.96 K/UL — LOW (ref 3.8–10.5)
WBC # FLD AUTO: 2.96 K/UL — LOW (ref 3.8–10.5)

## 2019-03-17 PROCEDURE — 99239 HOSP IP/OBS DSCHRG MGMT >30: CPT

## 2019-03-17 RX ADMIN — Medication 1 MILLIGRAM(S): at 13:36

## 2019-03-17 RX ADMIN — HYDROXYUREA 1000 MILLIGRAM(S): 500 CAPSULE ORAL at 06:21

## 2019-03-17 RX ADMIN — ENOXAPARIN SODIUM 40 MILLIGRAM(S): 100 INJECTION SUBCUTANEOUS at 13:36

## 2019-03-17 RX ADMIN — Medication 100 MILLIGRAM(S): at 13:36

## 2019-03-17 RX ADMIN — Medication 100 MILLIGRAM(S): at 06:21

## 2019-03-17 RX ADMIN — HYDROMORPHONE HYDROCHLORIDE 30 MILLILITER(S): 2 INJECTION INTRAMUSCULAR; INTRAVENOUS; SUBCUTANEOUS at 08:19

## 2019-03-17 RX ADMIN — FENTANYL CITRATE 1 PATCH: 50 INJECTION INTRAVENOUS at 06:21

## 2019-03-17 NOTE — DISCHARGE NOTE PROVIDER - CARE PROVIDER_API CALL
Estrella Salazar)  Hematology; Medical Oncology  [address]  [place] [address]  Phone: [phone]  Fax: [phone]  Follow Up Time:

## 2019-03-17 NOTE — PROGRESS NOTE ADULT - NSICDXPROBLEM_GEN_ALL_CORE_FT
PROBLEM DIAGNOSES  Problem: Sickle cell crisis  Assessment and Plan:     Problem: Headache  Assessment and Plan: Pt states that is like the typical pain she gets with sickle cell crisis   Continue management with pca pump    Problem: Prophylactic measure  Assessment and Plan: DVT prop- lovenox SQ

## 2019-03-17 NOTE — PROGRESS NOTE ADULT - ASSESSMENT
42 yo F h/o Ss dz c/o upper back pain, headache and leg pain that is typical of her SS crisis. Pain was 8/10 and had improved with PCA.      Sickle cell crisis [D57.00]  Was on PCA and IV hydration  Will continue home meds on discharge  Patient to follow up with her Primary care doctor/Hematologist with 1 week of discharge.    Headache [R51]  Pt states that is like the typical pain she gets with sickle cell crisis   Continue management with pca pump      Prophylactic measure [Z29.9]  DVT prop- lovenox SQ

## 2019-03-17 NOTE — DISCHARGE NOTE PROVIDER - NSDCCPCAREPLAN_GEN_ALL_CORE_FT
PRINCIPAL DISCHARGE DIAGNOSIS  Diagnosis: Sickle cell crisis  Assessment and Plan of Treatment: follow up with your Primary care doctor/Hematologist with 1 week of discharge. Call to schedule an appointment      SECONDARY DISCHARGE DIAGNOSES  Diagnosis: Pain crisis  Assessment and Plan of Treatment: continue pain medication as ordered

## 2019-03-17 NOTE — DISCHARGE NOTE NURSING/CASE MANAGEMENT/SOCIAL WORK - NSDCDPATPORTLINK_GEN_ALL_CORE
You can access the [facility] Portal, offered by [facility], by registering with the following website: [email or web address]

## 2019-03-17 NOTE — DISCHARGE NOTE PROVIDER - HOSPITAL COURSE
42 yo F h/o Ss dz c/o upper back pain, HA and leg pain that is typical of her SS crisis. Pt admitted with sickle cell crisis. CXR-neg, Bcx-neg, Pt treated with PCA dilaudid, IVF hydration, Fentanyl patch, hydroxyurea continued. Pt hgb stable at 7.4, at baseline. No transfusion needed. Pt pain improved. Pt stable for discharge home. Pt to f/u with PCP/Hematologist

## 2019-03-18 LAB
BASE EXCESS BLDV CALC-SCNC: 2.6 MMOL/L — SIGNIFICANT CHANGE UP
BLOOD GAS VENOUS - CREATININE: 0.77 MG/DL — SIGNIFICANT CHANGE UP (ref 0.5–1.3)
CHLORIDE BLDV-SCNC: 108 MMOL/L — SIGNIFICANT CHANGE UP (ref 96–108)
GAS PNL BLDV: 137 MMOL/L — SIGNIFICANT CHANGE UP (ref 136–146)
GLUCOSE BLDV-MCNC: 91 — SIGNIFICANT CHANGE UP (ref 70–99)
HCO3 BLDV-SCNC: 26 MMOL/L — SIGNIFICANT CHANGE UP (ref 20–27)
HCT VFR BLDV CALC: 24.4 % — LOW (ref 34.5–45)
HGB BLDV-MCNC: 7.8 G/DL — LOW (ref 11.5–15.5)
LACTATE BLDV-MCNC: 0.8 MMOL/L — SIGNIFICANT CHANGE UP (ref 0.5–2)
PCO2 BLDV: 48 MMHG — SIGNIFICANT CHANGE UP (ref 41–51)
PH BLDV: 7.38 PH — SIGNIFICANT CHANGE UP (ref 7.32–7.43)
PO2 BLDV: 50 MMHG — HIGH (ref 35–40)
POTASSIUM BLDV-SCNC: 3.6 MMOL/L — SIGNIFICANT CHANGE UP (ref 3.4–4.5)
SAO2 % BLDV: 82.1 % — SIGNIFICANT CHANGE UP (ref 60–85)

## 2019-03-20 LAB — BACTERIA BLD CULT: SIGNIFICANT CHANGE UP

## 2019-06-28 ENCOUNTER — INPATIENT (INPATIENT)
Facility: HOSPITAL | Age: 44
LOS: 1 days | Discharge: HOME | End: 2019-06-30
Attending: HOSPITALIST | Admitting: HOSPITALIST
Payer: MEDICAID

## 2019-06-28 VITALS
RESPIRATION RATE: 18 BRPM | TEMPERATURE: 98 F | SYSTOLIC BLOOD PRESSURE: 120 MMHG | HEART RATE: 73 BPM | OXYGEN SATURATION: 100 % | DIASTOLIC BLOOD PRESSURE: 56 MMHG

## 2019-06-28 DIAGNOSIS — D57.00 HB-SS DISEASE WITH CRISIS, UNSPECIFIED: ICD-10-CM

## 2019-06-28 DIAGNOSIS — Z98.890 OTHER SPECIFIED POSTPROCEDURAL STATES: Chronic | ICD-10-CM

## 2019-06-28 DIAGNOSIS — F51.02 ADJUSTMENT INSOMNIA: ICD-10-CM

## 2019-06-28 DIAGNOSIS — E55.9 VITAMIN D DEFICIENCY, UNSPECIFIED: ICD-10-CM

## 2019-06-28 DIAGNOSIS — Z98.891 HISTORY OF UTERINE SCAR FROM PREVIOUS SURGERY: Chronic | ICD-10-CM

## 2019-06-28 DIAGNOSIS — R07.89 OTHER CHEST PAIN: ICD-10-CM

## 2019-06-28 DIAGNOSIS — G44.209 TENSION-TYPE HEADACHE, UNSPECIFIED, NOT INTRACTABLE: ICD-10-CM

## 2019-06-28 LAB
ALBUMIN SERPL ELPH-MCNC: 4.4 G/DL — SIGNIFICANT CHANGE UP (ref 3.3–5)
ALP SERPL-CCNC: 56 U/L — SIGNIFICANT CHANGE UP (ref 40–120)
ALT FLD-CCNC: 9 U/L — SIGNIFICANT CHANGE UP (ref 4–33)
ANION GAP SERPL CALC-SCNC: 12 MMO/L — SIGNIFICANT CHANGE UP (ref 7–14)
AST SERPL-CCNC: 30 U/L — SIGNIFICANT CHANGE UP (ref 4–32)
BASOPHILS # BLD AUTO: 0.03 K/UL — SIGNIFICANT CHANGE UP (ref 0–0.2)
BASOPHILS NFR BLD AUTO: 0.8 % — SIGNIFICANT CHANGE UP (ref 0–2)
BILIRUB SERPL-MCNC: 1.7 MG/DL — HIGH (ref 0.2–1.2)
BUN SERPL-MCNC: 9 MG/DL — SIGNIFICANT CHANGE UP (ref 7–23)
CALCIUM SERPL-MCNC: 9.4 MG/DL — SIGNIFICANT CHANGE UP (ref 8.4–10.5)
CHLORIDE SERPL-SCNC: 103 MMOL/L — SIGNIFICANT CHANGE UP (ref 98–107)
CO2 SERPL-SCNC: 22 MMOL/L — SIGNIFICANT CHANGE UP (ref 22–31)
CREAT SERPL-MCNC: 0.77 MG/DL — SIGNIFICANT CHANGE UP (ref 0.5–1.3)
EOSINOPHIL # BLD AUTO: 0.11 K/UL — SIGNIFICANT CHANGE UP (ref 0–0.5)
EOSINOPHIL NFR BLD AUTO: 2.8 % — SIGNIFICANT CHANGE UP (ref 0–6)
GLUCOSE SERPL-MCNC: 90 MG/DL — SIGNIFICANT CHANGE UP (ref 70–99)
HCG SERPL-ACNC: < 5 MIU/ML — SIGNIFICANT CHANGE UP
HCT VFR BLD CALC: 24 % — LOW (ref 34.5–45)
HGB BLD-MCNC: 8.2 G/DL — LOW (ref 11.5–15.5)
IMM GRANULOCYTES NFR BLD AUTO: 0.3 % — SIGNIFICANT CHANGE UP (ref 0–1.5)
LYMPHOCYTES # BLD AUTO: 1.98 K/UL — SIGNIFICANT CHANGE UP (ref 1–3.3)
LYMPHOCYTES # BLD AUTO: 50.1 % — HIGH (ref 13–44)
MCHC RBC-ENTMCNC: 29.3 PG — SIGNIFICANT CHANGE UP (ref 27–34)
MCHC RBC-ENTMCNC: 34.2 % — SIGNIFICANT CHANGE UP (ref 32–36)
MCV RBC AUTO: 85.7 FL — SIGNIFICANT CHANGE UP (ref 80–100)
MONOCYTES # BLD AUTO: 0.37 K/UL — SIGNIFICANT CHANGE UP (ref 0–0.9)
MONOCYTES NFR BLD AUTO: 9.4 % — SIGNIFICANT CHANGE UP (ref 2–14)
NEUTROPHILS # BLD AUTO: 1.45 K/UL — LOW (ref 1.8–7.4)
NEUTROPHILS NFR BLD AUTO: 36.6 % — LOW (ref 43–77)
NRBC # FLD: 0.02 K/UL — SIGNIFICANT CHANGE UP (ref 0–0)
PLATELET # BLD AUTO: 140 K/UL — LOW (ref 150–400)
PMV BLD: 9.1 FL — SIGNIFICANT CHANGE UP (ref 7–13)
POTASSIUM SERPL-MCNC: 4.3 MMOL/L — SIGNIFICANT CHANGE UP (ref 3.5–5.3)
POTASSIUM SERPL-SCNC: 4.3 MMOL/L — SIGNIFICANT CHANGE UP (ref 3.5–5.3)
PROT SERPL-MCNC: 8.1 G/DL — SIGNIFICANT CHANGE UP (ref 6–8.3)
RBC # BLD: 2.8 M/UL — LOW (ref 3.8–5.2)
RBC # FLD: 13.4 % — SIGNIFICANT CHANGE UP (ref 10.3–14.5)
RETICS #: 75 K/UL — SIGNIFICANT CHANGE UP (ref 25–125)
RETICS/RBC NFR: 2.7 % — HIGH (ref 0.5–2.5)
SODIUM SERPL-SCNC: 137 MMOL/L — SIGNIFICANT CHANGE UP (ref 135–145)
TROPONIN T, HIGH SENSITIVITY: < 6 NG/L — SIGNIFICANT CHANGE UP (ref ?–14)
TROPONIN T, HIGH SENSITIVITY: < 6 NG/L — SIGNIFICANT CHANGE UP (ref ?–14)
WBC # BLD: 3.95 K/UL — SIGNIFICANT CHANGE UP (ref 3.8–10.5)
WBC # FLD AUTO: 3.95 K/UL — SIGNIFICANT CHANGE UP (ref 3.8–10.5)

## 2019-06-28 PROCEDURE — 71045 X-RAY EXAM CHEST 1 VIEW: CPT | Mod: 26

## 2019-06-28 PROCEDURE — 99223 1ST HOSP IP/OBS HIGH 75: CPT

## 2019-06-28 RX ORDER — DIPHENHYDRAMINE HCL 50 MG
25 CAPSULE ORAL ONCE
Refills: 0 | Status: COMPLETED | OUTPATIENT
Start: 2019-06-28 | End: 2019-06-28

## 2019-06-28 RX ORDER — LANOLIN ALCOHOL/MO/W.PET/CERES
3 CREAM (GRAM) TOPICAL AT BEDTIME
Refills: 0 | Status: DISCONTINUED | OUTPATIENT
Start: 2019-06-28 | End: 2019-06-30

## 2019-06-28 RX ORDER — NALOXONE HYDROCHLORIDE 4 MG/.1ML
0.1 SPRAY NASAL
Refills: 0 | Status: DISCONTINUED | OUTPATIENT
Start: 2019-06-28 | End: 2019-06-30

## 2019-06-28 RX ORDER — FOLIC ACID 0.8 MG
1 TABLET ORAL DAILY
Refills: 0 | Status: DISCONTINUED | OUTPATIENT
Start: 2019-06-28 | End: 2019-06-30

## 2019-06-28 RX ORDER — ACETAMINOPHEN 500 MG
975 TABLET ORAL ONCE
Refills: 0 | Status: COMPLETED | OUTPATIENT
Start: 2019-06-28 | End: 2019-06-28

## 2019-06-28 RX ORDER — MORPHINE SULFATE 50 MG/1
4 CAPSULE, EXTENDED RELEASE ORAL ONCE
Refills: 0 | Status: DISCONTINUED | OUTPATIENT
Start: 2019-06-28 | End: 2019-06-28

## 2019-06-28 RX ORDER — ENOXAPARIN SODIUM 100 MG/ML
40 INJECTION SUBCUTANEOUS EVERY 24 HOURS
Refills: 0 | Status: DISCONTINUED | OUTPATIENT
Start: 2019-06-28 | End: 2019-06-30

## 2019-06-28 RX ORDER — HYDROXYUREA 500 MG/1
500 CAPSULE ORAL
Refills: 0 | Status: DISCONTINUED | OUTPATIENT
Start: 2019-06-29 | End: 2019-06-30

## 2019-06-28 RX ORDER — HYDROXYUREA 500 MG/1
2 CAPSULE ORAL
Qty: 0 | Refills: 0 | DISCHARGE

## 2019-06-28 RX ORDER — MORPHINE SULFATE 50 MG/1
4 CAPSULE, EXTENDED RELEASE ORAL ONCE
Refills: 0 | Status: DISCONTINUED | OUTPATIENT
Start: 2019-06-28 | End: 2019-06-30

## 2019-06-28 RX ORDER — DIPHENHYDRAMINE HCL 50 MG
25 CAPSULE ORAL EVERY 4 HOURS
Refills: 0 | Status: DISCONTINUED | OUTPATIENT
Start: 2019-06-28 | End: 2019-06-30

## 2019-06-28 RX ORDER — DOCUSATE SODIUM 100 MG
100 CAPSULE ORAL
Refills: 0 | Status: DISCONTINUED | OUTPATIENT
Start: 2019-06-28 | End: 2019-06-30

## 2019-06-28 RX ORDER — SODIUM CHLORIDE 9 MG/ML
1000 INJECTION INTRAMUSCULAR; INTRAVENOUS; SUBCUTANEOUS ONCE
Refills: 0 | Status: COMPLETED | OUTPATIENT
Start: 2019-06-28 | End: 2019-06-28

## 2019-06-28 RX ORDER — MORPHINE SULFATE 50 MG/1
60 CAPSULE, EXTENDED RELEASE ORAL EVERY 12 HOURS
Refills: 0 | Status: DISCONTINUED | OUTPATIENT
Start: 2019-06-28 | End: 2019-06-30

## 2019-06-28 RX ORDER — ONDANSETRON 8 MG/1
4 TABLET, FILM COATED ORAL EVERY 6 HOURS
Refills: 0 | Status: DISCONTINUED | OUTPATIENT
Start: 2019-06-28 | End: 2019-06-30

## 2019-06-28 RX ORDER — CHOLECALCIFEROL (VITAMIN D3) 125 MCG
1000 CAPSULE ORAL DAILY
Refills: 0 | Status: DISCONTINUED | OUTPATIENT
Start: 2019-06-28 | End: 2019-06-30

## 2019-06-28 RX ORDER — SENNA PLUS 8.6 MG/1
2 TABLET ORAL AT BEDTIME
Refills: 0 | Status: DISCONTINUED | OUTPATIENT
Start: 2019-06-28 | End: 2019-06-30

## 2019-06-28 RX ORDER — HYDROMORPHONE HYDROCHLORIDE 2 MG/ML
30 INJECTION INTRAMUSCULAR; INTRAVENOUS; SUBCUTANEOUS
Refills: 0 | Status: DISCONTINUED | OUTPATIENT
Start: 2019-06-28 | End: 2019-06-30

## 2019-06-28 RX ORDER — FENTANYL CITRATE 50 UG/ML
1 INJECTION INTRAVENOUS
Refills: 0 | Status: DISCONTINUED | OUTPATIENT
Start: 2019-06-28 | End: 2019-06-30

## 2019-06-28 RX ORDER — SODIUM CHLORIDE 9 MG/ML
1000 INJECTION, SOLUTION INTRAVENOUS
Refills: 0 | Status: DISCONTINUED | OUTPATIENT
Start: 2019-06-28 | End: 2019-06-30

## 2019-06-28 RX ADMIN — Medication 25 MILLIGRAM(S): at 18:27

## 2019-06-28 RX ADMIN — HYDROMORPHONE HYDROCHLORIDE 30 MILLILITER(S): 2 INJECTION INTRAMUSCULAR; INTRAVENOUS; SUBCUTANEOUS at 23:38

## 2019-06-28 RX ADMIN — SODIUM CHLORIDE 500 MILLILITER(S): 9 INJECTION INTRAMUSCULAR; INTRAVENOUS; SUBCUTANEOUS at 17:52

## 2019-06-28 RX ADMIN — MORPHINE SULFATE 4 MILLIGRAM(S): 50 CAPSULE, EXTENDED RELEASE ORAL at 18:56

## 2019-06-28 RX ADMIN — Medication 975 MILLIGRAM(S): at 20:51

## 2019-06-28 RX ADMIN — MORPHINE SULFATE 4 MILLIGRAM(S): 50 CAPSULE, EXTENDED RELEASE ORAL at 17:52

## 2019-06-28 RX ADMIN — SODIUM CHLORIDE 1000 MILLILITER(S): 9 INJECTION INTRAMUSCULAR; INTRAVENOUS; SUBCUTANEOUS at 20:51

## 2019-06-28 RX ADMIN — MORPHINE SULFATE 4 MILLIGRAM(S): 50 CAPSULE, EXTENDED RELEASE ORAL at 20:38

## 2019-06-28 RX ADMIN — FENTANYL CITRATE 1 PATCH: 50 INJECTION INTRAVENOUS at 23:45

## 2019-06-28 NOTE — ED ADULT NURSE NOTE - PMH
Acute chest syndrome    H/O sickle cell disease    Hb-SS disease with crisis    Smoking  ~ quit ~ 2012  Vitamin D deficiency

## 2019-06-28 NOTE — H&P ADULT - PROBLEM SELECTOR PROBLEM 4
I have attempted without success to contact this patient by phone left message for pt to call back.      Insomnia due to stress

## 2019-06-28 NOTE — ED PROVIDER NOTE - CLINICAL SUMMARY MEDICAL DECISION MAKING FREE TEXT BOX
pain control, ivf, cxr, labs, reassess. Pain in young female with h/o sickle cell and prior acute chest syndrome. Likely painful occlusive crisis Plan: pain control, ivf, cxr, ekg  labs, reassess. Pain in young female with h/o sickle cell and prior acute chest syndrome. Likely painful occlusive crisis r/o other pathology. Plan: labs, retic count, pain control, ivf, cxr, ekg  labs, reassess.

## 2019-06-28 NOTE — ED ADULT NURSE NOTE - NSIMPLEMENTINTERV_GEN_ALL_ED
Implemented All Universal Safety Interventions:  [place] to call system. Call bell, personal items and telephone within reach. Instruct patient to call for assistance. Room bathroom lighting operational. Non-slip footwear when patient is off stretcher. Physically safe environment: no spills, clutter or unnecessary equipment. Stretcher in lowest position, wheels locked, appropriate side rails in place.

## 2019-06-28 NOTE — H&P ADULT - PROBLEM SELECTOR PLAN 1
- pains present for the past ~ 2 weeks, likely trigged by stress associated with family affairs (undergoing a divorce), lack of sleep with fatigue  - ran out of fentanyl 50 mcg yesterday  - continuing PTA fentanyl 50 mcg, morphine 60 mg ER BID.  morphine 30 mg Q6H PRN held  - continuing also Hydrea 500 mg 4 times daily  - prescribed Dilaudid PCA  - ensure optimal hydration; s/p 2 liters NS in the ED.  Started now on 0.45 NS at 100 mL/Hr x 20 hours  - hematology consult in the AM - per PMD's designation (f/u w/ Dr Mccollum as outpatient)

## 2019-06-28 NOTE — ED PROVIDER NOTE - PROGRESS NOTE DETAILS
Teodoro: patient endorsed to me in sign-out. Awaiting CXR. Teodoro: patient admitted for SCC pain crisis. Stable for the floors.

## 2019-06-28 NOTE — ED ADULT NURSE REASSESSMENT NOTE - NS ED NURSE REASSESS COMMENT FT1
report recd from daytime rn, patient resting comfortably in stretcher, requesting more morphine at this time, will speak with md. vss. resps even and unlabored, will ctm

## 2019-06-28 NOTE — H&P ADULT - PROBLEM SELECTOR PLAN 2
- bitemporal  - associated with stress associated with current divorce proceedings  - also complicated by insomnia  - melatonin 3 mg x one tonight, then PRN  - encourage good sleep hygiene  - ensure optimal hydration  - no additional analgesic for headache presently; headache should resolve with adequate rest

## 2019-06-28 NOTE — ED PROVIDER NOTE - NS ED ROS FT
GENERAL: no fever, chills  HEENT: no cough, congestion, odynophagia, dysphagia  CARDIAC: palpitations, lightheadedness; + chest pain  PULM: no dyspnea, wheezing   GI: no abdominal pain, nausea, vomiting, diarrhea, constipation, melena, hematochezia  : no urinary dysuria, frequency, incontinence, hematuria  NEURO: motor weakness, sensory changes; + headache  MSK: + arm pain, leg pain  SKIN: no rashes, hives  HEME: no active bleeding, bruising

## 2019-06-28 NOTE — ED PROVIDER NOTE - OBJECTIVE STATEMENT
43F with SCC who presents with headache, arm pain, and leg pain. Reports mild chest pain that's "not bad." Pain has been ongoing for 2 weeks and not controlled by current PO medication. Denies F/C/N/V, SOB. No recent travel, no sick contacts.

## 2019-06-28 NOTE — H&P ADULT - PROBLEM SELECTOR PLAN 3
- described as feeling like someone pounding on the chest  - not akin to chest pain at the time of prior ACS  - in the setting of fatigue, apparent exhaustion  - trop = <6 --> <6  - ECG = NSR at 67 bpm, with QTc = 409

## 2019-06-28 NOTE — ED ADULT NURSE NOTE - OBJECTIVE STATEMENT
Pt c/o sickle cell crisis, c/o pain to head, chest, arms and legs.  Pt to room 16 and evaluated by MD. IVL placed to left underwrist/hand and labs drawn and sent. Pt given medication as ordered and NS infusing.    RICHELLE Louie

## 2019-06-28 NOTE — H&P ADULT - HISTORY OF PRESENT ILLNESS
43 year old female, with past history significant for Sickle cell anemia with crisis, ACS, Smoking, and Vitamin D deficiency, presented to the ED secondary to headache, arm pain, leg pain, mild chest pain x 2 weeks.  Seen and evaluated at bedside;    Vital signs upon ED presentation as follows: BP = 120/56, HR = 73, RR = 18, T = 36.8 C (98.2 F), O2 Sat = 100% on RA.  Diagnosed with Sickle Cell pain Crisis.  Prescribed NaCl 2 liters, Tylenol 975 mg x one, Benadryl 25 mg PO x one and morphine 4 mg IV x 3 in the ED. 43 year old female, with past history significant for Sickle cell anemia with crisis, ACS, Smoking, and Vitamin D deficiency, presented to the ED secondary to headache, and sickle cell type pains for the past weeks.  Seen and evaluated at bedside; appears very somnolent, but in NAD.  Patient reports bitemporal headaches, chest pain, B/L arm pain, and B/L knee pains, not ameliorated with prescribed medications at home; ran out of fentanyl 50 mcg Q72 hours yesterday.  Chest pain is dissimilar to that experienced during prior ACS; fells like someone pounding on her chest.  Patient surmises that inadequate rest/sleep has triggered this episode; currently going through a divorce and cites the associated stressors, as well as the lack of sleep with associated extreme fatigue.  Indicates keeping herself hydrated.  No reports of fevers, chills, sweating, nausea, vomiting, abdominal pain, diarrhea, constipation or dysuria .    Vital signs upon ED presentation as follows: BP = 120/56, HR = 73, RR = 18, T = 36.8 C (98.2 F), O2 Sat = 100% on RA.  Diagnosed with Sickle Cell pain Crisis.  Prescribed NaCl 2 liters, Tylenol 975 mg x one, Benadryl 25 mg PO x one and morphine 4 mg IV x 3 in the ED.

## 2019-06-28 NOTE — H&P ADULT - ASSESSMENT
43 year old female, with past history significant for Sickle cell anemia with crisis, ACS, Smoking, and Vitamin D deficiency, presented to the ED secondary to headache, arm pain, leg pain, mild chest pain x 2 weeks.  Vital signs upon ED presentation as follows: BP = 120/56, HR = 73, RR = 18, T = 36.8 C (98.2 F), O2 Sat = 100% on RA.  Diagnosed with Sickle Cell pain Crisis.  Admitted for further management of: 43 year old female, with past history significant for Sickle cell anemia with crisis, ACS, Smoking, and Vitamin D deficiency, presented to the ED secondary to headache, and sickle cell type pains for the past weeks.  Vital signs upon ED presentation as follows: BP = 120/56, HR = 73, RR = 18, T = 36.8 C (98.2 F), O2 Sat = 100% on RA.  Diagnosed with Sickle Cell pain Crisis.  Admitted for further management of:

## 2019-06-28 NOTE — H&P ADULT - NSHPLABSRESULTS_GEN_ALL_CORE
8.2    3.95  )-----------( 140      ( 28 Jun 2019 17:25 )             24.0       06-28    137  |  103  |  9   ----------------------------<  90  4.3   |  22  |  0.77    Ca    9.4      28 Jun 2019 17:07    TPro  8.1  /  Alb  4.4  /  TBili  1.7<H>  /  DBili  x   /  AST  30  /  ALT  9   /  AlkPhos  56  06-28      Lactate Trend    CAPILLARY BLOOD GLUCOSE 8.2    3.95  )-----------( 140      ( 28 Jun 2019 17:25 )             24.0       06-28    137  |  103  |  9   ----------------------------<  90  4.3   |  22  |  0.77    Ca    9.4      28 Jun 2019 17:07    TPro  8.1  /  Alb  4.4  /  TBili  1.7<H>  /  DBili  x   /  AST  30  /  ALT  9   /  AlkPhos  56  06-28      Lactate Trend    CAPILLARY BLOOD GLUCOSE    ==================================================================    ECG = NSR at 67 bpm, QTc = 409

## 2019-06-28 NOTE — H&P ADULT - PROBLEM SELECTOR PLAN 4
- undergoing a divorce and being affected by associated stress  - trial of melatonin 3 mg PO x one, then PRN  - ensure optimal pain control in order to facilitate sleep  - encourage good sleep hygiene

## 2019-06-28 NOTE — ED PROVIDER NOTE - ATTENDING CONTRIBUTION TO CARE
43F with SCC who presents with headache, arm pain, and leg pain. Reports mild chest pain that's "not bad." Pain has been ongoing for 2 weeks and not controlled by current PO medication. Denies F/C/N/V, SOB. No recent travel, no sick contacts. On exam: VSS lungs, heart, pulses, abdomen, neuro, extremities, skin, all normal on exam. IMP: Pain in young female with h/o sickle cell and prior acute chest syndrome. Likely painful occlusive crisis Plan: pain control, ivf, cxr, ekg  labs, reassess.

## 2019-06-28 NOTE — H&P ADULT - CONSTITUTIONAL COMMENTS
slim appearing female, lying in stretcher with HOB elevated, appearing fatigued, somnolent, but easily woken

## 2019-06-29 PROBLEM — Z86.2 PERSONAL HISTORY OF DISEASES OF THE BLOOD AND BLOOD-FORMING ORGANS AND CERTAIN DISORDERS INVOLVING THE IMMUNE MECHANISM: Chronic | Status: ACTIVE | Noted: 2019-03-16

## 2019-06-29 PROCEDURE — 99233 SBSQ HOSP IP/OBS HIGH 50: CPT

## 2019-06-29 RX ADMIN — Medication 1000 UNIT(S): at 13:21

## 2019-06-29 RX ADMIN — HYDROXYUREA 500 MILLIGRAM(S): 500 CAPSULE ORAL at 16:39

## 2019-06-29 RX ADMIN — HYDROXYUREA 500 MILLIGRAM(S): 500 CAPSULE ORAL at 22:06

## 2019-06-29 RX ADMIN — ENOXAPARIN SODIUM 40 MILLIGRAM(S): 100 INJECTION SUBCUTANEOUS at 00:08

## 2019-06-29 RX ADMIN — FENTANYL CITRATE 1 PATCH: 50 INJECTION INTRAVENOUS at 06:03

## 2019-06-29 RX ADMIN — Medication 100 MILLIGRAM(S): at 06:09

## 2019-06-29 RX ADMIN — HYDROXYUREA 500 MILLIGRAM(S): 500 CAPSULE ORAL at 06:09

## 2019-06-29 RX ADMIN — MORPHINE SULFATE 60 MILLIGRAM(S): 50 CAPSULE, EXTENDED RELEASE ORAL at 06:09

## 2019-06-29 RX ADMIN — Medication 1 TABLET(S): at 13:21

## 2019-06-29 RX ADMIN — MORPHINE SULFATE 60 MILLIGRAM(S): 50 CAPSULE, EXTENDED RELEASE ORAL at 18:43

## 2019-06-29 RX ADMIN — SENNA PLUS 2 TABLET(S): 8.6 TABLET ORAL at 22:06

## 2019-06-29 RX ADMIN — Medication 100 MILLIGRAM(S): at 18:43

## 2019-06-29 RX ADMIN — MORPHINE SULFATE 60 MILLIGRAM(S): 50 CAPSULE, EXTENDED RELEASE ORAL at 18:41

## 2019-06-29 RX ADMIN — HYDROXYUREA 500 MILLIGRAM(S): 500 CAPSULE ORAL at 18:42

## 2019-06-29 RX ADMIN — FENTANYL CITRATE 1 PATCH: 50 INJECTION INTRAVENOUS at 18:43

## 2019-06-29 RX ADMIN — HYDROMORPHONE HYDROCHLORIDE 30 MILLILITER(S): 2 INJECTION INTRAMUSCULAR; INTRAVENOUS; SUBCUTANEOUS at 08:22

## 2019-06-29 RX ADMIN — SODIUM CHLORIDE 100 MILLILITER(S): 9 INJECTION, SOLUTION INTRAVENOUS at 08:52

## 2019-06-29 RX ADMIN — HYDROMORPHONE HYDROCHLORIDE 30 MILLILITER(S): 2 INJECTION INTRAMUSCULAR; INTRAVENOUS; SUBCUTANEOUS at 20:04

## 2019-06-29 RX ADMIN — Medication 1 MILLIGRAM(S): at 13:22

## 2019-06-29 NOTE — PROGRESS NOTE ADULT - SUBJECTIVE AND OBJECTIVE BOX
Patient is a 43y old  Female who presents with a chief complaint of Sickle cell pain crisis (28 Jun 2019 21:36)      SUBJECTIVE / OVERNIGHT EVENTS:    MEDICATIONS  (STANDING):  cholecalciferol 1000 Unit(s) Oral daily  docusate sodium 100 milliGRAM(s) Oral two times a day  enoxaparin Injectable 40 milliGRAM(s) SubCutaneous every 24 hours  fentaNYL   Patch  50 MICROgram(s)/Hr 1 Patch Transdermal every 72 hours  folic acid 1 milliGRAM(s) Oral daily  HYDROmorphone PCA (1 mG/mL) 30 milliLiter(s) PCA Continuous PCA Continuous  hydroxyurea 500 milliGRAM(s) Oral <User Schedule>  morphine ER Tablet 60 milliGRAM(s) Oral every 12 hours  multivitamin 1 Tablet(s) Oral daily  senna 2 Tablet(s) Oral at bedtime  sodium chloride 0.45%. 1000 milliLiter(s) (100 mL/Hr) IV Continuous <Continuous>    MEDICATIONS  (PRN):  diphenhydrAMINE 25 milliGRAM(s) Oral every 4 hours PRN Rash and/or Itching  melatonin 3 milliGRAM(s) Oral at bedtime PRN Insomnia  morphine  - Injectable 4 milliGRAM(s) IV Push once PRN Moderate Pain (4 - 6)  naloxone Injectable 0.1 milliGRAM(s) IV Push every 3 minutes PRN For ANY of the following changes in patient status:  A. RR LESS THAN 10 breaths per minute, B. Oxygen saturation LESS THAN 90%, C. Sedation score of 6  ondansetron Injectable 4 milliGRAM(s) IV Push every 6 hours PRN Nausea        CAPILLARY BLOOD GLUCOSE        I&O's Summary      PHYSICAL EXAM:  GENERAL: NAD, well-developed  HEAD:  Atraumatic, Normocephalic  EYES: EOMI, PERRLA, conjunctiva and sclera clear  NECK: Supple, No JVD  CHEST/LUNG: Clear to auscultation bilaterally; No wheeze  HEART: Regular rate and rhythm; No murmurs, rubs, or gallops  ABDOMEN: Soft, Nontender, Nondistended; Bowel sounds present  EXTREMITIES:  2+ Peripheral Pulses, No clubbing, cyanosis, or edema  PSYCH: AAOx3  NEUROLOGY: non-focal  SKIN: No rashes or lesions    LABS:                        8.2    3.95  )-----------( 140      ( 28 Jun 2019 17:25 )             24.0     06-28    137  |  103  |  9   ----------------------------<  90  4.3   |  22  |  0.77    Ca    9.4      28 Jun 2019 17:07    TPro  8.1  /  Alb  4.4  /  TBili  1.7<H>  /  DBili  x   /  AST  30  /  ALT  9   /  AlkPhos  56  06-28              RADIOLOGY & ADDITIONAL TESTS:    Imaging Personally Reviewed:    Consultant(s) Notes Reviewed:      Care Discussed with Consultants/Other Providers: Patient is a 43y old  Female who presents with a chief complaint of Sickle cell pain crisis (28 Jun 2019 21:36)      SUBJECTIVE / OVERNIGHT EVENTS:  Very pleasant lady- notes SS pain is controlled with PCA pump    MEDICATIONS  (STANDING):  cholecalciferol 1000 Unit(s) Oral daily  docusate sodium 100 milliGRAM(s) Oral two times a day  enoxaparin Injectable 40 milliGRAM(s) SubCutaneous every 24 hours  fentaNYL   Patch  50 MICROgram(s)/Hr 1 Patch Transdermal every 72 hours  folic acid 1 milliGRAM(s) Oral daily  HYDROmorphone PCA (1 mG/mL) 30 milliLiter(s) PCA Continuous PCA Continuous  hydroxyurea 500 milliGRAM(s) Oral <User Schedule>  morphine ER Tablet 60 milliGRAM(s) Oral every 12 hours  multivitamin 1 Tablet(s) Oral daily  senna 2 Tablet(s) Oral at bedtime  sodium chloride 0.45%. 1000 milliLiter(s) (100 mL/Hr) IV Continuous <Continuous>    MEDICATIONS  (PRN):  diphenhydrAMINE 25 milliGRAM(s) Oral every 4 hours PRN Rash and/or Itching  melatonin 3 milliGRAM(s) Oral at bedtime PRN Insomnia  morphine  - Injectable 4 milliGRAM(s) IV Push once PRN Moderate Pain (4 - 6)  naloxone Injectable 0.1 milliGRAM(s) IV Push every 3 minutes PRN For ANY of the following changes in patient status:  A. RR LESS THAN 10 breaths per minute, B. Oxygen saturation LESS THAN 90%, C. Sedation score of 6  ondansetron Injectable 4 milliGRAM(s) IV Push every 6 hours PRN Nausea    Vital Signs Last 24 Hrs  T(C): 37 (29 Jun 2019 04:04), Max: 37 (29 Jun 2019 04:04)  T(F): 98.6 (29 Jun 2019 04:04), Max: 98.6 (29 Jun 2019 04:04)  HR: 62 (29 Jun 2019 04:04) (60 - 76)  BP: 108/59 (29 Jun 2019 04:04) (106/58 - 120/56)  BP(mean): --  RR: 15 (29 Jun 2019 04:04) (15 - 18)  SpO2: 100% (29 Jun 2019 04:04) (100% - 100%)      PHYSICAL EXAM:  GENERAL: NAD, well-developed  HEAD:  Atraumatic, Normocephalic  EYES: EOMI, PERRLA, conjunctiva and sclera clear  NECK: Supple, No JVD  CHEST/LUNG: Clear to auscultation bilaterally; No wheeze  HEART: Regular rate and rhythm; No murmurs, rubs, or gallops  ABDOMEN: Soft, Nontender, Nondistended; Bowel sounds present  EXTREMITIES:  2+ Peripheral Pulses, No clubbing, cyanosis, or edema  PSYCH: AAOx3  NEUROLOGY: non-focal  SKIN: No rashes or lesions    LABS:                        8.2    3.95  )-----------( 140      ( 28 Jun 2019 17:25 )             24.0     06-28    137  |  103  |  9   ----------------------------<  90  4.3   |  22  |  0.77    Ca    9.4      28 Jun 2019 17:07    TPro  8.1  /  Alb  4.4  /  TBili  1.7<H>  /  DBili  x   /  AST  30  /  ALT  9   /  AlkPhos  56  06-28          RADIOLOGY & ADDITIONAL TESTS:  < from: Xray Chest 1 View- PORTABLE-Urgent (06.28.19 @ 19:59) >  IMPRESSION:  Clear lungs.

## 2019-06-29 NOTE — PROGRESS NOTE ADULT - ASSESSMENT
43 year old female, with past history significant for Sickle cell anemia with crisis, ACS, Smoking, and Vitamin D deficiency, presented to the ED secondary to headache, and sickle cell type pains for the past weeks.  Vital signs upon ED presentation as follows: BP = 120/56, HR = 73, RR = 18, T = 36.8 C (98.2 F), O2 Sat = 100% on RA.  Diagnosed with Sickle Cell pain Crisis.  Admitted for further management of:

## 2019-06-30 ENCOUNTER — TRANSCRIPTION ENCOUNTER (OUTPATIENT)
Age: 44
End: 2019-06-30

## 2019-06-30 VITALS
DIASTOLIC BLOOD PRESSURE: 57 MMHG | SYSTOLIC BLOOD PRESSURE: 102 MMHG | HEART RATE: 65 BPM | TEMPERATURE: 98 F | RESPIRATION RATE: 16 BRPM | OXYGEN SATURATION: 98 %

## 2019-06-30 LAB
ANION GAP SERPL CALC-SCNC: 9 MMO/L — SIGNIFICANT CHANGE UP (ref 7–14)
BUN SERPL-MCNC: 6 MG/DL — LOW (ref 7–23)
CALCIUM SERPL-MCNC: 8.6 MG/DL — SIGNIFICANT CHANGE UP (ref 8.4–10.5)
CHLORIDE SERPL-SCNC: 107 MMOL/L — SIGNIFICANT CHANGE UP (ref 98–107)
CO2 SERPL-SCNC: 22 MMOL/L — SIGNIFICANT CHANGE UP (ref 22–31)
CREAT SERPL-MCNC: 0.72 MG/DL — SIGNIFICANT CHANGE UP (ref 0.5–1.3)
GLUCOSE SERPL-MCNC: 82 MG/DL — SIGNIFICANT CHANGE UP (ref 70–99)
HCT VFR BLD CALC: 20.4 % — CRITICAL LOW (ref 34.5–45)
HGB BLD-MCNC: 7.1 G/DL — LOW (ref 11.5–15.5)
LDH SERPL L TO P-CCNC: 195 U/L — SIGNIFICANT CHANGE UP (ref 135–225)
MCHC RBC-ENTMCNC: 29.5 PG — SIGNIFICANT CHANGE UP (ref 27–34)
MCHC RBC-ENTMCNC: 34.8 % — SIGNIFICANT CHANGE UP (ref 32–36)
MCV RBC AUTO: 84.6 FL — SIGNIFICANT CHANGE UP (ref 80–100)
NRBC # FLD: 0.03 K/UL — SIGNIFICANT CHANGE UP (ref 0–0)
PLATELET # BLD AUTO: 118 K/UL — LOW (ref 150–400)
PMV BLD: 9.4 FL — SIGNIFICANT CHANGE UP (ref 7–13)
POTASSIUM SERPL-MCNC: 3.7 MMOL/L — SIGNIFICANT CHANGE UP (ref 3.5–5.3)
POTASSIUM SERPL-SCNC: 3.7 MMOL/L — SIGNIFICANT CHANGE UP (ref 3.5–5.3)
RBC # BLD: 2.41 M/UL — LOW (ref 3.8–5.2)
RBC # FLD: 13.2 % — SIGNIFICANT CHANGE UP (ref 10.3–14.5)
SODIUM SERPL-SCNC: 138 MMOL/L — SIGNIFICANT CHANGE UP (ref 135–145)
WBC # BLD: 3.06 K/UL — LOW (ref 3.8–10.5)
WBC # FLD AUTO: 3.06 K/UL — LOW (ref 3.8–10.5)

## 2019-06-30 PROCEDURE — 99239 HOSP IP/OBS DSCHRG MGMT >30: CPT

## 2019-06-30 RX ADMIN — Medication 1 MILLIGRAM(S): at 11:57

## 2019-06-30 RX ADMIN — HYDROXYUREA 500 MILLIGRAM(S): 500 CAPSULE ORAL at 13:32

## 2019-06-30 RX ADMIN — FENTANYL CITRATE 1 PATCH: 50 INJECTION INTRAVENOUS at 06:16

## 2019-06-30 RX ADMIN — HYDROMORPHONE HYDROCHLORIDE 30 MILLILITER(S): 2 INJECTION INTRAMUSCULAR; INTRAVENOUS; SUBCUTANEOUS at 02:15

## 2019-06-30 RX ADMIN — MORPHINE SULFATE 60 MILLIGRAM(S): 50 CAPSULE, EXTENDED RELEASE ORAL at 06:25

## 2019-06-30 RX ADMIN — HYDROXYUREA 500 MILLIGRAM(S): 500 CAPSULE ORAL at 06:25

## 2019-06-30 RX ADMIN — Medication 1 TABLET(S): at 11:57

## 2019-06-30 RX ADMIN — ENOXAPARIN SODIUM 40 MILLIGRAM(S): 100 INJECTION SUBCUTANEOUS at 00:13

## 2019-06-30 RX ADMIN — HYDROMORPHONE HYDROCHLORIDE 30 MILLILITER(S): 2 INJECTION INTRAMUSCULAR; INTRAVENOUS; SUBCUTANEOUS at 07:59

## 2019-06-30 RX ADMIN — Medication 100 MILLIGRAM(S): at 06:25

## 2019-06-30 RX ADMIN — Medication 1000 UNIT(S): at 11:57

## 2019-06-30 NOTE — PROGRESS NOTE ADULT - PROBLEM SELECTOR PLAN 5
- diagnosed during a prior admission  - continuing vitamin D3 1000 IU asia
- diagnosed during a prior admission  - continuing vitamin D3 1000 IU asia

## 2019-06-30 NOTE — PROGRESS NOTE ADULT - ATTENDING COMMENTS
Patient Ready for home.  She wants to f/u with Dr Mccollum -PCP out patient.  She has her medications of MS Contin and Morphine Ir and Fentanyl at home prescribed by Dr Mccollum.  40 min to coordinate d/c.

## 2019-06-30 NOTE — DISCHARGE NOTE NURSING/CASE MANAGEMENT/SOCIAL WORK - NSDCDPATPORTLINK_GEN_ALL_CORE
You can access the [facility] Portal, offered by [facility], by registering with the following website: [email or web address]

## 2019-06-30 NOTE — DISCHARGE NOTE PROVIDER - NSDCCPCAREPLAN_GEN_ALL_CORE_FT
PRINCIPAL DISCHARGE DIAGNOSIS  Diagnosis: Sickle cell pain crisis  Assessment and Plan of Treatment: Treated with IVF/ folic acid and pain medication.  Please continue with your home pain regimen and f/u PCP Dr Mccollum in 2 weeks

## 2019-06-30 NOTE — PROGRESS NOTE ADULT - PROBLEM SELECTOR PLAN 4
- undergoing a divorce and being affected by associated stress  - trial of melatonin 3 mg PO x one, then PRN  - ensure optimal pain control in order to facilitate sleep  - encourage good sleep hygiene
- undergoing a divorce and being affected by associated stress  - trial of melatonin 3 mg PO x one, then PRN  - ensure optimal pain control in order to facilitate sleep  - encourage good sleep hygiene

## 2019-06-30 NOTE — PROGRESS NOTE ADULT - PROBLEM SELECTOR PLAN 1
- pains present for the past ~ 2 weeks, likely trigged by stress associated with family affairs (undergoing a divorce), lack of sleep with fatigue  - ran out of fentanyl 50 mcg yesterday  - continuing PTA fentanyl 50 mcg, morphine 60 mg ER BID.  morphine 30 mg Q6H PRN held  - continuing also Hydrea 500 mg 4 times daily  - prescribed Dilaudid PCA  - ensure optimal hydration; s/p 2 liters NS in the ED.  Started now on 0.45 NS at 100 mL/Hr x 20 hours  - hematology consult in the AM - per PMD's designation (f/u w/ Dr Mccollum as outpatient)
- pains present for the past ~ 2 weeks, likely trigged by stress associated with family affairs (undergoing a divorce), lack of sleep with fatigue  - ran out of fentanyl 50 mcg yesterday  - continuing PTA fentanyl 50 mcg, morphine 60 mg ER BID.  morphine 30 mg Q6H PRN held  - continuing also Hydrea 500 mg 4 times daily  - prescribed Dilaudid PCA  - ensure optimal hydration; s/p 2 liters NS in the ED.  Started now on 0.45 NS at 100 mL/Hr x 20 hours  - hematology consult in the AM - per PMD's designation (f/u w/ Dr Mccollum as outpatient)

## 2019-06-30 NOTE — PROGRESS NOTE ADULT - SUBJECTIVE AND OBJECTIVE BOX
Patient is a 43y old  Female who presents with a chief complaint of Sickle cell pain crisis (29 Jun 2019 10:15)      SUBJECTIVE / OVERNIGHT EVENTS:    MEDICATIONS  (STANDING):  cholecalciferol 1000 Unit(s) Oral daily  docusate sodium 100 milliGRAM(s) Oral two times a day  enoxaparin Injectable 40 milliGRAM(s) SubCutaneous every 24 hours  fentaNYL   Patch  50 MICROgram(s)/Hr 1 Patch Transdermal every 72 hours  folic acid 1 milliGRAM(s) Oral daily  HYDROmorphone PCA (1 mG/mL) 30 milliLiter(s) PCA Continuous PCA Continuous  hydroxyurea 500 milliGRAM(s) Oral <User Schedule>  morphine ER Tablet 60 milliGRAM(s) Oral every 12 hours  multivitamin 1 Tablet(s) Oral daily  senna 2 Tablet(s) Oral at bedtime  sodium chloride 0.45%. 1000 milliLiter(s) (100 mL/Hr) IV Continuous <Continuous>    MEDICATIONS  (PRN):  diphenhydrAMINE 25 milliGRAM(s) Oral every 4 hours PRN Rash and/or Itching  melatonin 3 milliGRAM(s) Oral at bedtime PRN Insomnia  morphine  - Injectable 4 milliGRAM(s) IV Push once PRN Moderate Pain (4 - 6)  naloxone Injectable 0.1 milliGRAM(s) IV Push every 3 minutes PRN For ANY of the following changes in patient status:  A. RR LESS THAN 10 breaths per minute, B. Oxygen saturation LESS THAN 90%, C. Sedation score of 6  ondansetron Injectable 4 milliGRAM(s) IV Push every 6 hours PRN Nausea      Vital Signs Last 24 Hrs  T(C): 36.6 (30 Jun 2019 06:20), Max: 37.2 (29 Jun 2019 14:34)  T(F): 97.9 (30 Jun 2019 06:20), Max: 99 (29 Jun 2019 21:09)  HR: 65 (30 Jun 2019 06:20) (65 - 76)  BP: 102/57 (30 Jun 2019 06:20) (100/53 - 102/57)  BP(mean): --  RR: 16 (30 Jun 2019 06:20) (16 - 17)  SpO2: 98% (30 Jun 2019 06:20) (98% - 100%)      PHYSICAL EXAM:  GENERAL: NAD, well-developed  HEAD:  Atraumatic, Normocephalic  EYES: EOMI, PERRLA, conjunctiva and sclera clear  NECK: Supple, No JVD  CHEST/LUNG: Clear to auscultation bilaterally; No wheeze  HEART: Regular rate and rhythm; No murmurs, rubs, or gallops  ABDOMEN: Soft, Nontender, Nondistended; Bowel sounds present  EXTREMITIES:  2+ Peripheral Pulses, No clubbing, cyanosis, or edema  PSYCH: AAOx3  NEUROLOGY: non-focal  SKIN: No rashes or lesions    LABS:                        7.1    3.06  )-----------( 118      ( 30 Jun 2019 07:10 )             20.4     06-30    138  |  107  |  6<L>  ----------------------------<  82  3.7   |  22  |  0.72    Ca    8.6      30 Jun 2019 07:10    TPro  8.1  /  Alb  4.4  /  TBili  1.7<H>  /  DBili  x   /  AST  30  /  ALT  9   /  AlkPhos  56  06-28        RADIOLOGY & ADDITIONAL TESTS:    Imaging Personally Reviewed:    Consultant(s) Notes Reviewed:      Care Discussed with Consultants/Other Providers: Patient is a 43y old  Female who presents with a chief complaint of Sickle cell pain crisis (29 Jun 2019 10:15)      SUBJECTIVE / OVERNIGHT EVENTS:  Patient states she feels well and is ready for home.    MEDICATIONS  (STANDING):  cholecalciferol 1000 Unit(s) Oral daily  docusate sodium 100 milliGRAM(s) Oral two times a day  enoxaparin Injectable 40 milliGRAM(s) SubCutaneous every 24 hours  fentaNYL   Patch  50 MICROgram(s)/Hr 1 Patch Transdermal every 72 hours  folic acid 1 milliGRAM(s) Oral daily  HYDROmorphone PCA (1 mG/mL) 30 milliLiter(s) PCA Continuous PCA Continuous  hydroxyurea 500 milliGRAM(s) Oral <User Schedule>  morphine ER Tablet 60 milliGRAM(s) Oral every 12 hours  multivitamin 1 Tablet(s) Oral daily  senna 2 Tablet(s) Oral at bedtime  sodium chloride 0.45%. 1000 milliLiter(s) (100 mL/Hr) IV Continuous <Continuous>    MEDICATIONS  (PRN):  diphenhydrAMINE 25 milliGRAM(s) Oral every 4 hours PRN Rash and/or Itching  melatonin 3 milliGRAM(s) Oral at bedtime PRN Insomnia  morphine  - Injectable 4 milliGRAM(s) IV Push once PRN Moderate Pain (4 - 6)  naloxone Injectable 0.1 milliGRAM(s) IV Push every 3 minutes PRN For ANY of the following changes in patient status:  A. RR LESS THAN 10 breaths per minute, B. Oxygen saturation LESS THAN 90%, C. Sedation score of 6  ondansetron Injectable 4 milliGRAM(s) IV Push every 6 hours PRN Nausea      Vital Signs Last 24 Hrs  T(C): 36.6 (30 Jun 2019 06:20), Max: 37.2 (29 Jun 2019 14:34)  T(F): 97.9 (30 Jun 2019 06:20), Max: 99 (29 Jun 2019 21:09)  HR: 65 (30 Jun 2019 06:20) (65 - 76)  BP: 102/57 (30 Jun 2019 06:20) (100/53 - 102/57)  BP(mean): --  RR: 16 (30 Jun 2019 06:20) (16 - 17)  SpO2: 98% (30 Jun 2019 06:20) (98% - 100%)      PHYSICAL EXAM:  GENERAL: NAD, well-developed  HEAD:  Atraumatic, Normocephalic  EYES: EOMI, PERRLA, conjunctiva and sclera clear  NECK: Supple, No JVD  CHEST/LUNG: Clear to auscultation bilaterally; No wheeze  HEART: Regular rate and rhythm; No murmurs, rubs, or gallops  ABDOMEN: Soft, Nontender, Nondistended; Bowel sounds present  EXTREMITIES:  2+ Peripheral Pulses, No clubbing, cyanosis, or edema  PSYCH: AAOx3  NEUROLOGY: non-focal  SKIN: No rashes or lesions    LABS:                        7.1    3.06  )-----------( 118      ( 30 Jun 2019 07:10 )             20.4     06-30    138  |  107  |  6<L>  ----------------------------<  82  3.7   |  22  |  0.72    Ca    8.6      30 Jun 2019 07:10    TPro  8.1  /  Alb  4.4  /  TBili  1.7<H>  /  DBili  x   /  AST  30  /  ALT  9   /  AlkPhos  56  06-28        RADIOLOGY & ADDITIONAL TESTS:    Imaging Personally Reviewed:    Consultant(s) Notes Reviewed:      Care Discussed with Consultants/Other Providers:

## 2019-06-30 NOTE — PROGRESS NOTE ADULT - PROBLEM SELECTOR PLAN 3
- described as feeling like someone pounding on the chest  - not akin to chest pain at the time of prior ACS  - in the setting of fatigue, apparent exhaustion  - trop = <6 --> <6  - ECG = NSR at 67 bpm, with QTc = 409
- described as feeling like someone pounding on the chest  - not akin to chest pain at the time of prior ACS  - in the setting of fatigue, apparent exhaustion  - trop = <6 --> <6  - ECG = NSR at 67 bpm, with QTc = 409

## 2019-06-30 NOTE — PROGRESS NOTE ADULT - PROBLEM SELECTOR PLAN 2
- bitemporal  - associated with stress associated with current divorce proceedings  - also complicated by insomnia  - melatonin 3 mg x one tonight, then PRN  - encourage good sleep hygiene  - ensure optimal hydration  - no additional analgesic for headache presently; headache should resolve with adequate rest
- bitemporal  - associated with stress associated with current divorce proceedings  - also complicated by insomnia  - melatonin 3 mg x one tonight, then PRN  - encourage good sleep hygiene  - ensure optimal hydration  - no additional analgesic for headache presently; headache should resolve with adequate rest

## 2019-06-30 NOTE — DISCHARGE NOTE PROVIDER - HOSPITAL COURSE
43 year old female, with past history significant for Sickle cell anemia with crisis, ACS, Smoking, and Vitamin D deficiency, presented to the ED secondary to headache, and sickle cell type pains for the past weeks.  Seen and evaluated at bedside; appears very somnolent, but in NAD.  Patient reports bitemporal headaches, chest pain, B/L arm pain, and B/L knee pains, not ameliorated with prescribed medications at home; ran out of fentanyl 50 mcg Q72 hours yesterday.  Chest pain is dissimilar to that experienced during prior ACS; fells like someone pounding on her chest.  Patient surmises that inadequate rest/sleep has triggered this episode; currently going through a divorce and cites the associated stressors, as well as the lack of sleep with associated extreme fatigue.  Indicates keeping herself hydrated.  No reports of fevers, chills, sweating, nausea, vomiting, abdominal pain, diarrhea, constipation or dysuria .        Vital signs upon ED presentation as follows: BP = 120/56, HR = 73, RR = 18, T = 36.8 C (98.2 F), O2 Sat = 100% on RA.  Diagnosed with Sickle Cell pain Crisis.  Prescribed NaCl 2 liters, Tylenol 975 mg x one, Benadryl 25 mg PO x one and morphine 4 mg IV x 3 in the ED.        HOSPITAL COURSE:    Patient was treated with IVF hydration/ folic acid and Dilaudid PCA.    She notes MSK pain typical of her ss crisis.    She noted on 6/30/19 that she felt better and was ready to go home.    She already have all her medications prescribed by PCP Dr Mccollum to .    She will f/u with Dr Mccollum out patient.

## 2019-08-20 NOTE — PATIENT PROFILE ADULT - FALL HARM RISK
Detail Level: Detailed Quality 110: Preventive Care And Screening: Influenza Immunization: Influenza Immunization Administered during Influenza season Quality 130: Documentation Of Current Medications In The Medical Record: Current Medications Documented Quality 131: Pain Assessment And Follow-Up: Pain assessment using a standardized tool is documented as negative, no follow-up plan required other/PCA pump

## 2019-09-27 ENCOUNTER — INPATIENT (INPATIENT)
Facility: HOSPITAL | Age: 44
LOS: 2 days | Discharge: HOME | End: 2019-09-30
Attending: HOSPITALIST | Admitting: HOSPITALIST
Payer: MEDICAID

## 2019-09-27 VITALS
TEMPERATURE: 98 F | HEART RATE: 70 BPM | DIASTOLIC BLOOD PRESSURE: 69 MMHG | SYSTOLIC BLOOD PRESSURE: 114 MMHG | RESPIRATION RATE: 18 BRPM | OXYGEN SATURATION: 100 %

## 2019-09-27 DIAGNOSIS — Z98.890 OTHER SPECIFIED POSTPROCEDURAL STATES: Chronic | ICD-10-CM

## 2019-09-27 DIAGNOSIS — Z98.891 HISTORY OF UTERINE SCAR FROM PREVIOUS SURGERY: Chronic | ICD-10-CM

## 2019-09-27 LAB
ALBUMIN SERPL ELPH-MCNC: 4.6 G/DL — SIGNIFICANT CHANGE UP (ref 3.3–5)
ALP SERPL-CCNC: 50 U/L — SIGNIFICANT CHANGE UP (ref 40–120)
ALT FLD-CCNC: 5 U/L — SIGNIFICANT CHANGE UP (ref 4–33)
ANION GAP SERPL CALC-SCNC: 11 MMO/L — SIGNIFICANT CHANGE UP (ref 7–14)
AST SERPL-CCNC: 53 U/L — HIGH (ref 4–32)
BASOPHILS # BLD AUTO: 0.05 K/UL — SIGNIFICANT CHANGE UP (ref 0–0.2)
BASOPHILS NFR BLD AUTO: 0.9 % — SIGNIFICANT CHANGE UP (ref 0–2)
BILIRUB SERPL-MCNC: 1.8 MG/DL — HIGH (ref 0.2–1.2)
BUN SERPL-MCNC: 7 MG/DL — SIGNIFICANT CHANGE UP (ref 7–23)
CALCIUM SERPL-MCNC: 9.1 MG/DL — SIGNIFICANT CHANGE UP (ref 8.4–10.5)
CHLORIDE SERPL-SCNC: 105 MMOL/L — SIGNIFICANT CHANGE UP (ref 98–107)
CO2 SERPL-SCNC: 22 MMOL/L — SIGNIFICANT CHANGE UP (ref 22–31)
CREAT SERPL-MCNC: 0.74 MG/DL — SIGNIFICANT CHANGE UP (ref 0.5–1.3)
EOSINOPHIL # BLD AUTO: 0.38 K/UL — SIGNIFICANT CHANGE UP (ref 0–0.5)
EOSINOPHIL NFR BLD AUTO: 6.8 % — HIGH (ref 0–6)
GLUCOSE SERPL-MCNC: 93 MG/DL — SIGNIFICANT CHANGE UP (ref 70–99)
HCT VFR BLD CALC: 23 % — LOW (ref 34.5–45)
HGB BLD-MCNC: 7.6 G/DL — LOW (ref 11.5–15.5)
IMM GRANULOCYTES NFR BLD AUTO: 0.4 % — SIGNIFICANT CHANGE UP (ref 0–1.5)
LYMPHOCYTES # BLD AUTO: 2.94 K/UL — SIGNIFICANT CHANGE UP (ref 1–3.3)
LYMPHOCYTES # BLD AUTO: 52.6 % — HIGH (ref 13–44)
MCHC RBC-ENTMCNC: 27 PG — SIGNIFICANT CHANGE UP (ref 27–34)
MCHC RBC-ENTMCNC: 33 % — SIGNIFICANT CHANGE UP (ref 32–36)
MCV RBC AUTO: 81.9 FL — SIGNIFICANT CHANGE UP (ref 80–100)
MONOCYTES # BLD AUTO: 0.61 K/UL — SIGNIFICANT CHANGE UP (ref 0–0.9)
MONOCYTES NFR BLD AUTO: 10.9 % — SIGNIFICANT CHANGE UP (ref 2–14)
NEUTROPHILS # BLD AUTO: 1.59 K/UL — LOW (ref 1.8–7.4)
NEUTROPHILS NFR BLD AUTO: 28.4 % — LOW (ref 43–77)
NRBC # FLD: 0.15 K/UL — SIGNIFICANT CHANGE UP (ref 0–0)
NRBC FLD-RTO: 2.7 — SIGNIFICANT CHANGE UP
PLATELET # BLD AUTO: 213 K/UL — SIGNIFICANT CHANGE UP (ref 150–400)
PMV BLD: 9.4 FL — SIGNIFICANT CHANGE UP (ref 7–13)
POTASSIUM SERPL-MCNC: 5.2 MMOL/L — SIGNIFICANT CHANGE UP (ref 3.5–5.3)
POTASSIUM SERPL-SCNC: 5.2 MMOL/L — SIGNIFICANT CHANGE UP (ref 3.5–5.3)
PROT SERPL-MCNC: 8.4 G/DL — HIGH (ref 6–8.3)
RBC # BLD: 2.81 M/UL — LOW (ref 3.8–5.2)
RBC # FLD: 14.1 % — SIGNIFICANT CHANGE UP (ref 10.3–14.5)
RETICS #: 110 K/UL — SIGNIFICANT CHANGE UP (ref 25–125)
RETICS/RBC NFR: 3.9 % — HIGH (ref 0.5–2.5)
SODIUM SERPL-SCNC: 138 MMOL/L — SIGNIFICANT CHANGE UP (ref 135–145)
WBC # BLD: 5.59 K/UL — SIGNIFICANT CHANGE UP (ref 3.8–10.5)
WBC # FLD AUTO: 5.59 K/UL — SIGNIFICANT CHANGE UP (ref 3.8–10.5)

## 2019-09-27 PROCEDURE — 71046 X-RAY EXAM CHEST 2 VIEWS: CPT | Mod: 26

## 2019-09-27 RX ORDER — MORPHINE SULFATE 50 MG/1
6 CAPSULE, EXTENDED RELEASE ORAL ONCE
Refills: 0 | Status: DISCONTINUED | OUTPATIENT
Start: 2019-09-27 | End: 2019-09-27

## 2019-09-27 RX ORDER — DIPHENHYDRAMINE HCL 50 MG
25 CAPSULE ORAL ONCE
Refills: 0 | Status: COMPLETED | OUTPATIENT
Start: 2019-09-27 | End: 2019-09-27

## 2019-09-27 RX ORDER — SODIUM CHLORIDE 9 MG/ML
1000 INJECTION INTRAMUSCULAR; INTRAVENOUS; SUBCUTANEOUS ONCE
Refills: 0 | Status: COMPLETED | OUTPATIENT
Start: 2019-09-27 | End: 2019-09-27

## 2019-09-27 RX ADMIN — Medication 25 MILLIGRAM(S): at 21:52

## 2019-09-27 RX ADMIN — MORPHINE SULFATE 6 MILLIGRAM(S): 50 CAPSULE, EXTENDED RELEASE ORAL at 22:10

## 2019-09-27 RX ADMIN — Medication 25 MILLIGRAM(S): at 23:47

## 2019-09-27 RX ADMIN — MORPHINE SULFATE 6 MILLIGRAM(S): 50 CAPSULE, EXTENDED RELEASE ORAL at 21:51

## 2019-09-27 RX ADMIN — MORPHINE SULFATE 6 MILLIGRAM(S): 50 CAPSULE, EXTENDED RELEASE ORAL at 23:38

## 2019-09-27 RX ADMIN — SODIUM CHLORIDE 1000 MILLILITER(S): 9 INJECTION INTRAMUSCULAR; INTRAVENOUS; SUBCUTANEOUS at 21:53

## 2019-09-27 NOTE — ED PROVIDER NOTE - ATTENDING CONTRIBUTION TO CARE
pt with sickle cell crisis in setting of URI, will check basics + cxr, pain control, reassess  GEN - NAD; well appearing; A+O x3   HEAD - NC/AT     EYES - EOMI, no conjunctival pallor, no scleral icterus  ENT -   mucous membranes  moist , no discharge      NECK - Neck supple  PULM - CTA b/l,  symmetric breath sounds  COR -  RRR, S1 S2, no murmurs  ABD - , ND, NT, soft, no guarding, no rebound, no masses    BACK - no CVA tenderness, nontender spine     EXTREMS - no edema, no deformity, warm and well perfused    SKIN - no rash or bruising      NEUROLOGIC - alert, sensation nl, motor 5/5 RUE/LUE/RLE/LLE

## 2019-09-27 NOTE — ED PROVIDER NOTE - OBJECTIVE STATEMENT
Yeison Jones MD: 42 yo F PMH of Sickle Cell Disease, Acute chest syndrome p/w worsening legs, arm, back and head pain for the past two weeks. Pt states that it feel like her usual Sickle cell crisis. No aggravating factor and not relieve by her home morphine. pt also states that she has had dry cough and report that her kids have been sick with URI symptom. Pt also report intermittent CP only when she coughs. Pt denies any fever, SOB, abd pain, blood in stool, dysuria

## 2019-09-27 NOTE — ED ADULT NURSE NOTE - OBJECTIVE STATEMENT
pt on bed aox3 c/o low back, bilateral leg and shoulder pain for 2 weeks, Dx with Sickle cell. typical symptoms when in crisis, on and off with Headache chest pressure when coughing, white sputum denies SOB KALI dizziness N V palpitation abdominal pain. IV lock placed and blood drawn and sent, placed on CM Sinus Rhythm, Waiting for MD/Residents to evaluate the pt. for further clinical testing. Will continue to monitor

## 2019-09-27 NOTE — ED PROVIDER NOTE - PHYSICAL EXAMINATION
Gen: AAOx3, non-toxic  Head: NCAT  HEENT: EOMI, PERRLA, oral mucosa moist, normal conjunctiva  Lung: CTAB, no respiratory distress, no wheezes/rhonchi/rales B/L, speaking in full sentences  CV: RRR, no murmurs, rubs or gallops  Abd: soft, NTND, no guarding, no CVA tenderness, no rebound tenderness  MSK: no visible deformities, full range of motion of all 4 exts  Neuro: No focal sensory or motor deficits  Skin: Warm, well perfused, no rash  Psych: normal affect.   ~Yeison Jones MD

## 2019-09-27 NOTE — ED PROVIDER NOTE - NS ED ROS FT
GENERAL: No fever or chills, EYES: no change in vision, HEENT: no trouble speaking, CARDIAC:+ chest pain, palpitation PULMONARY: + cough or SOB, GI: no abdominal pain, no nausea, no vomiting, no diarrhea or constipation, : No changes in urination, SKIN: no rashes, NEURO: no headache,  MSK: No muscle pain ~Yeison Jones MD

## 2019-09-28 DIAGNOSIS — D57.00 HB-SS DISEASE WITH CRISIS, UNSPECIFIED: ICD-10-CM

## 2019-09-28 DIAGNOSIS — E55.9 VITAMIN D DEFICIENCY, UNSPECIFIED: ICD-10-CM

## 2019-09-28 DIAGNOSIS — Z29.9 ENCOUNTER FOR PROPHYLACTIC MEASURES, UNSPECIFIED: ICD-10-CM

## 2019-09-28 LAB
ANION GAP SERPL CALC-SCNC: 8 MMO/L — SIGNIFICANT CHANGE UP (ref 7–14)
ANISOCYTOSIS BLD QL: SIGNIFICANT CHANGE UP
B PERT DNA SPEC QL NAA+PROBE: NOT DETECTED — SIGNIFICANT CHANGE UP
BASOPHILS NFR SPEC: 0.9 % — SIGNIFICANT CHANGE UP (ref 0–2)
BLASTS # FLD: 0 % — SIGNIFICANT CHANGE UP (ref 0–0)
BUN SERPL-MCNC: 5 MG/DL — LOW (ref 7–23)
C PNEUM DNA SPEC QL NAA+PROBE: NOT DETECTED — SIGNIFICANT CHANGE UP
CALCIUM SERPL-MCNC: 9.2 MG/DL — SIGNIFICANT CHANGE UP (ref 8.4–10.5)
CHLORIDE SERPL-SCNC: 106 MMOL/L — SIGNIFICANT CHANGE UP (ref 98–107)
CO2 SERPL-SCNC: 26 MMOL/L — SIGNIFICANT CHANGE UP (ref 22–31)
CREAT SERPL-MCNC: 0.81 MG/DL — SIGNIFICANT CHANGE UP (ref 0.5–1.3)
EOSINOPHIL NFR FLD: 6.1 % — HIGH (ref 0–6)
FLUAV H1 2009 PAND RNA SPEC QL NAA+PROBE: NOT DETECTED — SIGNIFICANT CHANGE UP
FLUAV H1 RNA SPEC QL NAA+PROBE: NOT DETECTED — SIGNIFICANT CHANGE UP
FLUAV H3 RNA SPEC QL NAA+PROBE: NOT DETECTED — SIGNIFICANT CHANGE UP
FLUAV SUBTYP SPEC NAA+PROBE: NOT DETECTED — SIGNIFICANT CHANGE UP
FLUBV RNA SPEC QL NAA+PROBE: NOT DETECTED — SIGNIFICANT CHANGE UP
GIANT PLATELETS BLD QL SMEAR: PRESENT — SIGNIFICANT CHANGE UP
GLUCOSE SERPL-MCNC: 92 MG/DL — SIGNIFICANT CHANGE UP (ref 70–99)
HADV DNA SPEC QL NAA+PROBE: NOT DETECTED — SIGNIFICANT CHANGE UP
HAPTOGLOB SERPL-MCNC: < 20 MG/DL — LOW (ref 34–200)
HCOV PNL SPEC NAA+PROBE: SIGNIFICANT CHANGE UP
HCT VFR BLD CALC: 22.5 % — LOW (ref 34.5–45)
HGB BLD-MCNC: 7.4 G/DL — LOW (ref 11.5–15.5)
HMPV RNA SPEC QL NAA+PROBE: NOT DETECTED — SIGNIFICANT CHANGE UP
HPIV1 RNA SPEC QL NAA+PROBE: NOT DETECTED — SIGNIFICANT CHANGE UP
HPIV2 RNA SPEC QL NAA+PROBE: NOT DETECTED — SIGNIFICANT CHANGE UP
HPIV3 RNA SPEC QL NAA+PROBE: NOT DETECTED — SIGNIFICANT CHANGE UP
HPIV4 RNA SPEC QL NAA+PROBE: NOT DETECTED — SIGNIFICANT CHANGE UP
HYPOCHROMIA BLD QL: SIGNIFICANT CHANGE UP
LDH SERPL L TO P-CCNC: 272 U/L — HIGH (ref 135–225)
LYMPHOCYTES NFR SPEC AUTO: 56.1 % — HIGH (ref 13–44)
MACROCYTES BLD QL: SIGNIFICANT CHANGE UP
MCHC RBC-ENTMCNC: 26.7 PG — LOW (ref 27–34)
MCHC RBC-ENTMCNC: 32.9 % — SIGNIFICANT CHANGE UP (ref 32–36)
MCV RBC AUTO: 81.2 FL — SIGNIFICANT CHANGE UP (ref 80–100)
METAMYELOCYTES # FLD: 0.9 % — SIGNIFICANT CHANGE UP (ref 0–1)
MICROCYTES BLD QL: SIGNIFICANT CHANGE UP
MONOCYTES NFR BLD: 5.3 % — SIGNIFICANT CHANGE UP (ref 2–9)
MYELOCYTES NFR BLD: 0 % — SIGNIFICANT CHANGE UP (ref 0–0)
NEUTROPHIL AB SER-ACNC: 28.1 % — LOW (ref 43–77)
NEUTS BAND # BLD: 0 % — SIGNIFICANT CHANGE UP (ref 0–6)
NRBC # BLD: 4 /100WBC — SIGNIFICANT CHANGE UP
NRBC # FLD: 0.1 K/UL — SIGNIFICANT CHANGE UP (ref 0–0)
NRBC FLD-RTO: 2.3 — SIGNIFICANT CHANGE UP
OTHER - HEMATOLOGY %: 0 — SIGNIFICANT CHANGE UP
PLATELET # BLD AUTO: 184 K/UL — SIGNIFICANT CHANGE UP (ref 150–400)
PLATELET COUNT - ESTIMATE: NORMAL — SIGNIFICANT CHANGE UP
PMV BLD: 9.2 FL — SIGNIFICANT CHANGE UP (ref 7–13)
POIKILOCYTOSIS BLD QL AUTO: SIGNIFICANT CHANGE UP
POLYCHROMASIA BLD QL SMEAR: SIGNIFICANT CHANGE UP
POTASSIUM SERPL-MCNC: 3.8 MMOL/L — SIGNIFICANT CHANGE UP (ref 3.5–5.3)
POTASSIUM SERPL-SCNC: 3.8 MMOL/L — SIGNIFICANT CHANGE UP (ref 3.5–5.3)
PROMYELOCYTES # FLD: 0 % — SIGNIFICANT CHANGE UP (ref 0–0)
RBC # BLD: 2.77 M/UL — LOW (ref 3.8–5.2)
RBC # FLD: 13.9 % — SIGNIFICANT CHANGE UP (ref 10.3–14.5)
RETICS #: 98 K/UL — SIGNIFICANT CHANGE UP (ref 25–125)
RETICS/RBC NFR: 3.5 % — HIGH (ref 0.5–2.5)
RSV RNA SPEC QL NAA+PROBE: NOT DETECTED — SIGNIFICANT CHANGE UP
RV+EV RNA SPEC QL NAA+PROBE: NOT DETECTED — SIGNIFICANT CHANGE UP
SICKLE CELLS BLD QL SMEAR: SIGNIFICANT CHANGE UP
SMUDGE CELLS # BLD: PRESENT — SIGNIFICANT CHANGE UP
SODIUM SERPL-SCNC: 140 MMOL/L — SIGNIFICANT CHANGE UP (ref 135–145)
TARGETS BLD QL SMEAR: SIGNIFICANT CHANGE UP
VARIANT LYMPHS # BLD: 2.6 % — SIGNIFICANT CHANGE UP
WBC # BLD: 4.31 K/UL — SIGNIFICANT CHANGE UP (ref 3.8–10.5)
WBC # FLD AUTO: 4.31 K/UL — SIGNIFICANT CHANGE UP (ref 3.8–10.5)

## 2019-09-28 PROCEDURE — 99223 1ST HOSP IP/OBS HIGH 75: CPT

## 2019-09-28 RX ORDER — SENNA PLUS 8.6 MG/1
2 TABLET ORAL
Qty: 0 | Refills: 0 | DISCHARGE

## 2019-09-28 RX ORDER — HYDROMORPHONE HYDROCHLORIDE 2 MG/ML
1 INJECTION INTRAMUSCULAR; INTRAVENOUS; SUBCUTANEOUS ONCE
Refills: 0 | Status: DISCONTINUED | OUTPATIENT
Start: 2019-09-28 | End: 2019-09-28

## 2019-09-28 RX ORDER — CHOLECALCIFEROL (VITAMIN D3) 125 MCG
1 CAPSULE ORAL
Qty: 0 | Refills: 0 | DISCHARGE

## 2019-09-28 RX ORDER — POLYETHYLENE GLYCOL 3350 17 G/17G
17 POWDER, FOR SOLUTION ORAL AT BEDTIME
Refills: 0 | Status: DISCONTINUED | OUTPATIENT
Start: 2019-09-28 | End: 2019-09-30

## 2019-09-28 RX ORDER — DOCUSATE SODIUM 100 MG
100 CAPSULE ORAL
Refills: 0 | Status: DISCONTINUED | OUTPATIENT
Start: 2019-09-28 | End: 2019-09-30

## 2019-09-28 RX ORDER — KETOROLAC TROMETHAMINE 30 MG/ML
15 SYRINGE (ML) INJECTION EVERY 6 HOURS
Refills: 0 | Status: DISCONTINUED | OUTPATIENT
Start: 2019-09-28 | End: 2019-09-28

## 2019-09-28 RX ORDER — ENOXAPARIN SODIUM 100 MG/ML
40 INJECTION SUBCUTANEOUS DAILY
Refills: 0 | Status: DISCONTINUED | OUTPATIENT
Start: 2019-09-28 | End: 2019-09-30

## 2019-09-28 RX ORDER — FOLIC ACID 0.8 MG
1 TABLET ORAL DAILY
Refills: 0 | Status: DISCONTINUED | OUTPATIENT
Start: 2019-09-28 | End: 2019-09-30

## 2019-09-28 RX ORDER — DIPHENHYDRAMINE HCL 50 MG
25 CAPSULE ORAL ONCE
Refills: 0 | Status: COMPLETED | OUTPATIENT
Start: 2019-09-28 | End: 2019-09-28

## 2019-09-28 RX ORDER — HYDROMORPHONE HYDROCHLORIDE 2 MG/ML
30 INJECTION INTRAMUSCULAR; INTRAVENOUS; SUBCUTANEOUS
Refills: 0 | Status: DISCONTINUED | OUTPATIENT
Start: 2019-09-28 | End: 2019-09-28

## 2019-09-28 RX ORDER — HYDROMORPHONE HYDROCHLORIDE 2 MG/ML
30 INJECTION INTRAMUSCULAR; INTRAVENOUS; SUBCUTANEOUS
Refills: 0 | Status: DISCONTINUED | OUTPATIENT
Start: 2019-09-28 | End: 2019-09-30

## 2019-09-28 RX ORDER — MORPHINE SULFATE 50 MG/1
1 CAPSULE, EXTENDED RELEASE ORAL
Qty: 0 | Refills: 0 | DISCHARGE

## 2019-09-28 RX ORDER — HYDROXYUREA 500 MG/1
1 CAPSULE ORAL
Qty: 0 | Refills: 0 | DISCHARGE

## 2019-09-28 RX ORDER — CHOLECALCIFEROL (VITAMIN D3) 125 MCG
1000 CAPSULE ORAL DAILY
Refills: 0 | Status: DISCONTINUED | OUTPATIENT
Start: 2019-09-28 | End: 2019-09-30

## 2019-09-28 RX ORDER — NALOXONE HYDROCHLORIDE 4 MG/.1ML
0.1 SPRAY NASAL
Refills: 0 | Status: DISCONTINUED | OUTPATIENT
Start: 2019-09-28 | End: 2019-09-30

## 2019-09-28 RX ORDER — MORPHINE SULFATE 50 MG/1
6 CAPSULE, EXTENDED RELEASE ORAL ONCE
Refills: 0 | Status: DISCONTINUED | OUTPATIENT
Start: 2019-09-28 | End: 2019-09-28

## 2019-09-28 RX ORDER — ONDANSETRON 8 MG/1
4 TABLET, FILM COATED ORAL EVERY 6 HOURS
Refills: 0 | Status: DISCONTINUED | OUTPATIENT
Start: 2019-09-28 | End: 2019-09-30

## 2019-09-28 RX ORDER — SODIUM CHLORIDE 9 MG/ML
1000 INJECTION, SOLUTION INTRAVENOUS
Refills: 0 | Status: DISCONTINUED | OUTPATIENT
Start: 2019-09-28 | End: 2019-09-30

## 2019-09-28 RX ORDER — HYDROXYUREA 500 MG/1
1000 CAPSULE ORAL
Qty: 0 | Refills: 0 | DISCHARGE

## 2019-09-28 RX ORDER — HYDROXYUREA 500 MG/1
1000 CAPSULE ORAL DAILY
Refills: 0 | Status: DISCONTINUED | OUTPATIENT
Start: 2019-09-28 | End: 2019-09-30

## 2019-09-28 RX ORDER — FENTANYL CITRATE 50 UG/ML
1 INJECTION INTRAVENOUS
Qty: 0 | Refills: 0 | DISCHARGE

## 2019-09-28 RX ORDER — FENTANYL CITRATE 50 UG/ML
1 INJECTION INTRAVENOUS
Refills: 0 | Status: DISCONTINUED | OUTPATIENT
Start: 2019-09-28 | End: 2019-09-30

## 2019-09-28 RX ORDER — MORPHINE SULFATE 50 MG/1
60 CAPSULE, EXTENDED RELEASE ORAL
Refills: 0 | Status: DISCONTINUED | OUTPATIENT
Start: 2019-09-28 | End: 2019-09-30

## 2019-09-28 RX ORDER — SENNA PLUS 8.6 MG/1
2 TABLET ORAL AT BEDTIME
Refills: 0 | Status: DISCONTINUED | OUTPATIENT
Start: 2019-09-28 | End: 2019-09-30

## 2019-09-28 RX ORDER — BENZOYL PEROXIDE MICRONIZED 5.8 %
1 TOWELETTE (EA) TOPICAL
Qty: 0 | Refills: 0 | DISCHARGE

## 2019-09-28 RX ADMIN — Medication 25 MILLIGRAM(S): at 06:03

## 2019-09-28 RX ADMIN — HYDROXYUREA 1000 MILLIGRAM(S): 500 CAPSULE ORAL at 12:31

## 2019-09-28 RX ADMIN — Medication 1000 UNIT(S): at 11:13

## 2019-09-28 RX ADMIN — HYDROMORPHONE HYDROCHLORIDE 30 MILLILITER(S): 2 INJECTION INTRAMUSCULAR; INTRAVENOUS; SUBCUTANEOUS at 09:40

## 2019-09-28 RX ADMIN — MORPHINE SULFATE 60 MILLIGRAM(S): 50 CAPSULE, EXTENDED RELEASE ORAL at 18:32

## 2019-09-28 RX ADMIN — FENTANYL CITRATE 1 PATCH: 50 INJECTION INTRAVENOUS at 11:13

## 2019-09-28 RX ADMIN — HYDROMORPHONE HYDROCHLORIDE 30 MILLILITER(S): 2 INJECTION INTRAMUSCULAR; INTRAVENOUS; SUBCUTANEOUS at 18:49

## 2019-09-28 RX ADMIN — Medication 100 MILLIGRAM(S): at 18:34

## 2019-09-28 RX ADMIN — ENOXAPARIN SODIUM 40 MILLIGRAM(S): 100 INJECTION SUBCUTANEOUS at 11:17

## 2019-09-28 RX ADMIN — SODIUM CHLORIDE 125 MILLILITER(S): 9 INJECTION, SOLUTION INTRAVENOUS at 18:34

## 2019-09-28 RX ADMIN — SODIUM CHLORIDE 125 MILLILITER(S): 9 INJECTION, SOLUTION INTRAVENOUS at 10:41

## 2019-09-28 RX ADMIN — MORPHINE SULFATE 6 MILLIGRAM(S): 50 CAPSULE, EXTENDED RELEASE ORAL at 06:15

## 2019-09-28 RX ADMIN — Medication 1 MILLIGRAM(S): at 11:17

## 2019-09-28 RX ADMIN — FENTANYL CITRATE 1 PATCH: 50 INJECTION INTRAVENOUS at 19:00

## 2019-09-28 NOTE — H&P ADULT - PROBLEM SELECTOR PLAN 1
-sickle cell disease with sickle crisis, had sick exposure (daughters with URI symptoms)  -CXR clear lungs, check RVP  -IVF 1/2 NS, c/w home meds mscontin 60 mg bid and fentanyl 50 mcg/72h, start dilaudid PCA 0.4 mg q6min, 4 h lockout 6 mg  -incentive spirometry  -bowel regimen  -trend CBC, CMP, daily LDH, retic count -sickle cell disease with sickle crisis, had sick exposure (daughters with URI symptoms)  -CXR clear lungs, check RVP  -IVF 1/2 NS, c/w home meds mscontin 60 mg bid and fentanyl 50 mcg/72h, start dilaudid PCA 0.4 mg q6min, 4 h lockout 6 mg  -incentive spirometry  -bowel regimen  -trend CBC, CMP, daily LDH, retic count  -no indication for blood transfusion at this time (pt reports last transfusion couple of years ago)

## 2019-09-28 NOTE — H&P ADULT - ASSESSMENT
42 y/o female with h/o  sickle Cell Disease, Acute chest syndrome, who presented with 2 week history of worsening pains in her back, knees, arms and head, + sick contact, admitted for acute vasoocclusive crisis.   In ED, CXR clear lungs, labs normal WBC 5.5 Hgb 7.6.

## 2019-09-28 NOTE — H&P ADULT - NSHPREVIEWOFSYSTEMS_GEN_ALL_CORE
CONSTITUTIONAL: No fever, weight loss, or fatigue  EYES: No eye pain, visual disturbances, or discharge  ENMT:  No difficulty hearing, tinnitus, vertigo; No sinus or throat pain  NECK: No pain or stiffness  BREASTS: No pain, masses, or nipple discharge  RESPIRATORY: + cough, no wheezing, chills or hemoptysis; No shortness of breath  CARDIOVASCULAR: No chest pain, palpitations, dizziness, or leg swelling  GASTROINTESTINAL: No abdominal or epigastric pain. No nausea, vomiting, or hematemesis; No diarrhea or constipation. No melena or hematochezia.  GENITOURINARY: No dysuria, frequency, hematuria, or incontinence  NEUROLOGICAL: +headaches, no memory loss, loss of strength, numbness, or tremors  SKIN: No itching, burning, rashes, or lesions   LYMPH NODES: No enlarged glands  ENDOCRINE: No heat or cold intolerance; No hair loss  MUSCULOSKELETAL: No muscle, pains in her back/knee/arm  PSYCHIATRIC: No depression, anxiety, mood swings, or difficulty sleeping  HEME/LYMPH: No easy bruising, or bleeding gums  ALLERGY AND IMMUNOLOGIC: No hives or eczema

## 2019-09-28 NOTE — H&P ADULT - NSHPSOCIALHISTORY_GEN_ALL_CORE
Profession: Licensed Beautician and [employer]  Lives with children  History of smoking; quit ~ 2012 after smoking 1ppd x 7 years  No history of alcohol abuse  No history of illegal drug use

## 2019-09-28 NOTE — H&P ADULT - HISTORY OF PRESENT ILLNESS
42 y/o female with h/o  sickle Cell Disease, Acute chest syndrome, who presented with 2 week history of worsening pains in her back, knees, arms and head.  Patient reports sick contact, her 2 daughters are sick with URI symptoms( fever and congestion). Patient has mainly dry cough with white sputum, she denies fever/chills/purulent sputum/dyspnea/chest pain/dizziness.  Patient reports she has been hydrating herself at home and taking her pain meds mscontin 60 mg bid and morphine 30 mg q6h prn and fentanyl 50 mcg/72h without improvement.   In ED, CXR clear lungs, labs normal WBC 5.5 Hgb 7.6.

## 2019-09-28 NOTE — PATIENT PROFILE ADULT - LONGEST PERIOD TOBACCO-FREE
Transitional Care Team: Initial G Note    Date of Assessment: 02/04/19  Time of Assessment:  5:40 PM    Assessment/Plan: The following opportunities were noted following assessment:   1. Advance Directive: not on file, but daughter has and will email to me for scanning. 2. Medication reconciliation was not performed.      3. Discharge Needs: will likely need SNF or inpt rehab. Gave list of post-acute SNFs to family and emphasized this is not an exhaustive list and they are free to choose, but these are facilities that have entered into partnership with [facility] and there are weekly phone calls to discuss progress of our patients towards their rehab goals.     4. Awareness of medical conditions: did not discuss tonight. She is eating dinner currently.     5. Patient's willingness to go to SNF or inpt rehab if necessary: she is willing to get stronger.     6. [facility] NP will return with [facility] calender/follow up appointments/ Ambulatory Nurse Navigation information when patient ready for discharge, as appropriate.     7. [facility] information will be given to patient and family.       8. Will follow 's documentation.     Patient education focused on readmission zones as described as: The Red Zone: High risk for readmission, days 1-21                          The Yellow Zone: Moderate risk for readmission, days 22-29                          The Green Zone: Lower risk for readmission, days 30 and after    Ida Lange is a 70 y.o. female inpatient at [facility] admitted with shortness of breath on  1/25/19. Chart reviewed by Bernarda Cook NP and [facility] Understanding of Goals) program introduced to patient/family. The Transitional Care Team bridges the gaps in care and education surrounding discharge from the acute care facility.  The objective is to empower the patient and family in taking a proactive role in the task of preventing readmission within the first thirty days after discharge from the acute care setting. The team is also involved in the efforts to reduce readmission to the acute care setting after stabilization and discharge from the acute care environment either to the skilled nursing facilities or community. The TC Team will follow patient from a distance while inpatient as well as be available for further transition disposition as needed. The ROBYN TEAM will continue to offer support during the 30- 90 day discharge from acute care setting. Will notify Ambulatory HF Nurse Navigators when D/C is pending.     Past Medical History:   Diagnosis Date    Other ill-defined conditions(661.41)     IBS    Pancreatitis        Advance Care Planning 1/25/2019   Confirm Advance Directive Yes, not on file       Primary Diagnosis  Shortness of breath  HF, NYHA Class IV  EF 20-25%% with WMA  Influenza with superimposed PNA abnormal/expand... 77 years

## 2019-09-28 NOTE — H&P ADULT - NSHPLABSRESULTS_GEN_ALL_CORE
LABS:                        7.6    5.59  )-----------( 213      ( 27 Sep 2019 21:40 )             23.0     09-27    138  |  105  |  7   ----------------------------<  93  5.2   |  22  |  0.74    Ca    9.1      27 Sep 2019 21:40    TPro  8.4<H>  /  Alb  4.6  /  TBili  1.8<H>  /  DBili  x   /  AST  53<H>      < end of copied text >    /  ALT  5   /  AlkPhos  50  09-27      EKG:    RADIOLOGY & ADDITIONAL TESTS:  < from: Xray Chest 2 Views PA/Lat (09.27.19 @ 22:08) >    IMPRESSION:   Clear lungs.

## 2019-09-28 NOTE — H&P ADULT - NSHPPHYSICALEXAM_GEN_ALL_CORE
Vital Signs Last 24 Hrs  T(C): 36.7 (28 Sep 2019 09:23), Max: 36.8 (28 Sep 2019 06:17)  T(F): 98.1 (28 Sep 2019 09:23), Max: 98.2 (28 Sep 2019 06:17)  HR: 64 (28 Sep 2019 09:23) (64 - 72)  BP: 106/71 (28 Sep 2019 09:23) (100/59 - 114/69)  BP(mean): --  RR: 14 (28 Sep 2019 09:23) (14 - 18)  SpO2: 100% (28 Sep 2019 09:23) (100% - 100%)  CAPILLARY BLOOD GLUCOSE        I&O's Summary      PHYSICAL EXAM:  GENERAL: NAD, well-developed  HEAD:  Atraumatic, Normocephalic  EYES: EOMI, PERRLA, conjunctiva and sclera clear  NECK: Supple, No JVD  CHEST/LUNG: Clear to auscultation bilaterally; No wheeze  HEART: Regular rate and rhythm; No murmurs, rubs, or gallops  ABDOMEN: Soft, Nontender, Nondistended; Bowel sounds present  EXTREMITIES:  2+ Peripheral Pulses, No clubbing, cyanosis, or edema  PSYCH: AAOx3  NEUROLOGY: non-focal  SKIN: No rashes or lesions

## 2019-09-28 NOTE — PROVIDER CONTACT NOTE (OTHER) - SITUATION
pt observed using chewing tobacco at bedside.  pt was instructed not to use the chewing tobacco any longer.  pt is axox4.

## 2019-09-28 NOTE — PROVIDER CONTACT NOTE (OTHER) - RECOMMENDATIONS
see pt, pt educated on possible interactions between chewing tobacco and other drugs she may be taking.

## 2019-09-28 NOTE — PROVIDER CONTACT NOTE (OTHER) - ACTION/TREATMENT ORDERED:
call the nurse manager or educator to get more information.    JANELLE Locke informed, instructed to "keep an eye on pt and we can't take it away from her".

## 2019-09-29 ENCOUNTER — TRANSCRIPTION ENCOUNTER (OUTPATIENT)
Age: 44
End: 2019-09-29

## 2019-09-29 LAB
ALBUMIN SERPL ELPH-MCNC: 3.5 G/DL — SIGNIFICANT CHANGE UP (ref 3.3–5)
ALP SERPL-CCNC: 42 U/L — SIGNIFICANT CHANGE UP (ref 40–120)
ALT FLD-CCNC: < 5 U/L — SIGNIFICANT CHANGE UP (ref 4–33)
ANION GAP SERPL CALC-SCNC: 10 MMO/L — SIGNIFICANT CHANGE UP (ref 7–14)
AST SERPL-CCNC: 23 U/L — SIGNIFICANT CHANGE UP (ref 4–32)
BILIRUB SERPL-MCNC: 1.3 MG/DL — HIGH (ref 0.2–1.2)
BUN SERPL-MCNC: 4 MG/DL — LOW (ref 7–23)
CALCIUM SERPL-MCNC: 8.9 MG/DL — SIGNIFICANT CHANGE UP (ref 8.4–10.5)
CHLORIDE SERPL-SCNC: 104 MMOL/L — SIGNIFICANT CHANGE UP (ref 98–107)
CO2 SERPL-SCNC: 24 MMOL/L — SIGNIFICANT CHANGE UP (ref 22–31)
CREAT SERPL-MCNC: 0.71 MG/DL — SIGNIFICANT CHANGE UP (ref 0.5–1.3)
GLUCOSE SERPL-MCNC: 88 MG/DL — SIGNIFICANT CHANGE UP (ref 70–99)
HAPTOGLOB SERPL-MCNC: < 20 MG/DL — LOW (ref 34–200)
HCT VFR BLD CALC: 22.2 % — LOW (ref 34.5–45)
HGB BLD-MCNC: 7.1 G/DL — LOW (ref 11.5–15.5)
LDH SERPL L TO P-CCNC: 234 U/L — HIGH (ref 135–225)
MAGNESIUM SERPL-MCNC: 1.5 MG/DL — LOW (ref 1.6–2.6)
MCHC RBC-ENTMCNC: 26.3 PG — LOW (ref 27–34)
MCHC RBC-ENTMCNC: 32 % — SIGNIFICANT CHANGE UP (ref 32–36)
MCV RBC AUTO: 82.2 FL — SIGNIFICANT CHANGE UP (ref 80–100)
NRBC # FLD: 0.06 K/UL — SIGNIFICANT CHANGE UP (ref 0–0)
NRBC FLD-RTO: 1.5 — SIGNIFICANT CHANGE UP
PLATELET # BLD AUTO: 157 K/UL — SIGNIFICANT CHANGE UP (ref 150–400)
PMV BLD: 9 FL — SIGNIFICANT CHANGE UP (ref 7–13)
POTASSIUM SERPL-MCNC: 3.8 MMOL/L — SIGNIFICANT CHANGE UP (ref 3.5–5.3)
POTASSIUM SERPL-SCNC: 3.8 MMOL/L — SIGNIFICANT CHANGE UP (ref 3.5–5.3)
PROT SERPL-MCNC: 6.4 G/DL — SIGNIFICANT CHANGE UP (ref 6–8.3)
RBC # BLD: 2.7 M/UL — LOW (ref 3.8–5.2)
RBC # FLD: 13.6 % — SIGNIFICANT CHANGE UP (ref 10.3–14.5)
RETICS #: 102 K/UL — SIGNIFICANT CHANGE UP (ref 25–125)
RETICS/RBC NFR: 3.8 % — HIGH (ref 0.5–2.5)
SODIUM SERPL-SCNC: 138 MMOL/L — SIGNIFICANT CHANGE UP (ref 135–145)
WBC # BLD: 4.05 K/UL — SIGNIFICANT CHANGE UP (ref 3.8–10.5)
WBC # FLD AUTO: 4.05 K/UL — SIGNIFICANT CHANGE UP (ref 3.8–10.5)

## 2019-09-29 PROCEDURE — 99233 SBSQ HOSP IP/OBS HIGH 50: CPT

## 2019-09-29 RX ORDER — MAGNESIUM SULFATE 500 MG/ML
2 VIAL (ML) INJECTION ONCE
Refills: 0 | Status: COMPLETED | OUTPATIENT
Start: 2019-09-29 | End: 2019-09-29

## 2019-09-29 RX ADMIN — Medication 1000 UNIT(S): at 13:49

## 2019-09-29 RX ADMIN — ENOXAPARIN SODIUM 40 MILLIGRAM(S): 100 INJECTION SUBCUTANEOUS at 13:50

## 2019-09-29 RX ADMIN — Medication 50 GRAM(S): at 08:56

## 2019-09-29 RX ADMIN — HYDROMORPHONE HYDROCHLORIDE 30 MILLILITER(S): 2 INJECTION INTRAMUSCULAR; INTRAVENOUS; SUBCUTANEOUS at 19:26

## 2019-09-29 RX ADMIN — MORPHINE SULFATE 60 MILLIGRAM(S): 50 CAPSULE, EXTENDED RELEASE ORAL at 05:31

## 2019-09-29 RX ADMIN — Medication 1 MILLIGRAM(S): at 13:50

## 2019-09-29 RX ADMIN — Medication 100 MILLIGRAM(S): at 05:31

## 2019-09-29 RX ADMIN — FENTANYL CITRATE 1 PATCH: 50 INJECTION INTRAVENOUS at 17:28

## 2019-09-29 RX ADMIN — HYDROXYUREA 1000 MILLIGRAM(S): 500 CAPSULE ORAL at 13:49

## 2019-09-29 RX ADMIN — MORPHINE SULFATE 60 MILLIGRAM(S): 50 CAPSULE, EXTENDED RELEASE ORAL at 06:10

## 2019-09-29 RX ADMIN — MORPHINE SULFATE 60 MILLIGRAM(S): 50 CAPSULE, EXTENDED RELEASE ORAL at 17:29

## 2019-09-29 RX ADMIN — HYDROMORPHONE HYDROCHLORIDE 30 MILLILITER(S): 2 INJECTION INTRAMUSCULAR; INTRAVENOUS; SUBCUTANEOUS at 06:36

## 2019-09-29 RX ADMIN — Medication 100 MILLIGRAM(S): at 17:27

## 2019-09-29 RX ADMIN — MORPHINE SULFATE 60 MILLIGRAM(S): 50 CAPSULE, EXTENDED RELEASE ORAL at 17:27

## 2019-09-29 RX ADMIN — FENTANYL CITRATE 1 PATCH: 50 INJECTION INTRAVENOUS at 07:35

## 2019-09-29 NOTE — DISCHARGE NOTE PROVIDER - NSDCFUSCHEDAPPT_GEN_ALL_CORE_FT
KATIE PELLETIER ; 11/01/2019 ; N[place] [facility] [id number] [facility] KATIE PELLETIER ; 11/01/2019 ; NPP Intmed OP [id number] [facility] KATIE PELLETIER ; 11/01/2019 ; NPP Intmed OP [id number] [facility]

## 2019-09-29 NOTE — PROGRESS NOTE ADULT - ASSESSMENT
42 y/o female with h/o  sickle Cell Disease, Acute chest syndrome, who presented with 2 week history of worsening pains in her back, knees, arms and head, + sick contact, admitted for acute vasoocclusive crisis.   In ED, CXR clear lungs, labs normal WBC 5.5 Hgb 7.6. 44 y/o female with h/o  sickle Cell Disease, Acute chest syndrome, who presented with 2 week history of worsening pains in her back, knees, arms and head, + sick contact, admitted for acute vasoocclusive crisis. Pain improving.

## 2019-09-29 NOTE — PROGRESS NOTE ADULT - PROBLEM SELECTOR PLAN 1
-sickle cell disease with sickle crisis, had sick exposure (daughters with URI symptoms)  -CXR clear lungs, check RVP  -IVF 1/2 NS, c/w home meds mscontin 60 mg bid and fentanyl 50 mcg/72h, start dilaudid PCA 0.4 mg q6min, 4 h lockout 6 mg  -incentive spirometry  -bowel regimen  -trend CBC, CMP, daily LDH, retic count  -no indication for blood transfusion at this time (pt reports last transfusion couple of years ago) -sickle cell disease with sickle crisis, had sick exposure (daughters with URI symptoms). RVP negative  -IVF 1/2 NS, c/w home meds mscontin 60 mg bid and fentanyl 50 mcg/72h,   - c/w dilaudid PCA 0.4 mg q6min, 4 h lockout 6 mg since pt using less than 4mg in 4 hours with reported adequate pain control.  - if pain improved, consider transitioning to oral pain meds from PCA pump  -incentive spirometry  -bowel regimen  -trend CBC, CMP, daily LDH, retic count  -no indication for blood transfusion at this time (pt reports last transfusion couple of years ago)

## 2019-09-29 NOTE — DISCHARGE NOTE PROVIDER - NSDCCPCAREPLAN_GEN_ALL_CORE_FT
PRINCIPAL DISCHARGE DIAGNOSIS  Diagnosis: Sickle cell crisis  Assessment and Plan of Treatment: Stay hydrated with water. Continue medications as prescribed. Follow up with your PCP for further evaluation and refills of pain medication. Please call to make an appointment

## 2019-09-29 NOTE — DISCHARGE NOTE PROVIDER - HOSPITAL COURSE
44 y/o female with h/o  sickle Cell Disease, Acute chest syndrome, who presented with 2 week history of worsening pains in her back, knees, arms and head, + sick contact, admitted for acute vasoocclusive crisis.         HOSPITAL COURSE    Sickle cell crisis.      - sickle cell disease with sickle crisis, had sick exposure (daughters with URI symptoms)    - CXR clear lungs, RVP neg     - IVF 1/2 NS, c/w home meds mscontin 60 mg bid and fentanyl 50 mcg/72h, start dilaudid PCA 0.4 mg q6min, 4 h lockout 6 mg    - incentive spirometry    - bowel regimen    - trend CBC, CMP, daily LDH, retic count    - no indication for blood transfusion at this time (pt reports last transfusion couple of years ago).         Vitamin D deficiency.      - c/w vit D.         Dispo: 42 y/o female with h/o  sickle Cell Disease, Acute chest syndrome, who presented with 2 week history of worsening pains in her back, knees, arms and head, + sick contact, admitted for acute vasoocclusive crisis.         HOSPITAL COURSE    Sickle cell crisis.      - sickle cell disease with sickle crisis, had sick exposure (daughters with URI symptoms)    - CXR clear lungs, RVP neg     - IVF 1/2 NS, c/w home meds mscontin 60 mg bid and fentanyl 50 mcg/72h, start dilaudid PCA 0.4 mg q6min, 4 h lockout 6 mg    - incentive spirometry    - bowel regimen    - trend CBC, CMP, daily LDH, retic count    - no indication for blood transfusion at this time (pt reports last transfusion couple of years ago).         Vitamin D deficiency.      - c/w vit D.         Dispo: stable for home with outpatient follow up within 1 week of discharge

## 2019-09-29 NOTE — PROGRESS NOTE ADULT - SUBJECTIVE AND OBJECTIVE BOX
Patient is a 43y old  Female who presents with a chief complaint of sickle crisis (28 Sep 2019 09:44)        SUBJECTIVE / OVERNIGHT EVENTS:      MEDICATIONS  (STANDING):  cholecalciferol 1000 Unit(s) Oral daily  docusate sodium 100 milliGRAM(s) Oral two times a day  enoxaparin Injectable 40 milliGRAM(s) SubCutaneous daily  fentaNYL   Patch  50 MICROgram(s)/Hr 1 Patch Transdermal every 72 hours  folic acid 1 milliGRAM(s) Oral daily  HYDROmorphone PCA (1 mG/mL) 30 milliLiter(s) PCA Continuous PCA Continuous  hydroxyurea 1000 milliGRAM(s) Oral daily  morphine ER Tablet 60 milliGRAM(s) Oral two times a day  polyethylene glycol 3350 17 Gram(s) Oral at bedtime  senna 2 Tablet(s) Oral at bedtime  sodium chloride 0.45%. 1000 milliLiter(s) (125 mL/Hr) IV Continuous <Continuous>    MEDICATIONS  (PRN):  ketorolac   Injectable 15 milliGRAM(s) IV Push every 6 hours PRN moderate to severe pain  naloxone Injectable 0.1 milliGRAM(s) IV Push every 3 minutes PRN For ANY of the following changes in patient status:  A. RR LESS THAN 10 breaths per minute, B. Oxygen saturation LESS THAN 90%, C. Sedation score of 6  ondansetron Injectable 4 milliGRAM(s) IV Push every 6 hours PRN Nausea      Vital Signs Last 24 Hrs  T(C): 36.8 (29 Sep 2019 14:24), Max: 37.4 (28 Sep 2019 19:34)  T(F): 98.3 (29 Sep 2019 14:24), Max: 99.3 (28 Sep 2019 19:34)  HR: 71 (29 Sep 2019 14:24) (60 - 71)  BP: 108/57 (29 Sep 2019 14:24) (96/61 - 113/72)  BP(mean): --  RR: 16 (29 Sep 2019 14:24) (12 - 17)  SpO2: 98% (29 Sep 2019 14:24) (96% - 100%)  CAPILLARY BLOOD GLUCOSE        I&O's Summary    28 Sep 2019 07:01  -  29 Sep 2019 07:00  --------------------------------------------------------  IN: 1650 mL / OUT: 0 mL / NET: 1650 mL          PHYSICAL EXAM  GENERAL: NAD, well-developed  HEAD:  Atraumatic, Normocephalic  EYES: EOMI, PERRLA, conjunctiva and sclera clear  NECK: Supple, No JVD  CHEST/LUNG: Clear to auscultation bilaterally; No wheeze  HEART: Regular rate and rhythm; No murmurs, rubs, or gallops  ABDOMEN: Soft, Nontender, Nondistended; Bowel sounds present  EXTREMITIES:  2+ Peripheral Pulses, No clubbing, cyanosis, or edema  PSYCH: AAOx3  SKIN: No rashes or lesions    LABS:                        7.1    4.05  )-----------( 157      ( 29 Sep 2019 06:16 )             22.2     09-29    138  |  104  |  4<L>  ----------------------------<  88  3.8   |  24  |  0.71    Ca    8.9      29 Sep 2019 06:16  Mg     1.5     09-29    TPro  6.4  /  Alb  3.5  /  TBili  1.3<H>  /  DBili  x   /  AST  23  /  ALT  < 5  /  AlkPhos  42  09-29              RADIOLOGY & ADDITIONAL TESTS:    Imaging Personally Reviewed:  Consultant(s) Notes Reviewed:    Care Discussed with Consultants/Other Providers: Patient is a 43y old  Female who presents with a chief complaint of sickle crisis (28 Sep 2019 09:44)      SUBJECTIVE / OVERNIGHT EVENTS:  Patient reports pain controlled on current pain medication regimen. No chest pain or SOB, no n/v or abdominal pain. No LE swelling    MEDICATIONS  (STANDING):  cholecalciferol 1000 Unit(s) Oral daily  docusate sodium 100 milliGRAM(s) Oral two times a day  enoxaparin Injectable 40 milliGRAM(s) SubCutaneous daily  fentaNYL   Patch  50 MICROgram(s)/Hr 1 Patch Transdermal every 72 hours  folic acid 1 milliGRAM(s) Oral daily  HYDROmorphone PCA (1 mG/mL) 30 milliLiter(s) PCA Continuous PCA Continuous  hydroxyurea 1000 milliGRAM(s) Oral daily  morphine ER Tablet 60 milliGRAM(s) Oral two times a day  polyethylene glycol 3350 17 Gram(s) Oral at bedtime  senna 2 Tablet(s) Oral at bedtime  sodium chloride 0.45%. 1000 milliLiter(s) (125 mL/Hr) IV Continuous <Continuous>    MEDICATIONS  (PRN):  ketorolac   Injectable 15 milliGRAM(s) IV Push every 6 hours PRN moderate to severe pain  naloxone Injectable 0.1 milliGRAM(s) IV Push every 3 minutes PRN For ANY of the following changes in patient status:  A. RR LESS THAN 10 breaths per minute, B. Oxygen saturation LESS THAN 90%, C. Sedation score of 6  ondansetron Injectable 4 milliGRAM(s) IV Push every 6 hours PRN Nausea      Vital Signs Last 24 Hrs  T(C): 36.8 (29 Sep 2019 14:24), Max: 37.4 (28 Sep 2019 19:34)  T(F): 98.3 (29 Sep 2019 14:24), Max: 99.3 (28 Sep 2019 19:34)  HR: 71 (29 Sep 2019 14:24) (60 - 71)  BP: 108/57 (29 Sep 2019 14:24) (96/61 - 113/72)  BP(mean): --  RR: 16 (29 Sep 2019 14:24) (12 - 17)  SpO2: 98% (29 Sep 2019 14:24) (96% - 100%)  CAPILLARY BLOOD GLUCOSE        I&O's Summary    28 Sep 2019 07:01  -  29 Sep 2019 07:00  --------------------------------------------------------  IN: 1650 mL / OUT: 0 mL / NET: 1650 mL          PHYSICAL EXAM  GENERAL: NAD, thin framed  CHEST/LUNG: Clear to auscultation bilaterally; No wheeze  HEART: Regular rate and rhythm; +systolic murmur  ABDOMEN: Soft, Nontender, Nondistended; Bowel sounds present  EXTREMITIES:  2+ Peripheral Pulses, No clubbing, cyanosis, or edema  PSYCH: AAOx3  SKIN: No rashes or lesions    LABS:                        7.1    4.05  )-----------( 157      ( 29 Sep 2019 06:16 )             22.2     09-29    138  |  104  |  4<L>  ----------------------------<  88  3.8   |  24  |  0.71    Ca    8.9      29 Sep 2019 06:16  Mg     1.5     09-29    TPro  6.4  /  Alb  3.5  /  TBili  1.3<H>  /  DBili  x   /  AST  23  /  ALT  < 5  /  AlkPhos  42  09-29              RADIOLOGY & ADDITIONAL TESTS:    Imaging Personally Reviewed:  Consultant(s) Notes Reviewed:    Care Discussed with Consultants/Other Providers:

## 2019-09-29 NOTE — PROGRESS NOTE ADULT - PROBLEM SELECTOR PLAN 3
lovenox sc for dvt ppx lovenox sc for dvt ppx    DISPO: If pain controlled on oral regimen or resolved, patient possibly to be discharged tomorrow

## 2019-09-30 ENCOUNTER — TRANSCRIPTION ENCOUNTER (OUTPATIENT)
Age: 44
End: 2019-09-30

## 2019-09-30 VITALS
DIASTOLIC BLOOD PRESSURE: 67 MMHG | RESPIRATION RATE: 17 BRPM | SYSTOLIC BLOOD PRESSURE: 108 MMHG | HEART RATE: 72 BPM | OXYGEN SATURATION: 100 % | TEMPERATURE: 99 F

## 2019-09-30 LAB
ALBUMIN SERPL ELPH-MCNC: 3.4 G/DL — SIGNIFICANT CHANGE UP (ref 3.3–5)
ALP SERPL-CCNC: 44 U/L — SIGNIFICANT CHANGE UP (ref 40–120)
ALT FLD-CCNC: 6 U/L — SIGNIFICANT CHANGE UP (ref 4–33)
ANION GAP SERPL CALC-SCNC: 11 MMO/L — SIGNIFICANT CHANGE UP (ref 7–14)
AST SERPL-CCNC: 24 U/L — SIGNIFICANT CHANGE UP (ref 4–32)
BILIRUB SERPL-MCNC: 1.5 MG/DL — HIGH (ref 0.2–1.2)
BUN SERPL-MCNC: 6 MG/DL — LOW (ref 7–23)
CALCIUM SERPL-MCNC: 8.9 MG/DL — SIGNIFICANT CHANGE UP (ref 8.4–10.5)
CHLORIDE SERPL-SCNC: 104 MMOL/L — SIGNIFICANT CHANGE UP (ref 98–107)
CO2 SERPL-SCNC: 23 MMOL/L — SIGNIFICANT CHANGE UP (ref 22–31)
CREAT SERPL-MCNC: 0.73 MG/DL — SIGNIFICANT CHANGE UP (ref 0.5–1.3)
GLUCOSE SERPL-MCNC: 88 MG/DL — SIGNIFICANT CHANGE UP (ref 70–99)
HCT VFR BLD CALC: 20.3 % — CRITICAL LOW (ref 34.5–45)
HGB BLD-MCNC: 6.9 G/DL — CRITICAL LOW (ref 11.5–15.5)
LDH SERPL L TO P-CCNC: 247 U/L — HIGH (ref 135–225)
MAGNESIUM SERPL-MCNC: 1.6 MG/DL — SIGNIFICANT CHANGE UP (ref 1.6–2.6)
MCHC RBC-ENTMCNC: 27.1 PG — SIGNIFICANT CHANGE UP (ref 27–34)
MCHC RBC-ENTMCNC: 34 % — SIGNIFICANT CHANGE UP (ref 32–36)
MCV RBC AUTO: 79.6 FL — LOW (ref 80–100)
NRBC # FLD: 0.02 K/UL — SIGNIFICANT CHANGE UP (ref 0–0)
PHOSPHATE SERPL-MCNC: 3.7 MG/DL — SIGNIFICANT CHANGE UP (ref 2.5–4.5)
PLATELET # BLD AUTO: 151 K/UL — SIGNIFICANT CHANGE UP (ref 150–400)
PMV BLD: 9.1 FL — SIGNIFICANT CHANGE UP (ref 7–13)
POTASSIUM SERPL-MCNC: 3.7 MMOL/L — SIGNIFICANT CHANGE UP (ref 3.5–5.3)
POTASSIUM SERPL-SCNC: 3.7 MMOL/L — SIGNIFICANT CHANGE UP (ref 3.5–5.3)
PROT SERPL-MCNC: 6.7 G/DL — SIGNIFICANT CHANGE UP (ref 6–8.3)
RBC # BLD: 2.55 M/UL — LOW (ref 3.8–5.2)
RBC # FLD: 13.4 % — SIGNIFICANT CHANGE UP (ref 10.3–14.5)
RETICS #: 81 K/UL — SIGNIFICANT CHANGE UP (ref 25–125)
RETICS/RBC NFR: 3.1 % — HIGH (ref 0.5–2.5)
SODIUM SERPL-SCNC: 138 MMOL/L — SIGNIFICANT CHANGE UP (ref 135–145)
WBC # BLD: 3.67 K/UL — LOW (ref 3.8–10.5)
WBC # FLD AUTO: 3.67 K/UL — LOW (ref 3.8–10.5)

## 2019-09-30 PROCEDURE — 99239 HOSP IP/OBS DSCHRG MGMT >30: CPT

## 2019-09-30 RX ORDER — SODIUM CHLORIDE 9 MG/ML
1000 INJECTION, SOLUTION INTRAVENOUS
Refills: 0 | Status: DISCONTINUED | OUTPATIENT
Start: 2019-09-30 | End: 2019-09-30

## 2019-09-30 RX ORDER — MORPHINE SULFATE 50 MG/1
30 CAPSULE, EXTENDED RELEASE ORAL EVERY 4 HOURS
Refills: 0 | Status: DISCONTINUED | OUTPATIENT
Start: 2019-09-30 | End: 2019-09-30

## 2019-09-30 RX ADMIN — HYDROMORPHONE HYDROCHLORIDE 30 MILLILITER(S): 2 INJECTION INTRAMUSCULAR; INTRAVENOUS; SUBCUTANEOUS at 07:20

## 2019-09-30 RX ADMIN — Medication 100 MILLIGRAM(S): at 05:04

## 2019-09-30 RX ADMIN — MORPHINE SULFATE 60 MILLIGRAM(S): 50 CAPSULE, EXTENDED RELEASE ORAL at 05:04

## 2019-09-30 RX ADMIN — Medication 1000 UNIT(S): at 12:17

## 2019-09-30 RX ADMIN — SODIUM CHLORIDE 125 MILLILITER(S): 9 INJECTION, SOLUTION INTRAVENOUS at 05:04

## 2019-09-30 RX ADMIN — ENOXAPARIN SODIUM 40 MILLIGRAM(S): 100 INJECTION SUBCUTANEOUS at 12:18

## 2019-09-30 RX ADMIN — FENTANYL CITRATE 1 PATCH: 50 INJECTION INTRAVENOUS at 07:21

## 2019-09-30 RX ADMIN — Medication 1 MILLIGRAM(S): at 12:18

## 2019-09-30 RX ADMIN — HYDROXYUREA 1000 MILLIGRAM(S): 500 CAPSULE ORAL at 12:18

## 2019-09-30 NOTE — PROGRESS NOTE ADULT - ASSESSMENT
44 yo F history sickle Cell Disease, Acute chest syndrome, who presented with 2 week history of worsening pains in her back, knees, arms and head, + sick contact, admitted for acute vasoocclusive crisis. Pain improving.

## 2019-09-30 NOTE — PROGRESS NOTE ADULT - SUBJECTIVE AND OBJECTIVE BOX
Patient is a 43y old  Female who presents with a chief complaint of sickle crisis    SUBJECTIVE / OVERNIGHT EVENTS:    No CP, SOB, f/c/n/v  Feel her pain is controlled   Recent increase in stress     MEDICATIONS  (STANDING):  cholecalciferol 1000 Unit(s) Oral daily  docusate sodium 100 milliGRAM(s) Oral two times a day  enoxaparin Injectable 40 milliGRAM(s) SubCutaneous daily  fentaNYL   Patch  50 MICROgram(s)/Hr 1 Patch Transdermal every 72 hours  folic acid 1 milliGRAM(s) Oral daily  HYDROmorphone PCA (1 mG/mL) 30 milliLiter(s) PCA Continuous PCA Continuous  hydroxyurea 1000 milliGRAM(s) Oral daily  morphine ER Tablet 60 milliGRAM(s) Oral two times a day  polyethylene glycol 3350 17 Gram(s) Oral at bedtime  senna 2 Tablet(s) Oral at bedtime  sodium chloride 0.45%. 1000 milliLiter(s) (75 mL/Hr) IV Continuous <Continuous>    MEDICATIONS  (PRN):  naloxone Injectable 0.1 milliGRAM(s) IV Push every 3 minutes PRN For ANY of the following changes in patient status:  A. RR LESS THAN 10 breaths per minute, B. Oxygen saturation LESS THAN 90%, C. Sedation score of 6  ondansetron Injectable 4 milliGRAM(s) IV Push every 6 hours PRN Nausea    T(C): 36.9 (09-30-19 @ 09:44), Max: 37.3 (09-30-19 @ 02:25)  HR: 75 (09-30-19 @ 09:44) (65 - 75)  BP: 111/63 (09-30-19 @ 09:44) (103/63 - 111/63)  RR: 17 (09-30-19 @ 09:44) (16 - 18)  SpO2: 100% (09-30-19 @ 09:44) (98% - 100%)    PHYSICAL EXAM  GENERAL: NAD, thin framed  CHEST/LUNG: Clear to auscultation bilaterally; No wheeze  HEART: Regular rate and rhythm; +systolic murmur  ABDOMEN: Soft, Nontender, Nondistended; Bowel sounds present  EXTREMITIES:  2+ Peripheral Pulses, No clubbing, cyanosis, or edema  PSYCH: AAOx3  SKIN: No rashes or lesions      LABS:                        6.9    3.67  )-----------( 151      ( 30 Sep 2019 06:50 )             20.3     09-30    138  |  104  |  6<L>  ----------------------------<  88  3.7   |  23  |  0.73    Ca    8.9      30 Sep 2019 06:50  Phos  3.7     09-30  Mg     1.6     09-30    TPro  6.7  /  Alb  3.4  /  TBili  1.5<H>  /  DBili  x   /  AST  24  /  ALT  6   /  AlkPhos  44  09-30    Consultant(s) Notes Reviewed:    Care Discussed with Consultants/Other Providers:

## 2019-09-30 NOTE — CHART NOTE - NSCHARTNOTEFT_GEN_A_CORE
This report was requested by: Melba Dukes|Reference #: [id number]       09/18/2019 09/20/2019 morphine sulf er 60 mg tablet  60 30 [facility]     09/18/2019 09/20/2019 morphine sulfate ir 30 mg tab  480 30 [facility]     09/18/2019 09/20/2019 fentanyl 50 mcg/hr patch  10 30 [facility]     08/14/2019 08/14/2019 fentanyl 50 mcg/hr patch  10 3 [facility]     08/14/2019 08/14/2019 morphine sulf er 60 mg tablet  60 30 [facility]     08/14/2019 08/14/2019 morphine sulfate ir 30 mg tab  480 30 [facility]     07/02/2019 07/03/2019 morphine sulf er 60 mg tablet  60 30 [facility]     06/27/2019 07/01/2019 morphine sulfate ir 30 mg tab  480 30 [facility]     06/27/2019 07/01/2019 fentanyl 50 mcg/hr patch  10 30 [facility]     05/16/2019 05/18/2019 morphine sulf er 60 mg tablet  60 30 [facility]     05/16/2019 05/18/2019 fentanyl 50 mcg/hr patch  10 30 [facility]     05/16/2019 05/18/2019 morphine sulfate ir 30 mg tab  480 30 [facility]     03/29/2019 04/01/2019 fentanyl 50 mcg/hr patch  10 30 [facility]     03/29/2019 04/01/2019 morphine sulf er 60 mg tablet  60 30 [facility]     03/29/2019 04/01/2019 morphine sulfate ir 30 mg tab  480 30 [facility]     02/08/2019 02/09/2019 fentanyl 50 mcg/hr patch  10 3 Estrella Salazar MD     02/08/2019 02/09/2019 morphine sulfate ir 30 mg tab  480 30 Estrella Salazar MD     02/08/2019 02/09/2019 morphine sulf er 60 mg tablet  60 30 Estrella Salazar MD     12/04/2018 12/29/2018 morphine sulfate ir 30 mg tab  480 30 [facility]     12/04/2018 12/07/2018 [facility]     12/04/2018 12/07/2018 morphine sulf er 60 mg tablet  60 30 Aidan Morrow     11/06/2018 11/07/2018 fentanyl 50 mcg/hr patch  10 30 Aidan Morrow     11/06/2018 11/07/2018 morphine sulfate ir 30 mg tab  480 30 Aidan Morrow     11/06/2018 11/07/2018 morphine sulf er 60 mg tablet  60 30 Aidan Morrow

## 2019-09-30 NOTE — PROGRESS NOTE ADULT - PROBLEM SELECTOR PLAN 3
lovenox sc for dvt ppx    DISPO: home today, reports her father can pick her up  dispo time 35 min   has home meds, istop in chart

## 2019-09-30 NOTE — PROGRESS NOTE ADULT - PROBLEM SELECTOR PLAN 1
Sickle cell disease with sickle crisis, had sick exposure (daughters with URI symptoms). RVP negative  - IVF 1/2 NS  - c/w home meds mscontin 60 mg bid and fentanyl 50 mcg/72h--advised this is an odd regimen with 2 LA however states at home wears patch daily however doesn't always use the ER morphine and can just use the IR and if uses multiple IR dose than takes the ER etc  - dc PCA, transition to home orals   -incentive spirometry  - bowel regimen  - trend CBC, CMP, daily LDH, retic count

## 2019-09-30 NOTE — DISCHARGE NOTE NURSING/CASE MANAGEMENT/SOCIAL WORK - PATIENT PORTAL LINK FT
You can access the FollowMyHealth Patient Portal offered by [facility] by registering at the following website: [email or web address]. By joining [employer]’s FollowMyHealth portal, you will also be able to view your health information using other applications (apps) compatible with our system.

## 2019-11-01 ENCOUNTER — APPOINTMENT (OUTPATIENT)
Dept: INTERNAL MEDICINE | Facility: CLINIC | Age: 44
End: 2019-11-01

## 2020-01-17 ENCOUNTER — INPATIENT (INPATIENT)
Facility: HOSPITAL | Age: 45
LOS: 2 days | Discharge: HOME | End: 2020-01-20
Attending: HOSPITALIST | Admitting: HOSPITALIST
Payer: MEDICAID

## 2020-01-17 VITALS
DIASTOLIC BLOOD PRESSURE: 73 MMHG | SYSTOLIC BLOOD PRESSURE: 119 MMHG | HEART RATE: 81 BPM | TEMPERATURE: 98 F | RESPIRATION RATE: 16 BRPM | OXYGEN SATURATION: 98 %

## 2020-01-17 DIAGNOSIS — Z79.899 OTHER LONG TERM (CURRENT) DRUG THERAPY: ICD-10-CM

## 2020-01-17 DIAGNOSIS — R07.89 OTHER CHEST PAIN: ICD-10-CM

## 2020-01-17 DIAGNOSIS — Z29.9 ENCOUNTER FOR PROPHYLACTIC MEASURES, UNSPECIFIED: ICD-10-CM

## 2020-01-17 DIAGNOSIS — Z02.9 ENCOUNTER FOR ADMINISTRATIVE EXAMINATIONS, UNSPECIFIED: ICD-10-CM

## 2020-01-17 DIAGNOSIS — R17 UNSPECIFIED JAUNDICE: ICD-10-CM

## 2020-01-17 DIAGNOSIS — Z98.890 OTHER SPECIFIED POSTPROCEDURAL STATES: Chronic | ICD-10-CM

## 2020-01-17 DIAGNOSIS — D57.00 HB-SS DISEASE WITH CRISIS, UNSPECIFIED: ICD-10-CM

## 2020-01-17 DIAGNOSIS — Z98.891 HISTORY OF UTERINE SCAR FROM PREVIOUS SURGERY: Chronic | ICD-10-CM

## 2020-01-17 DIAGNOSIS — M25.561 PAIN IN RIGHT KNEE: ICD-10-CM

## 2020-01-17 LAB
ALBUMIN SERPL ELPH-MCNC: 4.6 G/DL — SIGNIFICANT CHANGE UP (ref 3.3–5)
ALP SERPL-CCNC: 61 U/L — SIGNIFICANT CHANGE UP (ref 40–120)
ALT FLD-CCNC: 8 U/L — SIGNIFICANT CHANGE UP (ref 4–33)
ANION GAP SERPL CALC-SCNC: 12 MMO/L — SIGNIFICANT CHANGE UP (ref 7–14)
APPEARANCE UR: CLEAR — SIGNIFICANT CHANGE UP
APTT BLD: 33 SEC — SIGNIFICANT CHANGE UP (ref 27.5–36.3)
AST SERPL-CCNC: 39 U/L — HIGH (ref 4–32)
BASOPHILS # BLD AUTO: 0.06 K/UL — SIGNIFICANT CHANGE UP (ref 0–0.2)
BASOPHILS NFR BLD AUTO: 1.2 % — SIGNIFICANT CHANGE UP (ref 0–2)
BILIRUB SERPL-MCNC: 2.2 MG/DL — HIGH (ref 0.2–1.2)
BILIRUB UR-MCNC: NEGATIVE — SIGNIFICANT CHANGE UP
BLD GP AB SCN SERPL QL: POSITIVE — SIGNIFICANT CHANGE UP
BLOOD UR QL VISUAL: NEGATIVE — SIGNIFICANT CHANGE UP
BUN SERPL-MCNC: 10 MG/DL — SIGNIFICANT CHANGE UP (ref 7–23)
CALCIUM SERPL-MCNC: 9.2 MG/DL — SIGNIFICANT CHANGE UP (ref 8.4–10.5)
CHLORIDE SERPL-SCNC: 103 MMOL/L — SIGNIFICANT CHANGE UP (ref 98–107)
CO2 SERPL-SCNC: 23 MMOL/L — SIGNIFICANT CHANGE UP (ref 22–31)
COLOR SPEC: YELLOW — SIGNIFICANT CHANGE UP
CREAT SERPL-MCNC: 0.75 MG/DL — SIGNIFICANT CHANGE UP (ref 0.5–1.3)
DAT C3-SP REAG RBC QL: NEGATIVE — SIGNIFICANT CHANGE UP
DAT POLY-SP REAG RBC QL: NEGATIVE — SIGNIFICANT CHANGE UP
DIRECT COOMBS IGG: NEGATIVE — SIGNIFICANT CHANGE UP
EOSINOPHIL # BLD AUTO: 0.36 K/UL — SIGNIFICANT CHANGE UP (ref 0–0.5)
EOSINOPHIL NFR BLD AUTO: 7.4 % — HIGH (ref 0–6)
GLUCOSE SERPL-MCNC: 97 MG/DL — SIGNIFICANT CHANGE UP (ref 70–99)
GLUCOSE UR-MCNC: NEGATIVE — SIGNIFICANT CHANGE UP
HCG UR-SCNC: NEGATIVE — SIGNIFICANT CHANGE UP
HCT VFR BLD CALC: 22.3 % — LOW (ref 34.5–45)
HGB BLD-MCNC: 7.6 G/DL — LOW (ref 11.5–15.5)
IMM GRANULOCYTES NFR BLD AUTO: 0.4 % — SIGNIFICANT CHANGE UP (ref 0–1.5)
INR BLD: 1.24 — HIGH (ref 0.88–1.17)
KETONES UR-MCNC: NEGATIVE — SIGNIFICANT CHANGE UP
LEUKOCYTE ESTERASE UR-ACNC: NEGATIVE — SIGNIFICANT CHANGE UP
LYMPHOCYTES # BLD AUTO: 2.09 K/UL — SIGNIFICANT CHANGE UP (ref 1–3.3)
LYMPHOCYTES # BLD AUTO: 42.8 % — SIGNIFICANT CHANGE UP (ref 13–44)
MCHC RBC-ENTMCNC: 27 PG — SIGNIFICANT CHANGE UP (ref 27–34)
MCHC RBC-ENTMCNC: 34.1 % — SIGNIFICANT CHANGE UP (ref 32–36)
MCV RBC AUTO: 79.4 FL — LOW (ref 80–100)
MONOCYTES # BLD AUTO: 0.44 K/UL — SIGNIFICANT CHANGE UP (ref 0–0.9)
MONOCYTES NFR BLD AUTO: 9 % — SIGNIFICANT CHANGE UP (ref 2–14)
NEUTROPHILS # BLD AUTO: 1.91 K/UL — SIGNIFICANT CHANGE UP (ref 1.8–7.4)
NEUTROPHILS NFR BLD AUTO: 39.2 % — LOW (ref 43–77)
NITRITE UR-MCNC: NEGATIVE — SIGNIFICANT CHANGE UP
NRBC # FLD: 0.13 K/UL — SIGNIFICANT CHANGE UP (ref 0–0)
NRBC FLD-RTO: 2.7 — SIGNIFICANT CHANGE UP
PH UR: 6 — SIGNIFICANT CHANGE UP (ref 5–8)
PLATELET # BLD AUTO: 224 K/UL — SIGNIFICANT CHANGE UP (ref 150–400)
PMV BLD: 9.1 FL — SIGNIFICANT CHANGE UP (ref 7–13)
POTASSIUM SERPL-MCNC: 3.9 MMOL/L — SIGNIFICANT CHANGE UP (ref 3.5–5.3)
POTASSIUM SERPL-SCNC: 3.9 MMOL/L — SIGNIFICANT CHANGE UP (ref 3.5–5.3)
PROT SERPL-MCNC: 8.3 G/DL — SIGNIFICANT CHANGE UP (ref 6–8.3)
PROT UR-MCNC: 10 — SIGNIFICANT CHANGE UP
PROTHROM AB SERPL-ACNC: 13.8 SEC — HIGH (ref 9.8–13.1)
RBC # BLD: 2.81 M/UL — LOW (ref 3.8–5.2)
RBC # FLD: 14.4 % — SIGNIFICANT CHANGE UP (ref 10.3–14.5)
RETICS #: 140 K/UL — HIGH (ref 25–125)
RETICS/RBC NFR: 5 % — HIGH (ref 0.5–2.5)
RH IG SCN BLD-IMP: POSITIVE — SIGNIFICANT CHANGE UP
SODIUM SERPL-SCNC: 138 MMOL/L — SIGNIFICANT CHANGE UP (ref 135–145)
SP GR SPEC: 1.01 — SIGNIFICANT CHANGE UP (ref 1–1.04)
TROPONIN T, HIGH SENSITIVITY: < 6 NG/L — SIGNIFICANT CHANGE UP (ref ?–14)
UROBILINOGEN FLD QL: SIGNIFICANT CHANGE UP
WBC # BLD: 4.88 K/UL — SIGNIFICANT CHANGE UP (ref 3.8–10.5)
WBC # FLD AUTO: 4.88 K/UL — SIGNIFICANT CHANGE UP (ref 3.8–10.5)

## 2020-01-17 PROCEDURE — 86077 PHYS BLOOD BANK SERV XMATCH: CPT

## 2020-01-17 PROCEDURE — 99223 1ST HOSP IP/OBS HIGH 75: CPT

## 2020-01-17 PROCEDURE — 71045 X-RAY EXAM CHEST 1 VIEW: CPT | Mod: 26

## 2020-01-17 RX ORDER — SODIUM CHLORIDE 9 MG/ML
1000 INJECTION, SOLUTION INTRAVENOUS ONCE
Refills: 0 | Status: COMPLETED | OUTPATIENT
Start: 2020-01-17 | End: 2020-01-17

## 2020-01-17 RX ORDER — FOLIC ACID 0.8 MG
1 TABLET ORAL DAILY
Refills: 0 | Status: DISCONTINUED | OUTPATIENT
Start: 2020-01-17 | End: 2020-01-20

## 2020-01-17 RX ORDER — MORPHINE SULFATE 50 MG/1
4 CAPSULE, EXTENDED RELEASE ORAL ONCE
Refills: 0 | Status: DISCONTINUED | OUTPATIENT
Start: 2020-01-17 | End: 2020-01-17

## 2020-01-17 RX ORDER — HYDROMORPHONE HYDROCHLORIDE 2 MG/ML
2 INJECTION INTRAMUSCULAR; INTRAVENOUS; SUBCUTANEOUS
Refills: 0 | Status: DISCONTINUED | OUTPATIENT
Start: 2020-01-17 | End: 2020-01-17

## 2020-01-17 RX ORDER — HYDROMORPHONE HYDROCHLORIDE 2 MG/ML
30 INJECTION INTRAMUSCULAR; INTRAVENOUS; SUBCUTANEOUS
Refills: 0 | Status: DISCONTINUED | OUTPATIENT
Start: 2020-01-17 | End: 2020-01-20

## 2020-01-17 RX ORDER — SODIUM CHLORIDE 9 MG/ML
1000 INJECTION, SOLUTION INTRAVENOUS
Refills: 0 | Status: DISCONTINUED | OUTPATIENT
Start: 2020-01-17 | End: 2020-01-18

## 2020-01-17 RX ORDER — METOCLOPRAMIDE HCL 10 MG
10 TABLET ORAL ONCE
Refills: 0 | Status: COMPLETED | OUTPATIENT
Start: 2020-01-17 | End: 2020-01-17

## 2020-01-17 RX ORDER — DIPHENHYDRAMINE HCL 50 MG
50 CAPSULE ORAL ONCE
Refills: 0 | Status: COMPLETED | OUTPATIENT
Start: 2020-01-17 | End: 2020-01-17

## 2020-01-17 RX ORDER — ENOXAPARIN SODIUM 100 MG/ML
40 INJECTION SUBCUTANEOUS DAILY
Refills: 0 | Status: DISCONTINUED | OUTPATIENT
Start: 2020-01-17 | End: 2020-01-20

## 2020-01-17 RX ORDER — ONDANSETRON 8 MG/1
4 TABLET, FILM COATED ORAL EVERY 6 HOURS
Refills: 0 | Status: DISCONTINUED | OUTPATIENT
Start: 2020-01-17 | End: 2020-01-20

## 2020-01-17 RX ORDER — CHOLECALCIFEROL (VITAMIN D3) 125 MCG
1000 CAPSULE ORAL DAILY
Refills: 0 | Status: DISCONTINUED | OUTPATIENT
Start: 2020-01-17 | End: 2020-01-20

## 2020-01-17 RX ORDER — NALOXONE HYDROCHLORIDE 4 MG/.1ML
0.1 SPRAY NASAL
Refills: 0 | Status: DISCONTINUED | OUTPATIENT
Start: 2020-01-17 | End: 2020-01-20

## 2020-01-17 RX ORDER — HYDROXYUREA 500 MG/1
500 CAPSULE ORAL THREE TIMES A DAY
Refills: 0 | Status: DISCONTINUED | OUTPATIENT
Start: 2020-01-17 | End: 2020-01-20

## 2020-01-17 RX ORDER — SENNA PLUS 8.6 MG/1
2 TABLET ORAL AT BEDTIME
Refills: 0 | Status: DISCONTINUED | OUTPATIENT
Start: 2020-01-17 | End: 2020-01-20

## 2020-01-17 RX ADMIN — MORPHINE SULFATE 4 MILLIGRAM(S): 50 CAPSULE, EXTENDED RELEASE ORAL at 18:17

## 2020-01-17 RX ADMIN — HYDROMORPHONE HYDROCHLORIDE 2 MILLIGRAM(S): 2 INJECTION INTRAMUSCULAR; INTRAVENOUS; SUBCUTANEOUS at 20:32

## 2020-01-17 RX ADMIN — MORPHINE SULFATE 4 MILLIGRAM(S): 50 CAPSULE, EXTENDED RELEASE ORAL at 17:23

## 2020-01-17 RX ADMIN — MORPHINE SULFATE 4 MILLIGRAM(S): 50 CAPSULE, EXTENDED RELEASE ORAL at 18:49

## 2020-01-17 RX ADMIN — HYDROXYUREA 500 MILLIGRAM(S): 500 CAPSULE ORAL at 22:57

## 2020-01-17 RX ADMIN — Medication 10 MILLIGRAM(S): at 18:57

## 2020-01-17 RX ADMIN — SENNA PLUS 2 TABLET(S): 8.6 TABLET ORAL at 22:57

## 2020-01-17 RX ADMIN — HYDROMORPHONE HYDROCHLORIDE 2 MILLIGRAM(S): 2 INJECTION INTRAMUSCULAR; INTRAVENOUS; SUBCUTANEOUS at 21:02

## 2020-01-17 RX ADMIN — HYDROMORPHONE HYDROCHLORIDE 30 MILLILITER(S): 2 INJECTION INTRAMUSCULAR; INTRAVENOUS; SUBCUTANEOUS at 22:54

## 2020-01-17 RX ADMIN — SODIUM CHLORIDE 125 MILLILITER(S): 9 INJECTION, SOLUTION INTRAVENOUS at 22:53

## 2020-01-17 RX ADMIN — SODIUM CHLORIDE 1000 MILLILITER(S): 9 INJECTION, SOLUTION INTRAVENOUS at 17:23

## 2020-01-17 RX ADMIN — MORPHINE SULFATE 4 MILLIGRAM(S): 50 CAPSULE, EXTENDED RELEASE ORAL at 18:00

## 2020-01-17 RX ADMIN — SODIUM CHLORIDE 125 MILLILITER(S): 9 INJECTION, SOLUTION INTRAVENOUS at 21:27

## 2020-01-17 RX ADMIN — Medication 50 MILLIGRAM(S): at 17:23

## 2020-01-17 NOTE — H&P ADULT - ASSESSMENT
43 year old female, with medical history of sickle cell anemia with crisis, ACS, Vitamin D deficiency a/w CP, lower back pain, b/l hips and knees pain due to VOC;

## 2020-01-17 NOTE — H&P ADULT - PROBLEM SELECTOR PLAN 4
Lovenox sq DVT ppx Total bili 2.2;   LFTs wnl; bili garcia elevation likely due to hemolysis / VOC, as during prior episodes;  -Monitor

## 2020-01-17 NOTE — ED ADULT NURSE NOTE - ED STAT RN HANDOFF DETAILS
report given to RICHELLE Moyer, Pt curently complains of pain 7/10 following pain medication as per EMR, but pt states "im comfortable, I just want to try and sleep." Pt currently in no obvious distress

## 2020-01-17 NOTE — H&P ADULT - PROBLEM SELECTOR PLAN 6
1.  Name of PCP: Dr. Estrella Salazar, hematologist, [phone]  2.  PCP Contacted on Admission: [ ] Y    [x] N   night admit   3.  PCP contacted at Discharge: [ ] Y    [ ] N    [ ] N/A  4.  Post-Discharge Appointment Date and Location:  5.  Summary of Handoff given to PCP: Pt on home Fentanyl patch (applied 1 day ago), 50mcg/36hrs;  -May need new patch within 24hrs

## 2020-01-17 NOTE — H&P ADULT - NSHPLABSRESULTS_GEN_ALL_CORE
CXR - clear lungs, no pleural effusions - my reading  EKG, 1/17, NSR, 68bpm, qtc 418, no acute Tw or ST changes - my reading

## 2020-01-17 NOTE — H&P ADULT - PROBLEM SELECTOR PLAN 5
Pt on home Fentanyl patch (applied 1 day ago), 50mcg/36hrs;  -May need new patch within 24hrs Lovenox sq DVT ppx

## 2020-01-17 NOTE — H&P ADULT - HISTORY OF PRESENT ILLNESS
43 year old female, with medical history of sickle cell anemia with crisis, ACS, Vitamin D deficiency, 43 year old female, with medical history of sickle cell anemia with crisis, ACS, Vitamin D deficiency; Pt c/o constant, 10/10, b/l knee, lower back and hips pain as well as midsternal, non-radiating chest pain for the past 3-4 days; The pt states that the pain is c/w prior VOC and improving only mildly with MS Contin taken at home. Pt states that she put Fentanyl patch 50mcg 1 day ago as the pain for increasing in intensity. Decided to come to ED as she felt that the pain was still not improving; Reports no cough, SOB, Abd pain, fever, rashes, sore throat or runny nose; Of note, reports "low" baseline BP, usually 100/50mm.     ED course: s/p 4mg of Morphine IVP x2 doses, soft BP= 119/73 --> 96/53;

## 2020-01-17 NOTE — ED PROVIDER NOTE - CLINICAL SUMMARY MEDICAL DECISION MAKING FREE TEXT BOX
likely sickle cell crisis, given h/o ACS will check cxr, vitals wnl, will give pain meds and reassess likely sickle cell crisis, given h/o acute chest; will check cxr, vitals wnl, will give pain meds and reassess

## 2020-01-17 NOTE — H&P ADULT - NSHPSOCIALHISTORY_GEN_ALL_CORE
Licensed [employer] and [employer]  Lives with children  Former smoker, 1ppd x 7 years  No history of alcohol abuse or illicit drug

## 2020-01-17 NOTE — ED ADULT TRIAGE NOTE - CHIEF COMPLAINT QUOTE
c/o intermittent chest pain, back , head, bilateral leg pain x 1 month but increasingly worse over past 3 wks. Hx Sickle cell anemia , Acute chest syndrome. Takes Morphine sulfate, MS Contin and Duragesic patch which are not helping the pain.

## 2020-01-17 NOTE — H&P ADULT - PROBLEM SELECTOR PLAN 1
1/2 NS  CXR - clear lungs  Dilaudid Symptoms c/w prior VOC episodes;  1/2 NS  CXR - clear lungs  Dilaudid IVP, transition to PCA pump  EKG: QTc WNL Symptoms c/w prior VOC episodes;  1/2 NS  CXR - clear lungs, my reading  Dilaudid IVP, transition to PCA pump  EKG: QTc WNL

## 2020-01-17 NOTE — H&P ADULT - PROBLEM SELECTOR PLAN 2
Resolved; Likely due to VOC; Low suspicion of ischemic ACS;   EKG: WNL  hsTrop <6  No need for Telemetry Resolved; Likely due to VOC; Low suspicion of ischemic ACS;   CXR - clear lungs - my reading  f/u official CXR report   EKG: WNL  hsTrop <6  No need for Telemetry

## 2020-01-17 NOTE — ED PROVIDER NOTE - PHYSICAL EXAMINATION
*GEN:   uncomfortable, AOx3  *EYES:   pupils equally round and reactive to light, extra-occular movements intact  *HEENT:   airway patent, moist mucosal membranes, full ROM neck  *CV:   regular rate and rhythm  *RESP:   clear to auscultation bilaterally, non-labored  *ABD:   soft, non-tender  *:   no cva/flank tenderness  *EXTREM:   mild generalized MSK tenderness, full ROM throughout, no leg swelling  *SKIN:   dry, intact  *NEURO:   AOx3, no focal weakness or loss of sensation

## 2020-01-17 NOTE — ED ADULT NURSE NOTE - OBJECTIVE STATEMENT
Pt received in spot 2, A&OX4, NAD.  c/o SCC with pain to back, B/L knees and legs for the past month.  Pt states was trying to manage pain at home with PO morphine and oxycontin, but not having any relief.  Pt denies any SOB/palpitations/chest pain presently.  Pt also c/o increased vaginal bleeding, periods are more heavy, but states thinks her birth control needs to be changed.  Denies any other bleeding.  Endorses hx of blood transfusion.  Pt appears uncomfortable.  Labs sent.  Will continue to monitor.

## 2020-01-17 NOTE — ED PROVIDER NOTE - OBJECTIVE STATEMENT
44F w/ SC - here for 4 wks 44F w/ SC - here for 4 wks diffuse pain across head, neck, chest, abdomen, bilat extremities - been taking morphine, MS contin w/ minimal relief, here because couldn't further tolerate the pain. No fever, n/v/d/c, cough, sob, dizziness, dysuria/hematuria.

## 2020-01-17 NOTE — H&P ADULT - PROBLEM SELECTOR PLAN 7
1.  Name of PCP: Dr. Estrella Salazar, hematologist, [phone]  2.  PCP Contacted on Admission: [ ] Y    [x] N   night admit   3.  PCP contacted at Discharge: [ ] Y    [ ] N    [ ] N/A  4.  Post-Discharge Appointment Date and Location:  5.  Summary of Handoff given to PCP:

## 2020-01-17 NOTE — ED PROVIDER NOTE - ATTENDING CONTRIBUTION TO CARE
DR. ALFARO, ATTENDING MD-  I performed a face to face bedside interview with the patient regarding history of present illness, review of symptoms and past medical history. I completed an independent physical exam.  I have discussed the patient's plan of care with the resident.   Documentation as above in the note.    43 y/o female h/o sickle cell, acute chest, here with diffuse pain x1 month, worsening despite home pain meds.  States no fever or cough.  Feels like sickle cell pain crises in past.  Obtain cbc, bmp, retic, cxr, ekg, give ivf, pain med, reassess.

## 2020-01-17 NOTE — H&P ADULT - LYMPHATIC
supraclavicular R/anterior cervical R/supraclavicular L/anterior cervical L/posterior cervical L/posterior cervical R

## 2020-01-18 LAB
ALBUMIN SERPL ELPH-MCNC: 3.7 G/DL — SIGNIFICANT CHANGE UP (ref 3.3–5)
ALP SERPL-CCNC: 59 U/L — SIGNIFICANT CHANGE UP (ref 40–120)
ALT FLD-CCNC: 9 U/L — SIGNIFICANT CHANGE UP (ref 4–33)
ANION GAP SERPL CALC-SCNC: 9 MMO/L — SIGNIFICANT CHANGE UP (ref 7–14)
AST SERPL-CCNC: 42 U/L — HIGH (ref 4–32)
BASOPHILS # BLD AUTO: 0.05 K/UL — SIGNIFICANT CHANGE UP (ref 0–0.2)
BASOPHILS NFR BLD AUTO: 1.1 % — SIGNIFICANT CHANGE UP (ref 0–2)
BILIRUB SERPL-MCNC: 1.7 MG/DL — HIGH (ref 0.2–1.2)
BLD GP AB SCN SERPL QL: POSITIVE — SIGNIFICANT CHANGE UP
BUN SERPL-MCNC: 8 MG/DL — SIGNIFICANT CHANGE UP (ref 7–23)
CALCIUM SERPL-MCNC: 8.3 MG/DL — LOW (ref 8.4–10.5)
CHLORIDE SERPL-SCNC: 106 MMOL/L — SIGNIFICANT CHANGE UP (ref 98–107)
CO2 SERPL-SCNC: 21 MMOL/L — LOW (ref 22–31)
CREAT SERPL-MCNC: 0.7 MG/DL — SIGNIFICANT CHANGE UP (ref 0.5–1.3)
DAT C3-SP REAG RBC QL: NEGATIVE — SIGNIFICANT CHANGE UP
DAT POLY-SP REAG RBC QL: NEGATIVE — SIGNIFICANT CHANGE UP
DIRECT COOMBS IGG: NEGATIVE — SIGNIFICANT CHANGE UP
EOSINOPHIL # BLD AUTO: 0.37 K/UL — SIGNIFICANT CHANGE UP (ref 0–0.5)
EOSINOPHIL NFR BLD AUTO: 7.8 % — HIGH (ref 0–6)
GLUCOSE SERPL-MCNC: 88 MG/DL — SIGNIFICANT CHANGE UP (ref 70–99)
HCT VFR BLD CALC: 20.3 % — CRITICAL LOW (ref 34.5–45)
HGB BLD-MCNC: 6.9 G/DL — CRITICAL LOW (ref 11.5–15.5)
IMM GRANULOCYTES NFR BLD AUTO: 0.2 % — SIGNIFICANT CHANGE UP (ref 0–1.5)
LDH SERPL L TO P-CCNC: 308 U/L — HIGH (ref 135–225)
LYMPHOCYTES # BLD AUTO: 2.82 K/UL — SIGNIFICANT CHANGE UP (ref 1–3.3)
LYMPHOCYTES # BLD AUTO: 59.6 % — HIGH (ref 13–44)
MCHC RBC-ENTMCNC: 27.8 PG — SIGNIFICANT CHANGE UP (ref 27–34)
MCHC RBC-ENTMCNC: 34 % — SIGNIFICANT CHANGE UP (ref 32–36)
MCV RBC AUTO: 81.9 FL — SIGNIFICANT CHANGE UP (ref 80–100)
MONOCYTES # BLD AUTO: 0.46 K/UL — SIGNIFICANT CHANGE UP (ref 0–0.9)
MONOCYTES NFR BLD AUTO: 9.7 % — SIGNIFICANT CHANGE UP (ref 2–14)
NEUTROPHILS # BLD AUTO: 1.02 K/UL — LOW (ref 1.8–7.4)
NEUTROPHILS NFR BLD AUTO: 21.6 % — LOW (ref 43–77)
NRBC # FLD: 0.1 K/UL — SIGNIFICANT CHANGE UP (ref 0–0)
NRBC FLD-RTO: 2.1 — SIGNIFICANT CHANGE UP
PLATELET # BLD AUTO: 197 K/UL — SIGNIFICANT CHANGE UP (ref 150–400)
PMV BLD: 9.6 FL — SIGNIFICANT CHANGE UP (ref 7–13)
POTASSIUM SERPL-MCNC: 3.7 MMOL/L — SIGNIFICANT CHANGE UP (ref 3.5–5.3)
POTASSIUM SERPL-SCNC: 3.7 MMOL/L — SIGNIFICANT CHANGE UP (ref 3.5–5.3)
PROT SERPL-MCNC: 6.7 G/DL — SIGNIFICANT CHANGE UP (ref 6–8.3)
RBC # BLD: 2.48 M/UL — LOW (ref 3.8–5.2)
RBC # FLD: 14.3 % — SIGNIFICANT CHANGE UP (ref 10.3–14.5)
RETICS #: 121 K/UL — SIGNIFICANT CHANGE UP (ref 25–125)
RETICS/RBC NFR: 4.9 % — HIGH (ref 0.5–2.5)
RH IG SCN BLD-IMP: POSITIVE — SIGNIFICANT CHANGE UP
SODIUM SERPL-SCNC: 136 MMOL/L — SIGNIFICANT CHANGE UP (ref 135–145)
WBC # BLD: 4.73 K/UL — SIGNIFICANT CHANGE UP (ref 3.8–10.5)
WBC # FLD AUTO: 4.73 K/UL — SIGNIFICANT CHANGE UP (ref 3.8–10.5)

## 2020-01-18 PROCEDURE — 99233 SBSQ HOSP IP/OBS HIGH 50: CPT

## 2020-01-18 RX ORDER — MORPHINE SULFATE 50 MG/1
60 CAPSULE, EXTENDED RELEASE ORAL EVERY 12 HOURS
Refills: 0 | Status: DISCONTINUED | OUTPATIENT
Start: 2020-01-18 | End: 2020-01-20

## 2020-01-18 RX ORDER — INFLUENZA VIRUS VACCINE 15; 15; 15; 15 UG/.5ML; UG/.5ML; UG/.5ML; UG/.5ML
0.5 SUSPENSION INTRAMUSCULAR ONCE
Refills: 0 | Status: DISCONTINUED | OUTPATIENT
Start: 2020-01-18 | End: 2020-01-20

## 2020-01-18 RX ORDER — SODIUM CHLORIDE 9 MG/ML
1000 INJECTION, SOLUTION INTRAVENOUS
Refills: 0 | Status: COMPLETED | OUTPATIENT
Start: 2020-01-18 | End: 2020-01-19

## 2020-01-18 RX ORDER — FENTANYL CITRATE 50 UG/ML
1 INJECTION INTRAVENOUS
Refills: 0 | Status: DISCONTINUED | OUTPATIENT
Start: 2020-01-18 | End: 2020-01-20

## 2020-01-18 RX ADMIN — SODIUM CHLORIDE 125 MILLILITER(S): 9 INJECTION, SOLUTION INTRAVENOUS at 06:21

## 2020-01-18 RX ADMIN — Medication 1 TABLET(S): at 17:57

## 2020-01-18 RX ADMIN — SODIUM CHLORIDE 125 MILLILITER(S): 9 INJECTION, SOLUTION INTRAVENOUS at 19:19

## 2020-01-18 RX ADMIN — FENTANYL CITRATE 1 PATCH: 50 INJECTION INTRAVENOUS at 19:54

## 2020-01-18 RX ADMIN — Medication 1000 UNIT(S): at 11:49

## 2020-01-18 RX ADMIN — HYDROMORPHONE HYDROCHLORIDE 30 MILLILITER(S): 2 INJECTION INTRAMUSCULAR; INTRAVENOUS; SUBCUTANEOUS at 19:20

## 2020-01-18 RX ADMIN — HYDROXYUREA 500 MILLIGRAM(S): 500 CAPSULE ORAL at 06:21

## 2020-01-18 RX ADMIN — HYDROXYUREA 500 MILLIGRAM(S): 500 CAPSULE ORAL at 14:42

## 2020-01-18 RX ADMIN — HYDROMORPHONE HYDROCHLORIDE 30 MILLILITER(S): 2 INJECTION INTRAMUSCULAR; INTRAVENOUS; SUBCUTANEOUS at 07:25

## 2020-01-18 RX ADMIN — ENOXAPARIN SODIUM 40 MILLIGRAM(S): 100 INJECTION SUBCUTANEOUS at 11:48

## 2020-01-18 RX ADMIN — HYDROXYUREA 500 MILLIGRAM(S): 500 CAPSULE ORAL at 22:08

## 2020-01-18 RX ADMIN — Medication 1 MILLIGRAM(S): at 11:49

## 2020-01-18 RX ADMIN — FENTANYL CITRATE 1 PATCH: 50 INJECTION INTRAVENOUS at 11:48

## 2020-01-18 RX ADMIN — MORPHINE SULFATE 60 MILLIGRAM(S): 50 CAPSULE, EXTENDED RELEASE ORAL at 17:57

## 2020-01-18 NOTE — PROGRESS NOTE ADULT - PROBLEM SELECTOR PLAN 3
c/w prior VOC episodes;  IV hydration  Pain control   Consider radiologic studies if not improving Lovenox sq DVT ppx

## 2020-01-18 NOTE — PATIENT PROFILE ADULT - NSTOBACCOWITHDRW_GEN_A_CORE_SD
[facility] Hospital Medicine  Consult Note    Patient Name: Judy Keyes  MRN: [id number]  Admission Date: 4/18/2018  Hospital Length of Stay: 1 days  Attending Physician: Jarret Lima MD   Primary Care Provider: Primary Doctor No           Patient information was obtained from patient and ER records.     Inpatient consult to [facility] for History and Physical  Consult performed by: MAXIMO ROWE  Consult ordered by: JARRET LIMA        Subjective:     Principal Problem: <principal problem not specified>    Chief Complaint: No chief complaint on file.       HPI: Pt was admitted to [facility] with c/o SI. Medicine consulted for H/P    Past Medical History:   Diagnosis Date    Anxiety     Depression     History of psychiatric hospitalization     Hx of psychiatric care     Psychiatric problem     Therapy        No past surgical history on file.    Review of patient's allergies indicates:  No Known Allergies    No current facility-administered medications on file prior to encounter.      No current outpatient prescriptions on file prior to encounter.     Family History     None        Social History Main Topics    Smoking status: Current Some Day Smoker     Packs/day: 0.25     Years: 2.00     Types: Cigarettes    Smokeless tobacco: Never Used    Alcohol use No    Drug use: Yes     Types: Marijuana    Sexual activity: Not Currently     Partners: Male     Birth control/ protection: None     Review of Systems   Constitutional: Negative for chills, fatigue, fever and unexpected weight change.   HENT: Negative for congestion, ear pain, sore throat and trouble swallowing.    Eyes: Negative for pain and visual disturbance.   Respiratory: Negative for cough, chest tightness and shortness of breath.    Cardiovascular: Negative for chest pain, palpitations and leg swelling.   Gastrointestinal: Negative for abdominal distention, abdominal pain, constipation, diarrhea and  vomiting.   Genitourinary: Negative for difficulty urinating, dysuria, flank pain, frequency and hematuria.   Musculoskeletal: Negative for back pain, gait problem, joint swelling, neck pain and neck stiffness.   Skin: Negative for rash and wound.   Neurological: Negative for dizziness, seizures, speech difficulty, light-headedness and headaches.     Objective:     Vital Signs (Most Recent):  Temp: 98.1 °F (36.7 °C) (04/19/18 0915)  Pulse: 80 (04/19/18 0915)  Resp: 18 (04/19/18 0915)  BP: 124/63 (04/19/18 0915) Vital Signs (24h Range):  Temp:  [98.1 °F (36.7 °C)-98.7 °F (37.1 °C)] 98.1 °F (36.7 °C)  Pulse:  [65-80] 80  Resp:  [18-20] 18  BP: ()/(63-67) 124/63     Weight: 46.4 kg (102 lb 5 oz)  Body mass index is 18.12 kg/m².    Physical Exam   Constitutional: She is oriented to person, place, and time. She appears well-developed and well-nourished.   HENT:   Head: Normocephalic and atraumatic.   Neck: No thyromegaly present.   Cardiovascular: Normal rate, regular rhythm, normal heart sounds and intact distal pulses.    No murmur heard.  Pulmonary/Chest: Effort normal and breath sounds normal. No respiratory distress. She has no wheezes. She has no rales.   Abdominal: Soft. Bowel sounds are normal. She exhibits no distension and no mass. There is no tenderness.   Musculoskeletal: Normal range of motion. She exhibits no edema.   Lymphadenopathy:     She has no cervical adenopathy.   Neurological: She is alert and oriented to person, place, and time.     Neuro: Cranial nerves:  CN II Visual fields full to confrontation.   CN III, IV, VI Pupils are equal, round, and reactive to light.  CN III: no palsy  Nystagmus: none   Diplopia: none  Ophthalmoparesis: none  CN V Facial sensation intact.   CN VII Facial expression full, symmetric.   CN VIII normal.   CN IX normal.   CN X normal.   CN XI normal.   CN XII normal.         Skin: Skin is warm and dry. No erythema.   Vitals reviewed.      Significant Labs:     UDS  +THC  Wbc 14, h/h 14/41, plt 177  Na 136, k 3.9, bun/creat 9/0.6, glucose 107  HIV -  ETOH <10  UPT -            Assessment/Plan:     Depression with suicidal ideation    Further orders per psych            VTE Risk Mitigation     None              Thank you for your consult. I will sign off. Please contact us if you have any additional questions.    Violeta Kolb NP  Department of Hospital Medicine   [facility]     none

## 2020-01-18 NOTE — PROGRESS NOTE ADULT - SUBJECTIVE AND OBJECTIVE BOX
Patient is a 44y old  Female who presents with a chief complaint of Chest pain (2020 19:43)        SUBJECTIVE / OVERNIGHT EVENTS:      MEDICATIONS  (STANDING):  cholecalciferol 1000 Unit(s) Oral daily  enoxaparin Injectable 40 milliGRAM(s) SubCutaneous daily  folic acid 1 milliGRAM(s) Oral daily  HYDROmorphone PCA (1 mG/mL) 30 milliLiter(s) PCA Continuous PCA Continuous  hydroxyurea (Non - oncologic) 500 milliGRAM(s) Oral three times a day  influenza   Vaccine 0.5 milliLiter(s) IntraMuscular once  multivitamin  Chewable 1 Tablet(s) Oral daily  senna 2 Tablet(s) Oral at bedtime  sodium chloride 0.45%. 1000 milliLiter(s) (125 mL/Hr) IV Continuous <Continuous>    MEDICATIONS  (PRN):  naloxone Injectable 0.1 milliGRAM(s) IV Push every 3 minutes PRN For ANY of the following changes in patient status:  A. RR LESS THAN 10 breaths per minute, B. Oxygen saturation LESS THAN 90%, C. Sedation score of 6  ondansetron Injectable 4 milliGRAM(s) IV Push every 6 hours PRN Nausea      Vital Signs Last 24 Hrs  T(C): 36.9 (2020 06:30), Max: 36.9 (2020 06:30)  T(F): 98.5 (2020 06:30), Max: 98.5 (2020 06:30)  HR: 75 (2020 06:30) (71 - 81)  BP: 100/60 (2020 06:30) (96/53 - 119/73)  BP(mean): --  RR: 16 (2020 06:30) (15 - 18)  SpO2: 97% (2020 06:30) (97% - 100%)  CAPILLARY BLOOD GLUCOSE        I&O's Summary        PHYSICAL EXAM  GENERAL: NAD, well-developed  HEAD:  Atraumatic, Normocephalic  EYES: EOMI, PERRLA, conjunctiva and sclera clear  NECK: Supple, No JVD  CHEST/LUNG: Clear to auscultation bilaterally; No wheeze  HEART: Regular rate and rhythm; No murmurs, rubs, or gallops  ABDOMEN: Soft, Nontender, Nondistended; Bowel sounds present  EXTREMITIES:  2+ Peripheral Pulses, No clubbing, cyanosis, or edema  PSYCH: AAOx3  SKIN: No rashes or lesions    LABS:                        6.9    4.73  )-----------( 197      ( 2020 06:30 )             20.3         136  |  106  |  8   ----------------------------<  88  3.7   |  21<L>  |  0.70    Ca    8.3<L>      2020 06:30    TPro  6.7  /  Alb  3.7  /  TBili  1.7<H>  /  DBili  x   /  AST  42<H>  /  ALT  9   /  AlkPhos  59  18    PT/INR - ( 2020 18:00 )   PT: 13.8 SEC;   INR: 1.24          PTT - ( 2020 18:00 )  PTT:33.0 SEC      Urinalysis Basic - ( 2020 18:19 )    Color: YELLOW / Appearance: CLEAR / S.014 / pH: 6.0  Gluc: NEGATIVE / Ketone: NEGATIVE  / Bili: NEGATIVE / Urobili: TRACE   Blood: NEGATIVE / Protein: 10 / Nitrite: NEGATIVE   Leuk Esterase: NEGATIVE / RBC: x / WBC x   Sq Epi: x / Non Sq Epi: x / Bacteria: x        RADIOLOGY & ADDITIONAL TESTS:    Imaging Personally Reviewed:  Consultant(s) Notes Reviewed:    Care Discussed with Consultants/Other Providers: Patient is a 44y old  Female who presents with a chief complaint of Chest pain (17 Jan 2020 19:43)    SUBJECTIVE / OVERNIGHT EVENTS:  Patient's pain controlled on Dilaudid PCA pump. She denies chest pain, SOB, n/v or abdominal pain. Pt with normal urinary and bowel habits.    MEDICATIONS  (STANDING):  cholecalciferol 1000 Unit(s) Oral daily  enoxaparin Injectable 40 milliGRAM(s) SubCutaneous daily  folic acid 1 milliGRAM(s) Oral daily  HYDROmorphone PCA (1 mG/mL) 30 milliLiter(s) PCA Continuous PCA Continuous  hydroxyurea (Non - oncologic) 500 milliGRAM(s) Oral three times a day  influenza   Vaccine 0.5 milliLiter(s) IntraMuscular once  multivitamin  Chewable 1 Tablet(s) Oral daily  senna 2 Tablet(s) Oral at bedtime  sodium chloride 0.45%. 1000 milliLiter(s) (125 mL/Hr) IV Continuous <Continuous>    MEDICATIONS  (PRN):  naloxone Injectable 0.1 milliGRAM(s) IV Push every 3 minutes PRN For ANY of the following changes in patient status:  A. RR LESS THAN 10 breaths per minute, B. Oxygen saturation LESS THAN 90%, C. Sedation score of 6  ondansetron Injectable 4 milliGRAM(s) IV Push every 6 hours PRN Nausea      Vital Signs Last 24 Hrs  T(C): 36.9 (18 Jan 2020 06:30), Max: 36.9 (18 Jan 2020 06:30)  T(F): 98.5 (18 Jan 2020 06:30), Max: 98.5 (18 Jan 2020 06:30)  HR: 75 (18 Jan 2020 06:30) (71 - 81)  BP: 100/60 (18 Jan 2020 06:30) (96/53 - 119/73)  RR: 16 (18 Jan 2020 06:30) (15 - 18)  SpO2: 97% (18 Jan 2020 06:30) (97% - 100%)          PHYSICAL EXAM  GENERAL: NAD, well-developed  CHEST/LUNG: Clear to auscultation bilaterally; No wheeze  HEART: Regular rate and rhythm; +systolic murmur  ABDOMEN: Soft, Nontender, Nondistended; Bowel sounds present  EXTREMITIES:  2+ Peripheral Pulses, No clubbing, cyanosis, or edema  PSYCH: AAOx3      LABS:                        6.9    4.73  )-----------( 197      ( 18 Jan 2020 06:30 )             20.3     01-18    136  |  106  |  8   ----------------------------<  88  3.7   |  21<L>  |  0.70    Ca    8.3<L>      18 Jan 2020 06:30    TPro  6.7  /  Alb  3.7  /  TBili  1.7<H>  /  DBili  x   /  AST  42<H>  /  ALT  9   /  AlkPhos  59  01-18

## 2020-01-18 NOTE — PROGRESS NOTE ADULT - ASSESSMENT
43 year old female, with medical history of sickle cell anemia with crisis, ACS, Vitamin D deficiency a/w CP, lower back pain, b/l hips and knees pain due to VOC; Ms. Moody is a 43 year old woman with medical history of sickle cell anemia c/b prior ACS, Vitamin D deficiency presenting with CP, lower back pain, b/l hips and knees pain due to VOC

## 2020-01-18 NOTE — PROGRESS NOTE ADULT - PROBLEM SELECTOR PLAN 7
1.  Name of PCP: Dr. Estrella Salazar, hematologist, [phone]  2.  PCP Contacted on Admission: [ ] Y    [x] N   night admit   3.  PCP contacted at Discharge: [ ] Y    [ ] N    [ ] N/A  4.  Post-Discharge Appointment Date and Location:  5.  Summary of Handoff given to PCP:

## 2020-01-18 NOTE — PROGRESS NOTE ADULT - PROBLEM SELECTOR PLAN 4
Total bili 2.2;   LFTs wnl; bili garcia elevation likely due to hemolysis / VOC, as during prior episodes;  -Monitor 1.  Name of PCP: Dr. Estrella Salazar, hematologist, [phone]  2.  PCP Contacted on Admission: [ ] Y    [x] N   night admit   3.  PCP contacted at Discharge: [ ] Y    [ ] N    [ ] N/A  4.  Post-Discharge Appointment Date and Location:  5.  Summary of Handoff given to PCP:

## 2020-01-18 NOTE — PROGRESS NOTE ADULT - PROBLEM SELECTOR PLAN 1
Symptoms c/w prior VOC episodes;  1/2 NS  CXR - clear lungs, my reading  Dilaudid IVP, transition to PCA pump  EKG: QTc WNL Pain improving. No signs/symptoms suggestive of ACS at this time  - c/w 1/2 NS  - c/w Dilaudid PCA 0.4mg q6 min with 4hr max 16mg   - continue home regimen of fentanyl patch 50mcg q72h and morphine 60mg q12h  - HOLD home regimen of morphine 30mg IR PRN while on PCA pump  - daily bowel regimen --> senna 2 tabs at bedtime, miralax daily  - continue folic acid 1mg daily and hydroxyurea 500mg TID

## 2020-01-18 NOTE — PROGRESS NOTE ADULT - PROBLEM SELECTOR PLAN 2
Resolved; Likely due to VOC; Low suspicion of ischemic ACS;   CXR - clear lungs - my reading  f/u official CXR report   EKG: WNL  hsTrop <6  No need for Telemetry ISTOP performed on 1/18/20 ref# [id number]

## 2020-01-19 ENCOUNTER — TRANSCRIPTION ENCOUNTER (OUTPATIENT)
Age: 45
End: 2020-01-19

## 2020-01-19 LAB
ALBUMIN SERPL ELPH-MCNC: 3.2 G/DL — LOW (ref 3.3–5)
ALP SERPL-CCNC: 49 U/L — SIGNIFICANT CHANGE UP (ref 40–120)
ALT FLD-CCNC: 12 U/L — SIGNIFICANT CHANGE UP (ref 4–33)
ANION GAP SERPL CALC-SCNC: 10 MMO/L — SIGNIFICANT CHANGE UP (ref 7–14)
AST SERPL-CCNC: 48 U/L — HIGH (ref 4–32)
BASOPHILS # BLD AUTO: 0.05 K/UL — SIGNIFICANT CHANGE UP (ref 0–0.2)
BASOPHILS NFR BLD AUTO: 1.2 % — SIGNIFICANT CHANGE UP (ref 0–2)
BILIRUB SERPL-MCNC: 1.4 MG/DL — HIGH (ref 0.2–1.2)
BUN SERPL-MCNC: 5 MG/DL — LOW (ref 7–23)
CALCIUM SERPL-MCNC: 7.9 MG/DL — LOW (ref 8.4–10.5)
CHLORIDE SERPL-SCNC: 109 MMOL/L — HIGH (ref 98–107)
CO2 SERPL-SCNC: 17 MMOL/L — LOW (ref 22–31)
CREAT SERPL-MCNC: 0.6 MG/DL — SIGNIFICANT CHANGE UP (ref 0.5–1.3)
EOSINOPHIL # BLD AUTO: 0.33 K/UL — SIGNIFICANT CHANGE UP (ref 0–0.5)
EOSINOPHIL NFR BLD AUTO: 7.7 % — HIGH (ref 0–6)
GLUCOSE SERPL-MCNC: 84 MG/DL — SIGNIFICANT CHANGE UP (ref 70–99)
HAPTOGLOB SERPL-MCNC: < 20 MG/DL — LOW (ref 34–200)
HCT VFR BLD CALC: 20.4 % — CRITICAL LOW (ref 34.5–45)
HGB BLD-MCNC: 6.7 G/DL — CRITICAL LOW (ref 11.5–15.5)
IMM GRANULOCYTES NFR BLD AUTO: 0.2 % — SIGNIFICANT CHANGE UP (ref 0–1.5)
LDH SERPL L TO P-CCNC: 347 U/L — HIGH (ref 135–225)
LYMPHOCYTES # BLD AUTO: 2.55 K/UL — SIGNIFICANT CHANGE UP (ref 1–3.3)
LYMPHOCYTES # BLD AUTO: 59.2 % — HIGH (ref 13–44)
MAGNESIUM SERPL-MCNC: 1.6 MG/DL — SIGNIFICANT CHANGE UP (ref 1.6–2.6)
MCHC RBC-ENTMCNC: 26.9 PG — LOW (ref 27–34)
MCHC RBC-ENTMCNC: 32.8 % — SIGNIFICANT CHANGE UP (ref 32–36)
MCV RBC AUTO: 81.9 FL — SIGNIFICANT CHANGE UP (ref 80–100)
MONOCYTES # BLD AUTO: 0.46 K/UL — SIGNIFICANT CHANGE UP (ref 0–0.9)
MONOCYTES NFR BLD AUTO: 10.7 % — SIGNIFICANT CHANGE UP (ref 2–14)
NEUTROPHILS # BLD AUTO: 0.91 K/UL — LOW (ref 1.8–7.4)
NEUTROPHILS NFR BLD AUTO: 21 % — LOW (ref 43–77)
NRBC # FLD: 0.05 K/UL — SIGNIFICANT CHANGE UP (ref 0–0)
NRBC FLD-RTO: 1.2 — SIGNIFICANT CHANGE UP
PHOSPHATE SERPL-MCNC: 2.7 MG/DL — SIGNIFICANT CHANGE UP (ref 2.5–4.5)
PLATELET # BLD AUTO: 204 K/UL — SIGNIFICANT CHANGE UP (ref 150–400)
PMV BLD: 9.6 FL — SIGNIFICANT CHANGE UP (ref 7–13)
POTASSIUM SERPL-MCNC: 4.2 MMOL/L — SIGNIFICANT CHANGE UP (ref 3.5–5.3)
POTASSIUM SERPL-SCNC: 4.2 MMOL/L — SIGNIFICANT CHANGE UP (ref 3.5–5.3)
PROT SERPL-MCNC: 6.4 G/DL — SIGNIFICANT CHANGE UP (ref 6–8.3)
RBC # BLD: 2.49 M/UL — LOW (ref 3.8–5.2)
RBC # FLD: 14.1 % — SIGNIFICANT CHANGE UP (ref 10.3–14.5)
RETICS #: 116 K/UL — SIGNIFICANT CHANGE UP (ref 25–125)
RETICS/RBC NFR: 4.7 % — HIGH (ref 0.5–2.5)
SODIUM SERPL-SCNC: 136 MMOL/L — SIGNIFICANT CHANGE UP (ref 135–145)
WBC # BLD: 4.31 K/UL — SIGNIFICANT CHANGE UP (ref 3.8–10.5)
WBC # FLD AUTO: 4.31 K/UL — SIGNIFICANT CHANGE UP (ref 3.8–10.5)

## 2020-01-19 PROCEDURE — 99233 SBSQ HOSP IP/OBS HIGH 50: CPT

## 2020-01-19 RX ORDER — SODIUM CHLORIDE 9 MG/ML
1000 INJECTION, SOLUTION INTRAVENOUS
Refills: 0 | Status: DISCONTINUED | OUTPATIENT
Start: 2020-01-19 | End: 2020-01-20

## 2020-01-19 RX ORDER — ACETAMINOPHEN 500 MG
650 TABLET ORAL EVERY 6 HOURS
Refills: 0 | Status: DISCONTINUED | OUTPATIENT
Start: 2020-01-19 | End: 2020-01-20

## 2020-01-19 RX ADMIN — Medication 650 MILLIGRAM(S): at 19:00

## 2020-01-19 RX ADMIN — HYDROXYUREA 500 MILLIGRAM(S): 500 CAPSULE ORAL at 22:07

## 2020-01-19 RX ADMIN — SODIUM CHLORIDE 125 MILLILITER(S): 9 INJECTION, SOLUTION INTRAVENOUS at 06:36

## 2020-01-19 RX ADMIN — Medication 1000 UNIT(S): at 12:31

## 2020-01-19 RX ADMIN — MORPHINE SULFATE 60 MILLIGRAM(S): 50 CAPSULE, EXTENDED RELEASE ORAL at 18:33

## 2020-01-19 RX ADMIN — HYDROMORPHONE HYDROCHLORIDE 30 MILLILITER(S): 2 INJECTION INTRAMUSCULAR; INTRAVENOUS; SUBCUTANEOUS at 09:09

## 2020-01-19 RX ADMIN — HYDROXYUREA 500 MILLIGRAM(S): 500 CAPSULE ORAL at 14:39

## 2020-01-19 RX ADMIN — SODIUM CHLORIDE 125 MILLILITER(S): 9 INJECTION, SOLUTION INTRAVENOUS at 22:02

## 2020-01-19 RX ADMIN — HYDROMORPHONE HYDROCHLORIDE 30 MILLILITER(S): 2 INJECTION INTRAMUSCULAR; INTRAVENOUS; SUBCUTANEOUS at 07:25

## 2020-01-19 RX ADMIN — ENOXAPARIN SODIUM 40 MILLIGRAM(S): 100 INJECTION SUBCUTANEOUS at 12:31

## 2020-01-19 RX ADMIN — Medication 650 MILLIGRAM(S): at 18:34

## 2020-01-19 RX ADMIN — MORPHINE SULFATE 60 MILLIGRAM(S): 50 CAPSULE, EXTENDED RELEASE ORAL at 08:15

## 2020-01-19 RX ADMIN — FENTANYL CITRATE 1 PATCH: 50 INJECTION INTRAVENOUS at 09:12

## 2020-01-19 RX ADMIN — HYDROMORPHONE HYDROCHLORIDE 30 MILLILITER(S): 2 INJECTION INTRAMUSCULAR; INTRAVENOUS; SUBCUTANEOUS at 19:07

## 2020-01-19 RX ADMIN — FENTANYL CITRATE 1 PATCH: 50 INJECTION INTRAVENOUS at 19:41

## 2020-01-19 RX ADMIN — Medication 1 TABLET(S): at 12:27

## 2020-01-19 RX ADMIN — MORPHINE SULFATE 60 MILLIGRAM(S): 50 CAPSULE, EXTENDED RELEASE ORAL at 19:00

## 2020-01-19 RX ADMIN — Medication 1 MILLIGRAM(S): at 12:28

## 2020-01-19 RX ADMIN — MORPHINE SULFATE 60 MILLIGRAM(S): 50 CAPSULE, EXTENDED RELEASE ORAL at 06:36

## 2020-01-19 RX ADMIN — SODIUM CHLORIDE 125 MILLILITER(S): 9 INJECTION, SOLUTION INTRAVENOUS at 10:14

## 2020-01-19 RX ADMIN — HYDROXYUREA 500 MILLIGRAM(S): 500 CAPSULE ORAL at 06:38

## 2020-01-19 NOTE — DISCHARGE NOTE PROVIDER - CARE PROVIDER_API CALL
Estrella Salazar)  Hematology; Medical Oncology  [address] [facility] [address]  [place] [address]  Phone: [phone]  Fax: [phone]  Established Patient  Follow Up Time: 2 weeks

## 2020-01-19 NOTE — PROGRESS NOTE ADULT - ASSESSMENT
Ms. Moody is a 43 year old woman with medical history of sickle cell anemia c/b prior ACS, Vitamin D deficiency presenting with CP, lower back pain, b/l hips and knees pain due to VOC

## 2020-01-19 NOTE — PROGRESS NOTE ADULT - PROBLEM SELECTOR PLAN 1
Pain improving. No signs/symptoms suggestive of ACS at this time  - c/w 1/2 NS  - c/w Dilaudid PCA 0.4mg q6 min with 4hr max 16mg   - continue home regimen of fentanyl patch 50mcg q72h and morphine 60mg q12h  - HOLD home regimen of morphine 30mg IR PRN while on PCA pump  - daily bowel regimen --> senna 2 tabs at bedtime, miralax daily  - continue folic acid 1mg daily and hydroxyurea 500mg TID Pain improving, but still needing IV meds. No signs/symptoms suggestive of ACS at this time  - c/w 1/2 NS  - c/w Dilaudid PCA 0.4mg q6 min with 4hr max 16mg   - continue home regimen of fentanyl patch 50mcg q72h and morphine 60mg q12h  - HOLD home regimen of morphine 30mg IR PRN while on PCA pump  - daily bowel regimen --> senna 2 tabs at bedtime, miralax daily  - continue folic acid 1mg daily and hydroxyurea 500mg TID

## 2020-01-19 NOTE — PROVIDER CONTACT NOTE (CRITICAL VALUE NOTIFICATION) - ASSESSMENT
H/H 6.9/20.3. no acute distress
AOx4, no s/s of distress at this time. Pt denies fatigue, sob, or chest pain. no c/o of pain. pt stable at this time.

## 2020-01-19 NOTE — DISCHARGE NOTE PROVIDER - NSDCMRMEDTOKEN_GEN_ALL_CORE_FT
docusate sodium 100 mg oral capsule: 1 cap(s) orally 2 times a day, As Needed  fentaNYL 50 mcg/hr transdermal film, extended release: 1 film(s) transdermal every 72 hours    ISTOP  Reference #: [id number]  Quantity of 10 patches for a 30-day supply dispensed 09/20/2019  folic acid 1 mg oral tablet: 1 tab(s) orally once a day  hydroxyurea 500 mg oral capsule: 1 cap(s) orally 3 times a day  morphine 30 mg oral tablet: 1 tab(s) orally every 6 hours, As Needed  Per pharmacy, directions are 1 to 2 tablets orally every 3 hours as needed    ISTOP Reference #: [id number]  Quantity of 480 tablets for a 30-day supply dispensed 09/20/2019  MS Contin 60 mg oral tablet, extended release: 1 tab(s) orally 2 times a day    ISTOP Reference #: [id number]  Quantity of 60 tablets for a 30-day supply dispensed 09/20/2019  Multiple Vitamins oral tablet, chewable: 1 tab(s) orally once a day  Senna 8.6 mg oral tablet: 2 tab(s) orally once a day (at bedtime), As Needed  Vitamin D3 1000 intl units oral tablet: 1 tab(s) orally once a day docusate sodium 100 mg oral capsule: 1 cap(s) orally 2 times a day, As Needed  fentaNYL 50 mcg/hr transdermal film, extended release: 1 film(s) transdermal every 72 hours    ISTOP  Reference #: [id number]  Quantity of 10 patches for a 30-day supply dispensed 09/20/2019  folic acid 1 mg oral tablet: 1 tab(s) orally once a day  hydroxyurea 500 mg oral capsule: 1 cap(s) orally 3 times a day  morphine 30 mg oral tablet: 1 tab(s) orally every 6 hours, As Needed  Per pharmacy, directions are 1 to 2 tablets orally every 3 hours as needed    ISTOP Reference #: [id number]  Quantity of 480 tablets for a 30-day supply dispensed 09/20/2019  MS Contin 60 mg oral tablet, extended release: 1 tab(s) orally 2 times a day    ISTOP Reference #: [id number]  Quantity of 60 tablets for a 30-day supply dispensed 09/20/2019  Multiple Vitamins oral tablet, chewable: 1 tab(s) orally once a day  Senna 8.6 mg oral tablet: 2 tab(s) orally once a day (at bedtime), As Needed  Vitamin D3 1000 intl units oral tablet: 1 tab(s) orally once a day docusate sodium 100 mg oral capsule: 1 cap(s) orally 2 times a day, As Needed  fentaNYL 50 mcg/hr transdermal film, extended release: 1 film(s) transdermal every 72 hours    ISTOP  Reference #: [id number]  Quantity of 10 patches for a 30-day supply dispensed 09/20/2019  folic acid 1 mg oral tablet: 1 tab(s) orally once a day  hydroxyurea 500 mg oral capsule: 1 cap(s) orally 3 times a day  morphine 30 mg oral tablet: 1 tab(s) orally every 6 hours, As Needed  Per pharmacy, directions are 1 to 2 tablets orally every 3 hours as needed    ISTOP Reference #: [id number]  Quantity of 480 tablets for a 30-day supply dispensed 09/20/2019  MS Contin 60 mg oral tablet, extended release: 1 tab(s) orally 2 times a day    ISTOP Reference #: [id number]  Quantity of 60 tablets for a 30-day supply dispensed 09/20/2019  Multiple Vitamins oral tablet, chewable: 1 tab(s) orally once a day  Senna 8.6 mg oral tablet: 2 tab(s) orally once a day (at bedtime), As Needed  Vitamin D3 1000 intl units oral tablet: 1 tab(s) orally once a day

## 2020-01-19 NOTE — DISCHARGE NOTE PROVIDER - HOSPITAL COURSE
43 year old woman with medical history of sickle cell anemia c/b prior ACS, Vitamin D deficiency presented with CP, lower back pain, b/l hips and knees pain typical of her sickle cell crises. Patient was pain controlled on Dilaudid PCA and home dose Fentanyl patch. Patient without acute chest and with unknown precipitant of current crisis. On ___ this case was reviewed with  ____, the patient is medically stable and optimized for discharge. All medications were reviewed and prescriptions were sent to mutually agreed upon pharmacy. 43 year old woman with medical history of sickle cell anemia c/b prior ACS, Vitamin D deficiency presented with CP, lower back pain, b/l hips and knees pain typical of her sickle cell crises. Patient was pain controlled on Dilaudid PCA and home dose Fentanyl patch. Patient without acute chest and with unknown precipitant of current crisis. On 1/20 this case was reviewed with Dr. Nugent, the patient is medically stable and optimized for discharge. All medications were reviewed and prescriptions were sent to mutually agreed upon pharmacy.

## 2020-01-19 NOTE — PROGRESS NOTE ADULT - PROBLEM SELECTOR PLAN 4
1.  Name of PCP: Dr. Estrella Salazar, hematologist, [phone]  2.  PCP Contacted on Admission: [ ] Y    [x] N   night admit   3.  PCP contacted at Discharge: [ ] Y    [ ] N    [ ] N/A  4.  Post-Discharge Appointment Date and Location:  5.  Summary of Handoff given to PCP:

## 2020-01-19 NOTE — DISCHARGE NOTE PROVIDER - NSDCQMERRANDS_GEN_ALL_CORE
DDx includes sepsis vs hypovolemia vs adrenal insufficiency.  She may need uptitration of her fludrocortisone but would appreciate endocrinology's assistance with deciding if this is the correct course of action.  She may be hypovolemic and will give a whole 1 L LR and reassess lactic acidosis with q6h labs.  She does have an elevated procalcitonin, hypotension, tachypnea, and left shift on CBC so sepsis is high on the differential.   Will start on broad spectrum Abx pending blood cultures.  No need for UA as she is aneuric.  -Start Zosyn 4.5 g IV BID  -Patient given 2g vanc in ED, will follow up am random  -Continue midodrine 15 mg po TID  -Continue fludrocortisone 100 mcg po BID  -1 L LR bolus   No

## 2020-01-19 NOTE — DISCHARGE NOTE PROVIDER - NSDCCPCAREPLAN_GEN_ALL_CORE_FT
PRINCIPAL DISCHARGE DIAGNOSIS  Diagnosis: Sickle cell crisis  Assessment and Plan of Treatment: You were treated supportively. Continue your vitamins and medications as directed.  **Follow up with your primary care provider within 2 weeks of discharge for further care and management recommendations.

## 2020-01-19 NOTE — PROGRESS NOTE ADULT - SUBJECTIVE AND OBJECTIVE BOX
Patient is a 44y old  Female who presents with a chief complaint of Chest pain (2020 16:20)        SUBJECTIVE / OVERNIGHT EVENTS:      MEDICATIONS  (STANDING):  cholecalciferol 1000 Unit(s) Oral daily  enoxaparin Injectable 40 milliGRAM(s) SubCutaneous daily  fentaNYL   Patch  50 MICROgram(s)/Hr 1 Patch Transdermal every 72 hours  folic acid 1 milliGRAM(s) Oral daily  HYDROmorphone PCA (1 mG/mL) 30 milliLiter(s) PCA Continuous PCA Continuous  hydroxyurea (Non - oncologic) 500 milliGRAM(s) Oral three times a day  influenza   Vaccine 0.5 milliLiter(s) IntraMuscular once  morphine ER Tablet 60 milliGRAM(s) Oral every 12 hours  multivitamin 1 Tablet(s) Oral daily  senna 2 Tablet(s) Oral at bedtime    MEDICATIONS  (PRN):  naloxone Injectable 0.1 milliGRAM(s) IV Push every 3 minutes PRN For ANY of the following changes in patient status:  A. RR LESS THAN 10 breaths per minute, B. Oxygen saturation LESS THAN 90%, C. Sedation score of 6  ondansetron Injectable 4 milliGRAM(s) IV Push every 6 hours PRN Nausea      Vital Signs Last 24 Hrs  T(C): 37 (2020 14:30), Max: 37.2 (2020 18:30)  T(F): 98.6 (2020 14:30), Max: 99 (2020 18:30)  HR: 71 (2020 14:30) (63 - 79)  BP: 112/65 (2020 14:30) (97/63 - 115/66)  BP(mean): --  RR: 18 (2020 14:30) (16 - 18)  SpO2: 97% (2020 14:30) (97% - 100%)  CAPILLARY BLOOD GLUCOSE        I&O's Summary        PHYSICAL EXAM  GENERAL: NAD, well-developed  HEAD:  Atraumatic, Normocephalic  EYES: EOMI, PERRLA, conjunctiva and sclera clear  NECK: Supple, No JVD  CHEST/LUNG: Clear to auscultation bilaterally; No wheeze  HEART: Regular rate and rhythm; No murmurs, rubs, or gallops  ABDOMEN: Soft, Nontender, Nondistended; Bowel sounds present  EXTREMITIES:  2+ Peripheral Pulses, No clubbing, cyanosis, or edema  PSYCH: AAOx3  SKIN: No rashes or lesions    LABS:                        6.7    4.31  )-----------( 204      ( 2020 08:25 )             20.4         136  |  109<H>  |  5<L>  ----------------------------<  84  4.2   |  17<L>  |  0.60    Ca    7.9<L>      2020 08:25  Phos  2.7       Mg     1.6         TPro  6.4  /  Alb  3.2<L>  /  TBili  1.4<H>  /  DBili  x   /  AST  48<H>  /  ALT  12  /  AlkPhos  49      PT/INR - ( 2020 18:00 )   PT: 13.8 SEC;   INR: 1.24          PTT - ( 2020 18:00 )  PTT:33.0 SEC      Urinalysis Basic - ( 2020 18:19 )    Color: YELLOW / Appearance: CLEAR / S.014 / pH: 6.0  Gluc: NEGATIVE / Ketone: NEGATIVE  / Bili: NEGATIVE / Urobili: TRACE   Blood: NEGATIVE / Protein: 10 / Nitrite: NEGATIVE   Leuk Esterase: NEGATIVE / RBC: x / WBC x   Sq Epi: x / Non Sq Epi: x / Bacteria: x        RADIOLOGY & ADDITIONAL TESTS:    Imaging Personally Reviewed:  Consultant(s) Notes Reviewed:    Care Discussed with Consultants/Other Providers: Patient is a 44y old  Female who presents with a chief complaint of Chest pain (19 Jan 2020 16:20)    SUBJECTIVE / OVERNIGHT EVENTS:  Patient reports pain was improving and she was hoping to go home today. However, pain intensified today due to being unable to sleep overnight and feeling stressed due to neighboring patient's making distracting noises. She denies chest pain, SOB, n/v or abdominal    MEDICATIONS  (STANDING):  cholecalciferol 1000 Unit(s) Oral daily  enoxaparin Injectable 40 milliGRAM(s) SubCutaneous daily  fentaNYL   Patch  50 MICROgram(s)/Hr 1 Patch Transdermal every 72 hours  folic acid 1 milliGRAM(s) Oral daily  HYDROmorphone PCA (1 mG/mL) 30 milliLiter(s) PCA Continuous PCA Continuous  hydroxyurea (Non - oncologic) 500 milliGRAM(s) Oral three times a day  influenza   Vaccine 0.5 milliLiter(s) IntraMuscular once  morphine ER Tablet 60 milliGRAM(s) Oral every 12 hours  multivitamin 1 Tablet(s) Oral daily  senna 2 Tablet(s) Oral at bedtime    MEDICATIONS  (PRN):  naloxone Injectable 0.1 milliGRAM(s) IV Push every 3 minutes PRN For ANY of the following changes in patient status:  A. RR LESS THAN 10 breaths per minute, B. Oxygen saturation LESS THAN 90%, C. Sedation score of 6  ondansetron Injectable 4 milliGRAM(s) IV Push every 6 hours PRN Nausea      Vital Signs Last 24 Hrs  T(C): 37 (19 Jan 2020 14:30), Max: 37.2 (18 Jan 2020 18:30)  T(F): 98.6 (19 Jan 2020 14:30), Max: 99 (18 Jan 2020 18:30)  HR: 71 (19 Jan 2020 14:30) (63 - 79)  BP: 112/65 (19 Jan 2020 14:30) (97/63 - 115/66)  BP(mean): --  RR: 18 (19 Jan 2020 14:30) (16 - 18)  SpO2: 97% (19 Jan 2020 14:30) (97% - 100%)      PHYSICAL EXAM  GENERAL: NAD, well-developed  CHEST/LUNG: Clear to auscultation bilaterally; No wheeze  HEART: Regular rate and rhythm; +systolic murmur  ABDOMEN: Soft, Nontender, Nondistended; Bowel sounds present  EXTREMITIES:  2+ Peripheral Pulses, No clubbing, cyanosis, or edema  PSYCH: AAOx3        LABS:                        6.7    4.31  )-----------( 204      ( 19 Jan 2020 08:25 )             20.4     01-19    136  |  109<H>  |  5<L>  ----------------------------<  84  4.2   |  17<L>  |  0.60    Ca    7.9<L>      19 Jan 2020 08:25  Phos  2.7     01-19  Mg     1.6     01-19    TPro  6.4  /  Alb  3.2<L>  /  TBili  1.4<H>  /  DBili  x   /  AST  48<H>  /  ALT  12  /  AlkPhos  49  01-19

## 2020-01-20 ENCOUNTER — TRANSCRIPTION ENCOUNTER (OUTPATIENT)
Age: 45
End: 2020-01-20

## 2020-01-20 VITALS
HEART RATE: 62 BPM | DIASTOLIC BLOOD PRESSURE: 71 MMHG | TEMPERATURE: 98 F | SYSTOLIC BLOOD PRESSURE: 106 MMHG | OXYGEN SATURATION: 100 % | RESPIRATION RATE: 16 BRPM

## 2020-01-20 PROCEDURE — 99239 HOSP IP/OBS DSCHRG MGMT >30: CPT

## 2020-01-20 RX ORDER — FOLIC ACID 0.8 MG
1 TABLET ORAL
Qty: 0 | Refills: 0 | DISCHARGE

## 2020-01-20 RX ORDER — FOLIC ACID 0.8 MG
1 TABLET ORAL
Qty: 30 | Refills: 0
Start: 2020-01-20 | End: 2020-02-18

## 2020-01-20 RX ADMIN — MORPHINE SULFATE 60 MILLIGRAM(S): 50 CAPSULE, EXTENDED RELEASE ORAL at 07:14

## 2020-01-20 RX ADMIN — FENTANYL CITRATE 1 PATCH: 50 INJECTION INTRAVENOUS at 07:37

## 2020-01-20 RX ADMIN — HYDROXYUREA 500 MILLIGRAM(S): 500 CAPSULE ORAL at 05:00

## 2020-01-20 RX ADMIN — HYDROMORPHONE HYDROCHLORIDE 30 MILLILITER(S): 2 INJECTION INTRAMUSCULAR; INTRAVENOUS; SUBCUTANEOUS at 07:12

## 2020-01-20 RX ADMIN — Medication 1000 UNIT(S): at 12:25

## 2020-01-20 RX ADMIN — MORPHINE SULFATE 60 MILLIGRAM(S): 50 CAPSULE, EXTENDED RELEASE ORAL at 05:00

## 2020-01-20 RX ADMIN — SODIUM CHLORIDE 125 MILLILITER(S): 9 INJECTION, SOLUTION INTRAVENOUS at 14:09

## 2020-01-20 RX ADMIN — Medication 1 TABLET(S): at 12:25

## 2020-01-20 RX ADMIN — HYDROXYUREA 500 MILLIGRAM(S): 500 CAPSULE ORAL at 14:11

## 2020-01-20 RX ADMIN — Medication 1 MILLIGRAM(S): at 12:25

## 2020-01-20 NOTE — PROGRESS NOTE ADULT - ATTENDING COMMENTS
Will DC home today  Patient hemodynamically stable for discharge home  Time spent in discharge process is 40 min

## 2020-01-20 NOTE — PROGRESS NOTE ADULT - PROBLEM SELECTOR PLAN 1
Pain improving, but still needing IV meds. No signs/symptoms suggestive of ACS at this time  - on 1/2 NS  - c/w Dilaudid PCA 0.4mg q6 min with 4hr max 16mg   - continue home regimen of fentanyl patch 50mcg q72h and morphine 60mg q12h  - HOLD home regimen of morphine 30mg IR PRN while on PCA pump  - daily bowel regimen --> senna 2 tabs at bedtime, miralax daily  - continue folic acid 1mg daily and hydroxyurea 500mg TID

## 2020-01-20 NOTE — PROGRESS NOTE ADULT - SUBJECTIVE AND OBJECTIVE BOX
Patient is a 44y old  Female who presents with a chief complaint of Sickle cell crisis (19 Jan 2020 16:31)      SUBJECTIVE / OVERNIGHT EVENTS: patient seen and examined by bedside, pt feeling better , pain improved, pt wants to go home today       MEDICATIONS  (STANDING):  cholecalciferol 1000 Unit(s) Oral daily  enoxaparin Injectable 40 milliGRAM(s) SubCutaneous daily  fentaNYL   Patch  50 MICROgram(s)/Hr 1 Patch Transdermal every 72 hours  folic acid 1 milliGRAM(s) Oral daily  HYDROmorphone PCA (1 mG/mL) 30 milliLiter(s) PCA Continuous PCA Continuous  hydroxyurea (Non - oncologic) 500 milliGRAM(s) Oral three times a day  influenza   Vaccine 0.5 milliLiter(s) IntraMuscular once  morphine ER Tablet 60 milliGRAM(s) Oral every 12 hours  multivitamin 1 Tablet(s) Oral daily  senna 2 Tablet(s) Oral at bedtime  sodium chloride 0.45%. 1000 milliLiter(s) (125 mL/Hr) IV Continuous <Continuous>    MEDICATIONS  (PRN):  acetaminophen   Tablet .. 650 milliGRAM(s) Oral every 6 hours PRN Mild Pain (1 - 3)  naloxone Injectable 0.1 milliGRAM(s) IV Push every 3 minutes PRN For ANY of the following changes in patient status:  A. RR LESS THAN 10 breaths per minute, B. Oxygen saturation LESS THAN 90%, C. Sedation score of 6  ondansetron Injectable 4 milliGRAM(s) IV Push every 6 hours PRN Nausea      Vital Signs Last 24 Hrs  T(C): 36.6 (20 Jan 2020 14:10), Max: 36.9 (20 Jan 2020 10:30)  T(F): 97.8 (20 Jan 2020 14:10), Max: 98.5 (20 Jan 2020 10:30)  HR: 62 (20 Jan 2020 14:10) (62 - 72)  BP: 106/71 (20 Jan 2020 14:10) (100/58 - 106/71)  BP(mean): --  RR: 16 (20 Jan 2020 14:10) (16 - 18)  SpO2: 100% (20 Jan 2020 14:10) (98% - 100%)  CAPILLARY BLOOD GLUCOSE        I&O's Summary      PHYSICAL EXAM:  GENERAL: NAD, well-developed  HEAD:  Atraumatic, Normocephalic  EYES: EOMI, PERRLA, conjunctiva and sclera clear  CHEST/LUNG: Clear to auscultation bilaterally; No wheeze  HEART: Regular rate and rhythm;   ABDOMEN: Soft, Nontender, Nondistended; Bowel sounds present  EXTREMITIES:  2+ Peripheral Pulses, No clubbing, cyanosis, or edema  PSYCH: AAOx3  NEUROLOGY: non-focal  SKIN: No rashes or lesions    LABS:                        6.7    4.31  )-----------( 204      ( 19 Jan 2020 08:25 )             20.4     01-19    136  |  109<H>  |  5<L>  ----------------------------<  84  4.2   |  17<L>  |  0.60    Ca    7.9<L>      19 Jan 2020 08:25  Phos  2.7     01-19  Mg     1.6     01-19    TPro  6.4  /  Alb  3.2<L>  /  TBili  1.4<H>  /  DBili  x   /  AST  48<H>  /  ALT  12  /  AlkPhos  49  01-19              RADIOLOGY & ADDITIONAL TESTS:    Imaging Personally Reviewed:    Consultant(s) Notes Reviewed:      Care Discussed with Consultants/Other Providers:

## 2020-01-20 NOTE — DISCHARGE NOTE NURSING/CASE MANAGEMENT/SOCIAL WORK - PATIENT PORTAL LINK FT
You can access the FollowMyHealth Patient Portal offered by [facility] by registering at the following website: [email or web address]. By joining [facility]’s FollowMyHealth portal, you will also be able to view your health information using other applications (apps) compatible with our system.

## 2020-01-20 NOTE — PROGRESS NOTE ADULT - PROBLEM SELECTOR PLAN 4
1.  Name of PCP: Dr. Estrella Salazar, hematologist, [phone]  2.  PCP Contacted on Admission: [ ] Y    [x] N   night admit   3.  PCP contacted at Discharge: [ ] Y    [ ] N    [ ] N/A  4.  Post-Discharge Appointment Date and Location:  5.  Summary of Handoff given to PCP:

## 2020-01-21 LAB — ANTIBODY ID 1_1: SIGNIFICANT CHANGE UP

## 2020-03-12 NOTE — PROGRESS NOTE ADULT - PROBLEM SELECTOR PROBLEM 1
Patient was in the office today for a Maternal Quad sceen. Draw per physician order using sterile technique.   Drawn from the Left antecubital. Sickle cell crisis

## 2020-09-25 ENCOUNTER — EMERGENCY (EMERGENCY)
Facility: HOSPITAL | Age: 45
LOS: 1 days | Discharge: HOME | End: 2020-09-25
Attending: EMERGENCY MEDICINE | Admitting: EMERGENCY MEDICINE
Payer: MEDICAID

## 2020-09-25 VITALS
HEART RATE: 80 BPM | DIASTOLIC BLOOD PRESSURE: 56 MMHG | OXYGEN SATURATION: 100 % | SYSTOLIC BLOOD PRESSURE: 113 MMHG | TEMPERATURE: 99 F | RESPIRATION RATE: 18 BRPM

## 2020-09-25 VITALS
OXYGEN SATURATION: 100 % | RESPIRATION RATE: 17 BRPM | TEMPERATURE: 98 F | SYSTOLIC BLOOD PRESSURE: 104 MMHG | DIASTOLIC BLOOD PRESSURE: 53 MMHG | HEART RATE: 82 BPM | HEIGHT: 67 IN

## 2020-09-25 DIAGNOSIS — Z98.891 HISTORY OF UTERINE SCAR FROM PREVIOUS SURGERY: Chronic | ICD-10-CM

## 2020-09-25 DIAGNOSIS — Z98.890 OTHER SPECIFIED POSTPROCEDURAL STATES: Chronic | ICD-10-CM

## 2020-09-25 LAB
ALBUMIN SERPL ELPH-MCNC: 4.3 G/DL — SIGNIFICANT CHANGE UP (ref 3.3–5)
ALP SERPL-CCNC: 50 U/L — SIGNIFICANT CHANGE UP (ref 40–120)
ALT FLD-CCNC: 9 U/L — SIGNIFICANT CHANGE UP (ref 4–33)
ANION GAP SERPL CALC-SCNC: 11 MMO/L — SIGNIFICANT CHANGE UP (ref 7–14)
AST SERPL-CCNC: 57 U/L — HIGH (ref 4–32)
BILIRUB SERPL-MCNC: 2 MG/DL — HIGH (ref 0.2–1.2)
BUN SERPL-MCNC: 9 MG/DL — SIGNIFICANT CHANGE UP (ref 7–23)
CALCIUM SERPL-MCNC: 9.1 MG/DL — SIGNIFICANT CHANGE UP (ref 8.4–10.5)
CHLORIDE SERPL-SCNC: 105 MMOL/L — SIGNIFICANT CHANGE UP (ref 98–107)
CO2 SERPL-SCNC: 23 MMOL/L — SIGNIFICANT CHANGE UP (ref 22–31)
CREAT SERPL-MCNC: 0.76 MG/DL — SIGNIFICANT CHANGE UP (ref 0.5–1.3)
GLUCOSE SERPL-MCNC: 102 MG/DL — HIGH (ref 70–99)
MAGNESIUM SERPL-MCNC: 1.7 MG/DL — SIGNIFICANT CHANGE UP (ref 1.6–2.6)
PHOSPHATE SERPL-MCNC: 3.2 MG/DL — SIGNIFICANT CHANGE UP (ref 2.5–4.5)
POTASSIUM SERPL-MCNC: 4.6 MMOL/L — SIGNIFICANT CHANGE UP (ref 3.5–5.3)
POTASSIUM SERPL-SCNC: 4.6 MMOL/L — SIGNIFICANT CHANGE UP (ref 3.5–5.3)
PROT SERPL-MCNC: 7.9 G/DL — SIGNIFICANT CHANGE UP (ref 6–8.3)
RETICS #: 80 K/UL — SIGNIFICANT CHANGE UP (ref 25–125)
RETICS/RBC NFR: 3 % — HIGH (ref 0.5–2.5)
SODIUM SERPL-SCNC: 139 MMOL/L — SIGNIFICANT CHANGE UP (ref 135–145)

## 2020-09-25 PROCEDURE — 99285 EMERGENCY DEPT VISIT HI MDM: CPT

## 2020-09-25 PROCEDURE — 73562 X-RAY EXAM OF KNEE 3: CPT | Mod: 26,50

## 2020-09-25 PROCEDURE — 73030 X-RAY EXAM OF SHOULDER: CPT | Mod: 26,50

## 2020-09-25 PROCEDURE — 73070 X-RAY EXAM OF ELBOW: CPT | Mod: 26,50

## 2020-09-25 PROCEDURE — 71045 X-RAY EXAM CHEST 1 VIEW: CPT | Mod: 26

## 2020-09-25 RX ORDER — DIPHENHYDRAMINE HCL 50 MG
25 CAPSULE ORAL ONCE
Refills: 0 | Status: COMPLETED | OUTPATIENT
Start: 2020-09-25 | End: 2020-09-25

## 2020-09-25 RX ORDER — HYDROMORPHONE HYDROCHLORIDE 2 MG/ML
1 INJECTION INTRAMUSCULAR; INTRAVENOUS; SUBCUTANEOUS ONCE
Refills: 0 | Status: DISCONTINUED | OUTPATIENT
Start: 2020-09-25 | End: 2020-09-25

## 2020-09-25 RX ORDER — SODIUM CHLORIDE 9 MG/ML
1000 INJECTION, SOLUTION INTRAVENOUS
Refills: 0 | Status: DISCONTINUED | OUTPATIENT
Start: 2020-09-25 | End: 2020-09-29

## 2020-09-25 RX ORDER — MORPHINE SULFATE 50 MG/1
8 CAPSULE, EXTENDED RELEASE ORAL ONCE
Refills: 0 | Status: DISCONTINUED | OUTPATIENT
Start: 2020-09-25 | End: 2020-09-25

## 2020-09-25 RX ORDER — OLANZAPINE 15 MG/1
2.5 TABLET, FILM COATED ORAL ONCE
Refills: 0 | Status: DISCONTINUED | OUTPATIENT
Start: 2020-09-25 | End: 2020-09-25

## 2020-09-25 RX ADMIN — HYDROMORPHONE HYDROCHLORIDE 1 MILLIGRAM(S): 2 INJECTION INTRAMUSCULAR; INTRAVENOUS; SUBCUTANEOUS at 19:15

## 2020-09-25 RX ADMIN — Medication 25 MILLIGRAM(S): at 18:19

## 2020-09-25 RX ADMIN — MORPHINE SULFATE 8 MILLIGRAM(S): 50 CAPSULE, EXTENDED RELEASE ORAL at 18:19

## 2020-09-25 RX ADMIN — SODIUM CHLORIDE 1000 MILLILITER(S): 9 INJECTION, SOLUTION INTRAVENOUS at 19:15

## 2020-09-25 NOTE — ED PROVIDER NOTE - PROGRESS NOTE DETAILS
Teodoro: patient endorsed to me from sign-out. Patient feeling better and wants to go home. Safe for discharge with outpatient hematology follow up.

## 2020-09-25 NOTE — ED PROVIDER NOTE - CLINICAL SUMMARY MEDICAL DECISION MAKING FREE TEXT BOX
45 y/o F with hx of sickle cell presenting with concern for sickle cell crisis, will check labs, retic, xrays. Pain meds, fluids, reassess - David Kim, DO PGY-2

## 2020-09-25 NOTE — ED ADULT NURSE NOTE - OBJECTIVE STATEMENT
Pt awake, alert and oriented x 4 PMH sickle cell c/o B/L arm and leg pain for two weeks unresolved with home PO meds.   denies fever, denies n/v/d.   denies chest pain or shortness of breath.   Respirations even and unlabored.   IV placed and labs sent.   pain meds to be given.   pt reports rash with morphine - requests benadryl prior to morphine.   placed on cardiac monitor and pulse ox.

## 2020-09-25 NOTE — ED PROVIDER NOTE - ATTENDING CONTRIBUTION TO CARE
MD Vasquez:  patient seen and evaluated with the resident.  I was present for key portions of the History & Physical, and I agree with the Impression & Plan.  MD Vasquez:   43 y/o F c/o shoulder pain, elbow, and knee pain.  Duration: 2wk, progressively getting worse.  Quality:  similar to prior sickle cell pain.  Takes morphine and oxycodone at home.   VS: wnl.  Physical Exam: adult F, uncomfortable appearing, NCAT, PERRL, EOMI, neck supple, RRR, CTA B, Abd: s/nd/nt, Ext: no edema, Neuro: AAOx3, gait not assessed, strength 5/5 & symmetric throughout.  Impression: sickle cell crisis.    Plan:  labs, reticulocyte, IVF, pain meds reassess. MD Vasquez:  patient seen and evaluated with the resident.  I was present for key portions of the History & Physical, and I agree with the Impression & Plan.  MD Vasquez:   45 y/o F c/o shoulder pain, elbow, and knee pain.  Duration: 2wk, progressively getting worse.  Quality:  similar to prior sickle cell pain.  Takes morphine and oxycodone at home.   VS: wnl.  Physical Exam: adult F, uncomfortable appearing, NCAT, PERRL, EOMI, neck supple, RRR, CTA B, Abd: s/nd/nt, Ext: no edema, Neuro: AAOx3, gait not assessed, strength 5/5 & symmetric throughout.  Impression: sickle cell crisis, w/o clinical evidence of acute chest syndrome.  Plan:  labs, reticulocyte, IVF, pain meds reassess.

## 2020-09-25 NOTE — ED PROVIDER NOTE - PATIENT PORTAL LINK FT
You can access the FollowMyHealth Patient Portal offered by [facility] by registering at the following website: [email or web address]. By joining [employer]’s FollowMyHealth portal, you will also be able to view your health information using other applications (apps) compatible with our system.

## 2020-09-25 NOTE — ED PROVIDER NOTE - NS ED ROS FT
CONSTITUTIONAL: No fevers, no chills, no lightheadedness, no dizziness  Eyes: no visual changes  Ears: no ear drainage, no ear pain  Nose: no nasal congestion  Mouth/Throat: no sore throat  CV: No chest pain, no palpitations  PULM: No SOB, no cough  GI: No n/v/d, no abd pain  : no dysuria, no hematuria  SKIN: no rashes.  MSK: +joint pain  NEURO: no headache, no focal weakness or numbness  LYMPH/VASC: no LE swelling

## 2020-09-25 NOTE — ED ADULT TRIAGE NOTE - CHIEF COMPLAINT QUOTE
Pt comes to ED in [facility]. Pt has had pain to her arms bilat, legs bilat, and back X2 weeks. States she has been trying to treat her pain at home but cannot tolerate the pain anymore. Hx of acute chest syndrome. No chest pain at this time.

## 2020-09-25 NOTE — ED PROVIDER NOTE - NSFOLLOWUPINSTRUCTIONS_ED_ALL_ED_FT
Your diagnosis: sickle cell pain    Discharge instructions:    - Please follow up with your hematologist.    - Tylenol up to 650 mg every 8 hours as needed for pain and/or Motrin up to 600 mg every 6 hours as needed for pain. Take any prescribed medications as instructed: pain meds prescribed by your hematologist as prescribed.    - Be sure to return to the ED if you develop new or worsening symptoms. Specific signs and symptoms to be vigilant of: fever or chills, chest pain, difficulty breathing, worsening or persistent pain.

## 2020-09-25 NOTE — ED PROVIDER NOTE - OBJECTIVE STATEMENT
43 y/o F with PMH of SCC with acute chest (never intubated) presenting with sickle cell crisis. Patient states she has had pain for last 2 weeks, able to control pain at home with home meds until today. No fevers, chills, n/v/d, abd pain, cp, sob. Compliant on home meds

## 2020-09-25 NOTE — ED PROVIDER NOTE - PHYSICAL EXAMINATION
Physical Exam: adult F, uncomfortable appearing, NCAT, PERRL, EOMI, neck supple, RRR, CTA B, Abd: s/nd/nt, Ext: no edema, Neuro: AAOx3, gait not assessed, strength 5/5 & symmetric throughout, Psych: normal affect
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
## 2021-02-15 ENCOUNTER — EMERGENCY (EMERGENCY)
Facility: HOSPITAL | Age: 46
LOS: 1 days | Discharge: HOME | End: 2021-02-15
Attending: EMERGENCY MEDICINE | Admitting: EMERGENCY MEDICINE
Payer: MEDICAID

## 2021-02-15 VITALS
DIASTOLIC BLOOD PRESSURE: 56 MMHG | HEART RATE: 86 BPM | HEIGHT: 67 IN | RESPIRATION RATE: 16 BRPM | SYSTOLIC BLOOD PRESSURE: 114 MMHG | TEMPERATURE: 98 F | OXYGEN SATURATION: 100 %

## 2021-02-15 VITALS
DIASTOLIC BLOOD PRESSURE: 57 MMHG | HEART RATE: 80 BPM | SYSTOLIC BLOOD PRESSURE: 104 MMHG | OXYGEN SATURATION: 100 % | RESPIRATION RATE: 12 BRPM | TEMPERATURE: 98 F

## 2021-02-15 DIAGNOSIS — Z98.891 HISTORY OF UTERINE SCAR FROM PREVIOUS SURGERY: Chronic | ICD-10-CM

## 2021-02-15 DIAGNOSIS — Z98.890 OTHER SPECIFIED POSTPROCEDURAL STATES: Chronic | ICD-10-CM

## 2021-02-15 LAB
ALBUMIN SERPL ELPH-MCNC: 4.3 G/DL — SIGNIFICANT CHANGE UP (ref 3.3–5)
ALP SERPL-CCNC: 51 U/L — SIGNIFICANT CHANGE UP (ref 40–120)
ALT FLD-CCNC: 7 U/L — SIGNIFICANT CHANGE UP (ref 4–33)
ANION GAP SERPL CALC-SCNC: 9 MMOL/L — SIGNIFICANT CHANGE UP (ref 7–14)
APTT BLD: 34.6 SEC — SIGNIFICANT CHANGE UP (ref 27–36.3)
AST SERPL-CCNC: 38 U/L — HIGH (ref 4–32)
BASOPHILS # BLD AUTO: 0.02 K/UL — SIGNIFICANT CHANGE UP (ref 0–0.2)
BASOPHILS NFR BLD AUTO: 0.3 % — SIGNIFICANT CHANGE UP (ref 0–2)
BILIRUB SERPL-MCNC: 2.3 MG/DL — HIGH (ref 0.2–1.2)
BLD GP AB SCN SERPL QL: POSITIVE — SIGNIFICANT CHANGE UP
BUN SERPL-MCNC: 8 MG/DL — SIGNIFICANT CHANGE UP (ref 7–23)
CALCIUM SERPL-MCNC: 9.1 MG/DL — SIGNIFICANT CHANGE UP (ref 8.4–10.5)
CHLORIDE SERPL-SCNC: 102 MMOL/L — SIGNIFICANT CHANGE UP (ref 98–107)
CO2 SERPL-SCNC: 27 MMOL/L — SIGNIFICANT CHANGE UP (ref 22–31)
CREAT SERPL-MCNC: 0.74 MG/DL — SIGNIFICANT CHANGE UP (ref 0.5–1.3)
EOSINOPHIL # BLD AUTO: 0.34 K/UL — SIGNIFICANT CHANGE UP (ref 0–0.5)
EOSINOPHIL NFR BLD AUTO: 5.9 % — SIGNIFICANT CHANGE UP (ref 0–6)
GLUCOSE SERPL-MCNC: 89 MG/DL — SIGNIFICANT CHANGE UP (ref 70–99)
HCT VFR BLD CALC: 21.2 % — LOW (ref 34.5–45)
HGB BLD-MCNC: 7.2 G/DL — LOW (ref 11.5–15.5)
IANC: 3.21 K/UL — SIGNIFICANT CHANGE UP (ref 1.5–8.5)
IMM GRANULOCYTES NFR BLD AUTO: 0.3 % — SIGNIFICANT CHANGE UP (ref 0–1.5)
INR BLD: 1.24 RATIO — HIGH (ref 0.88–1.16)
LYMPHOCYTES # BLD AUTO: 1.64 K/UL — SIGNIFICANT CHANGE UP (ref 1–3.3)
LYMPHOCYTES # BLD AUTO: 28.3 % — SIGNIFICANT CHANGE UP (ref 13–44)
MCHC RBC-ENTMCNC: 27.4 PG — SIGNIFICANT CHANGE UP (ref 27–34)
MCHC RBC-ENTMCNC: 34 GM/DL — SIGNIFICANT CHANGE UP (ref 32–36)
MCV RBC AUTO: 80.6 FL — SIGNIFICANT CHANGE UP (ref 80–100)
MONOCYTES # BLD AUTO: 0.57 K/UL — SIGNIFICANT CHANGE UP (ref 0–0.9)
MONOCYTES NFR BLD AUTO: 9.8 % — SIGNIFICANT CHANGE UP (ref 2–14)
NEUTROPHILS # BLD AUTO: 3.21 K/UL — SIGNIFICANT CHANGE UP (ref 1.8–7.4)
NEUTROPHILS NFR BLD AUTO: 55.4 % — SIGNIFICANT CHANGE UP (ref 43–77)
NRBC # BLD: 1 /100 WBCS — SIGNIFICANT CHANGE UP
NRBC # FLD: 0.07 K/UL — HIGH
PLATELET # BLD AUTO: 212 K/UL — SIGNIFICANT CHANGE UP (ref 150–400)
POTASSIUM SERPL-MCNC: 3.7 MMOL/L — SIGNIFICANT CHANGE UP (ref 3.5–5.3)
POTASSIUM SERPL-SCNC: 3.7 MMOL/L — SIGNIFICANT CHANGE UP (ref 3.5–5.3)
PROT SERPL-MCNC: 7.8 G/DL — SIGNIFICANT CHANGE UP (ref 6–8.3)
PROTHROM AB SERPL-ACNC: 14.1 SEC — HIGH (ref 10.6–13.6)
RBC # BLD: 2.63 M/UL — LOW (ref 3.8–5.2)
RBC # FLD: 14.1 % — SIGNIFICANT CHANGE UP (ref 10.3–14.5)
RH IG SCN BLD-IMP: POSITIVE — SIGNIFICANT CHANGE UP
SODIUM SERPL-SCNC: 138 MMOL/L — SIGNIFICANT CHANGE UP (ref 135–145)
WBC # BLD: 5.8 K/UL — SIGNIFICANT CHANGE UP (ref 3.8–10.5)
WBC # FLD AUTO: 5.8 K/UL — SIGNIFICANT CHANGE UP (ref 3.8–10.5)

## 2021-02-15 PROCEDURE — 86077 PHYS BLOOD BANK SERV XMATCH: CPT

## 2021-02-15 PROCEDURE — 99285 EMERGENCY DEPT VISIT HI MDM: CPT

## 2021-02-15 RX ORDER — HYDROMORPHONE HYDROCHLORIDE 2 MG/ML
1 INJECTION INTRAMUSCULAR; INTRAVENOUS; SUBCUTANEOUS ONCE
Refills: 0 | Status: DISCONTINUED | OUTPATIENT
Start: 2021-02-15 | End: 2021-02-15

## 2021-02-15 RX ORDER — DIPHENHYDRAMINE HCL 50 MG
25 CAPSULE ORAL ONCE
Refills: 0 | Status: COMPLETED | OUTPATIENT
Start: 2021-02-15 | End: 2021-02-15

## 2021-02-15 RX ORDER — SODIUM CHLORIDE 9 MG/ML
1000 INJECTION, SOLUTION INTRAVENOUS ONCE
Refills: 0 | Status: COMPLETED | OUTPATIENT
Start: 2021-02-15 | End: 2021-02-15

## 2021-02-15 RX ORDER — MORPHINE SULFATE 50 MG/1
6 CAPSULE, EXTENDED RELEASE ORAL ONCE
Refills: 0 | Status: DISCONTINUED | OUTPATIENT
Start: 2021-02-15 | End: 2021-02-15

## 2021-02-15 RX ADMIN — MORPHINE SULFATE 6 MILLIGRAM(S): 50 CAPSULE, EXTENDED RELEASE ORAL at 13:35

## 2021-02-15 RX ADMIN — SODIUM CHLORIDE 1000 MILLILITER(S): 9 INJECTION, SOLUTION INTRAVENOUS at 15:57

## 2021-02-15 RX ADMIN — HYDROMORPHONE HYDROCHLORIDE 1 MILLIGRAM(S): 2 INJECTION INTRAMUSCULAR; INTRAVENOUS; SUBCUTANEOUS at 17:11

## 2021-02-15 RX ADMIN — HYDROMORPHONE HYDROCHLORIDE 1 MILLIGRAM(S): 2 INJECTION INTRAMUSCULAR; INTRAVENOUS; SUBCUTANEOUS at 15:05

## 2021-02-15 RX ADMIN — HYDROMORPHONE HYDROCHLORIDE 1 MILLIGRAM(S): 2 INJECTION INTRAMUSCULAR; INTRAVENOUS; SUBCUTANEOUS at 14:20

## 2021-02-15 RX ADMIN — Medication 25 MILLIGRAM(S): at 13:35

## 2021-02-15 NOTE — ED PROVIDER NOTE - INTERNATIONAL TRAVEL
Impression: Presbyopia: H52.4. Plan: Presbyopia is the inability to focus on objects (ie: accommodate) due to the loss of flexibility of your natural lens. Presbyopia occurs with age. Reading glasses, bifocals, trifocals or contacts can be helpful. Contact office if difficulty focusing persists despite corrective eyewear. New glasses RX given today. No

## 2021-02-15 NOTE — ED PROVIDER NOTE - CLINICAL SUMMARY MEDICAL DECISION MAKING FREE TEXT BOX
Patient presents to the ed for 2 weeks of r. side atraumatic neck and r. upper back pain c/w her sickle cell crisis pain. Also with vaginal bleeding intermittently x 2 months, very light over last few days, has implant in arm for birth control. Patient appears nontoxic, mild tenderness to region of pain, no other findings on exam, vss, nontoxic appearing. Patient requesting gyn for removal of implant-will refer to [facility] center to assist with making an appointment. Will check labs to eval for anemia/elec disturbance, will treat pain and hydrate, and reassess.

## 2021-02-15 NOTE — ED PROVIDER NOTE - CARE PLAN
Principal Discharge DX:	Sickle cell crisis  Secondary Diagnosis:	Anemia  Secondary Diagnosis:	Vaginal bleeding

## 2021-02-15 NOTE — ED PROVIDER NOTE - PHYSICAL EXAMINATION
GEN - NAD; nontoxic appearing; A+O x3   HEAD - NC/AT   EYES- PERRL, EOMI  ENT: Airway patent, mmm, Oral cavity and pharynx normal. No inflammation, swelling, exudate, or lesions.    NECK: Neck supple, no masses, mild ttp r. paraspinal muscle region and trapezius region, no skin changes  PULMONARY - CTA b/l, symmetric breath sounds.   CARDIAC -s1s2, RRR, no M,G,R  ABDOMEN - +BS, ND, NT, soft, no guarding, no rebound, no masses   BACK - no CVA tenderness, Normal  spine   EXTREMITIES - FROM, no deformity  SKIN - no rash or bruising   NEUROLOGIC - alert, speech clear, no focal deficits  PSYCH -nl mood/affect, nl insight. GEN - NAD; nontoxic appearing; A+O x3   HEAD - NC/AT   EYES- PERRL, EOMI  ENT: Airway patent, mmm, Oral cavity and pharynx normal. No inflammation, swelling, exudate, or lesions.    NECK: Neck supple, no masses, mild ttp r. paraspinal muscle region and trapezius region, no skin changes  PULMONARY - CTA b/l, symmetric breath sounds.   CARDIAC -s1s2, RRR, no M,G,R  ABDOMEN - +BS, ND, NT, soft, no guarding, no rebound, no masses   BACK - no CVA tenderness, Normal  spine   EXTREMITIES - FROM, no deformity  SKIN - no rash or bruising   NEUROLOGIC - alert, speech clear, no focal deficits  PSYCH -nl mood/affect, nl insight.    GLENN Mcnally: OBGYN: Chaperoned by female RIKKI Wilson, no lesions on external genitalia. Os is closed, no blood in the vault, no adnexal tenderness bilaterally.

## 2021-02-15 NOTE — ED ADULT NURSE NOTE - NSIMPLEMENTINTERV_GEN_ALL_ED
Implemented All Universal Safety Interventions:  [place] to call system. Call bell, personal items and telephone within reach. Instruct patient to call for assistance. Room bathroom lighting operational. Non-slip footwear when patient is off stretcher. Physically safe environment: no spills, clutter or unnecessary equipment. Stretcher in lowest position, wheels locked, appropriate side rails in place.

## 2021-02-15 NOTE — ED PROVIDER NOTE - NSFOLLOWUPINSTRUCTIONS_ED_ALL_ED_FT
Follow up with your primary doctor, hematologist and OBGYN. The ER will call you to arrange follow up with an OBGYN. You can also call  to schedule an appointment. Advance activity as tolerated.  Continue all previously prescribed medications as directed.  Follow up with your primary care physician in 48-72 hours- bring copies of your results.  Return to the ER for worsening or persistent symptoms, and/or ANY NEW OR CONCERNING SYMPTOMS. This inlcludes but is not limited to lightheadedness, increasing or heavy vaginal bleeding or for any other symptoms that concern you. If you have issues obtaining follow up, please call: [phone] to obtain a doctor or specialist who takes your insurance in your area.  You may call [phone] to make an appointment with the internal medicine clinic.

## 2021-02-15 NOTE — ED PROVIDER NOTE - NS ED ROS FT
ROS:  GENERAL: No fever, no chills  EYES: no change in vision  HEENT: no trouble swallowing, no trouble speaking  CARDIAC: no chest pain  PULMONARY: no cough, no shortness of breath  GI: no abdominal pain, no nausea, no vomiting, no diarrhea, no constipation  : +vaginal bleeding, No dysuria, no frequency, no change in appearance, or odor of urine  SKIN: no rashes  NEURO: no headache, no weakness  MSK: +r. neck and upper back pain

## 2021-02-15 NOTE — ED PROVIDER NOTE - PROGRESS NOTE DETAILS
GLENN Mcnally: Pt reassessed, pain is improved, will D/C with outpatient OBGYN F/U. All results d/w patient and copies given with instructions to bring with them to their follow up appointment.  The patient was given verbal and written discharge instructions Specifically, instructions when to return to the ED and to seek follow-up from their pcp within 1-2 days. The patient understands that should their symptoms worsen or any new symptoms arise, they should return to the ED immediately for further evaluation.  Vss, NAD, tolerating po and ambulating steadily at discharge.

## 2021-02-15 NOTE — ED PROVIDER NOTE - OBJECTIVE STATEMENT
46 y/o f presents to the ED with r. side neck and upper back pain intermittently x 2 weeks, consistent with her sickle cell crisis pain. Patient states she has been trying to control the pain at home with her sickle cell doctors assistance, utilizing po morphine without any relief, but pain has continued. Patient also states she has had intermittent vaginal bleeding x 2 months in setting of birth control arm implant which she has had for last few years. No fevers, chills, cough, chest pain, sob, abd pain, vomiting, diarrhea, dysuria, no focal weakness or numbness. Very light spotting over last few days, heavy bleeding has stopped.

## 2021-02-15 NOTE — ED PROVIDER NOTE - PATIENT PORTAL LINK FT
You can access the FollowMyHealth Patient Portal offered by [facility] by registering at the following website: [email or web address]. By joining [employer]’s FollowMyHealth portal, you will also be able to view your health information using other applications (apps) compatible with our system.

## 2021-02-15 NOTE — ED ADULT TRIAGE NOTE - CHIEF COMPLAINT QUOTE
Pt c/o pain to legs arms and neck for the past 2 weeks c/w pain crisis.  Pt also c/o vaginal bleeding x 2 months. Past medical hx sickle cell

## 2021-02-19 ENCOUNTER — APPOINTMENT (OUTPATIENT)
Dept: OBGYN | Facility: CLINIC | Age: 46
End: 2021-02-19
Payer: MEDICAID

## 2021-02-19 PROCEDURE — 99072 ADDL SUPL MATRL&STAF TM PHE: CPT

## 2021-02-19 PROCEDURE — 99386 PREV VISIT NEW AGE 40-64: CPT

## 2021-02-25 NOTE — DISCHARGE NOTE NURSING/CASE MANAGEMENT/SOCIAL WORK - NSDPDISTO_GEN_ALL_CORE
[Normocephalic] : normocephalic [Atraumatic] : atraumatic [EOMI] : extra ocular movement intact [Supple] : supple Home [No Supraclavicular Adenopathy] : no supraclavicular adenopathy [No Cervical Adenopathy] : no cervical adenopathy [Examined in the supine and seated position] : examined in the supine and seated position [Symmetrical] : symmetrical [No dominant masses] : no dominant masses in right breast  [No dominant masses] : no dominant masses left breast [No Nipple Retraction] : no left nipple retraction [No Nipple Discharge] : no left nipple discharge [Breast Mass Right Breast ___cm] : no masses [Breast Mass Left Breast ___cm] : no masses [Breast Nipple Inversion] : nipples not inverted [Breast Nipple Retraction] : nipples not retracted [Breast Nipple Flattening] : nipples not flattened [Breast Nipple Fissures] : nipples not fissured [Breast Abnormal Lactation (Galactorrhea)] : no galactorrhea [Breast Abnormal Secretion Bloody Fluid] : no bloody discharge [Breast Abnormal Secretion Serous Fluid] : no serous discharge [Breast Abnormal Secretion Opalescent Fluid] : no milky discharge [No Axillary Lymphadenopathy] : no left axillary lymphadenopathy [No Edema] : no edema [No Rashes] : no rashes [No Ulceration] : no ulceration [de-identified] : On exam, the patient has small B-cup breasts.  I cannot feel any suspicious densities in either breast.  She has no axillary, supraclavicular, or cervical adenopathy.

## 2021-03-19 ENCOUNTER — APPOINTMENT (OUTPATIENT)
Dept: ANTEPARTUM | Facility: CLINIC | Age: 46
End: 2021-03-19

## 2021-03-19 ENCOUNTER — APPOINTMENT (OUTPATIENT)
Dept: OBGYN | Facility: CLINIC | Age: 46
End: 2021-03-19

## 2021-06-13 NOTE — ED ADULT NURSE NOTE - CAS EDN DISCHARGE ASSESSMENT
Controlled Substance Refill Request  Medication Name:   Requested Prescriptions     Pending Prescriptions Disp Refills     zolpidem (AMBIEN CR) 6.25 MG CR tablet [Pharmacy Med Name: Zolpidem Tartrate ER Oral Tablet Extended Release 6.25 MG] 45 tablet 0     Sig: take 1 and 1/4 tablet by mouth at bedtime     Date Last Fill: 11/6/20  [facility]ed [facility]acy: [facility]  Submit electronically to pharmacy  Controlled Substance Agreement on file:   Encounter-Level CSA Scan Date - 01/08/2019:    Scan on 1/14/2019 10:21 AM        Last office visit:  7/16/20        
Alert and oriented to person, place and time

## 2021-06-28 NOTE — PROGRESS NOTE ADULT - SUBJECTIVE AND OBJECTIVE BOX
6/28/2021                   Edouard Keller  [id number] [place] [address]      Dear Edouard,    We are writing to inform you that you are due for your annual Medicare Wellness Exam.    This appointment will focus on preventive care including a Personalized Prevention Plan, a check of your weight, blood pressure and pulse, a brief check of your hearing and vision, any immunizations you may need, as well as referrals for any other screening services or test you may need.    Medicare will cover the cost of the preventive care services.  Any additional services including lab and some immunizations may not be a covered service.  If you have any other health concerns or questions to discuss with your provider, a medical visit may be added to your wellness visit and you may need to pay a deductible or co-pay depending on your Medicare plan.    Please call our office at [phone] to schedule your appointment with Kevin Zapata MD .    We look forward to seeing you soon.    Kevin Zapata MD  [id number]  [facility] [id number] [phone]   Patient is a 43y old  Female who presents with a chief complaint of Ss crisis (17 Mar 2019 11:04)      SUBJECTIVE / OVERNIGHT EVENTS:  Patient seen and examined this morning. No overnight events. States that she feels much better and asks if she can go home later on today. Denies any chest pain, shortness of breath, fevers, chills, nausea or vomiting.    MEDICATIONS  (STANDING):  docusate sodium 100 milliGRAM(s) Oral three times a day  enoxaparin Injectable 40 milliGRAM(s) SubCutaneous every 24 hours  fentaNYL   Patch  50 MICROgram(s)/Hr 1 Patch Transdermal every 72 hours  folic acid 1 milliGRAM(s) Oral daily  HYDROmorphone PCA (1 mG/mL) 30 milliLiter(s) PCA Continuous PCA Continuous  hydroxyurea 1000 milliGRAM(s) Oral two times a day  influenza   Vaccine 0.5 milliLiter(s) IntraMuscular once  senna 2 Tablet(s) Oral at bedtime  sodium chloride 0.45%. 1000 milliLiter(s) (120 mL/Hr) IV Continuous <Continuous>    MEDICATIONS  (PRN):  acetaminophen   Tablet .. 650 milliGRAM(s) Oral every 6 hours PRN Mild Pain (1 - 3), Moderate Pain (4 - 6)  naloxone Injectable 0.1 milliGRAM(s) IV Push every 3 minutes PRN For ANY of the following changes in patient status:  A. RR LESS THAN 10 breaths per minute, B. Oxygen saturation LESS THAN 90%, C. Sedation score of 6  ondansetron Injectable 4 milliGRAM(s) IV Push every 6 hours PRN Nausea      PHYSICAL EXAM:  T(C): 36.7 (03-17-19 @ 06:21), Max: 37 (03-16-19 @ 14:48)  HR: 59 (03-17-19 @ 06:21) (57 - 80)  BP: 101/61 (03-17-19 @ 06:21) (95/55 - 108/68)  RR: 18 (03-17-19 @ 06:21) (18 - 18)  SpO2: 100% (03-17-19 @ 06:21) (100% - 100%)  I&O's Summary    GENERAL: NAD, well-developed, pleasant  HEAD:  Atraumatic, Normocephalic  EYES: EOMI, PERRLA, conjunctiva and sclera clear  NECK: Supple, No elevated JVD  CHEST/LUNG: Clear to auscultation bilaterally; No wheeze  HEART: Regular rate and rhythm; No murmurs, rubs, or gallops  ABDOMEN: Soft, Nontender, Nondistended; Bowel sounds present  EXTREMITIES:  2+ Peripheral Pulses, No clubbing, cyanosis, or edema  PSYCH: AAOx3  NEUROLOGY: CN II-XII grossly intact, moving all extremities  SKIN: No rashes or lesions    LABS:  CAPILLARY BLOOD GLUCOSE                              7.4    2.96  )-----------( 141      ( 17 Mar 2019 08:57 )             22.2     03-17    139  |  106  |  6<L>  ----------------------------<  90  4.4   |  24  |  0.82    Ca    9.2      17 Mar 2019 08:57    TPro  8.0  /  Alb  4.4  /  TBili  1.7<H>  /  DBili  x   /  AST  30  /  ALT  9   /  AlkPhos  57  03-15    PT/INR - ( 15 Mar 2019 20:31 )   PT: 13.3 SEC;   INR: 1.16          PTT - ( 15 Mar 2019 20:31 )  PTT:34.8 SEC          RADIOLOGY & ADDITIONAL TESTS:    Imaging Personally Reviewed:    Consultant(s) Notes Reviewed:      Care Discussed with Consultants/Other Providers:

## 2021-07-21 NOTE — ED ADULT NURSE NOTE - PRIMARY CARE PROVIDER
Infectious Disease  Note        Reason: Right fifth toe infection    Current abx Prior abx   Cefepime since 7/19/2021      Lines:       Assessment :      82-year-old gentleman with a history of uncontrolled diabetes mellitus type 2 (last hgbA1C 8.3 on 7/19/21),  osteoarthritis of the knee, chronic kidney disease, hypertension who presented  to the emergency department for admission on 7/18/21 per request of podiatry. Wound culture 7/10- MSSA, klebsiella s/p ciprofloxacin, augmentin    Now with swelling right fifth toe, nonhealing ulcer, x-ray changes suggestive of osteomyelitis    Clinical presentation consistent with partially treated acute on chronic osteomyelitis right fifth toe. S/p right fifth toe amputation on 7/19/21. Podiatry follow-up appreciated. Intra-Op cultures 2 different types of gram-negative rods    Results of bone biopsy noted. Bone biopsy of clean margins negative for osteomyelitis    Recommendations:    1. Continue cefepime  2. F/u intra op cultures  3. We will switch to culture appropriate oral antibiotics in a.m. Above plan was discussed in details with patient, dr. Karo Arita. Please call me if any further questions or concerns. Will continue to participate in the care of this patient. HPI:    Feels fine. No new complaints      home Medication List    Details   celecoxib (CELEBREX PO) Take  by mouth. insulin lispro (HUMALOG) 100 unit/mL injection Use tid per sliding scale  Indications: type 2 diabetes mellitus  Qty: 3 Vial, Refills: 0    Comments: Pharmacy Assistance program      Syringe with Needle, Disp, (Allergy Syringe) 1 mL 27 x 1/2\" syrg FOR USE WITH INTRACAVERNOSAL INJECTIONS. Qty: 25 Pen Needle, Refills: 2      Injector Device rah DISPENSE INJECT-EASE AUTO-INJECTOR FOR USE WITH BI-MIX INJECTIONS. Qty: 1 Each, Refills: 1      insulin glargine (LANTUS) 100 unit/mL injection 25 Units by SubCUTAneous route nightly.  Indications: type 2 diabetes mellitus  Qty: 1 Vial, Refills: 0    Comments: Pharmacy Assistance Program medication  Associated Diagnoses: Type 2 diabetes mellitus with microalbuminuria, with long-term current use of insulin (HCC)          Current Facility-Administered Medications   Medication Dose Route Frequency    cefepime (MAXIPIME) 2 g in sterile water (preservative free) 10 mL IV syringe  2 g IntraVENous Q8H    docusate sodium (COLACE) capsule 100 mg  100 mg Oral BID    L. acidophilus,casei,rhamnosus (BIO-K PLUS) capsule 1 Capsule  1 Capsule Oral DAILY    HYDROcodone-acetaminophen (NORCO) 5-325 mg per tablet 1 Tablet  1 Tablet Oral Q4H PRN    celecoxib (CELEBREX) capsule 100 mg  100 mg Oral DAILY    insulin glargine (LANTUS) injection 25 Units  25 Units SubCUTAneous QHS    sodium chloride (NS) flush 5-40 mL  5-40 mL IntraVENous Q8H    sodium chloride (NS) flush 5-40 mL  5-40 mL IntraVENous PRN    acetaminophen (TYLENOL) tablet 650 mg  650 mg Oral Q6H PRN    Or    acetaminophen (TYLENOL) suppository 650 mg  650 mg Rectal Q6H PRN    polyethylene glycol (MIRALAX) packet 17 g  17 g Oral DAILY PRN    ondansetron (ZOFRAN ODT) tablet 4 mg  4 mg Oral Q8H PRN    Or    ondansetron (ZOFRAN) injection 4 mg  4 mg IntraVENous Q6H PRN    heparin (porcine) injection 5,000 Units  5,000 Units SubCUTAneous Q8H    insulin lispro (HUMALOG) injection   SubCUTAneous AC&HS    glucose chewable tablet 16 g  4 Tablet Oral PRN    dextrose (D50W) injection syrg 12.5-25 g  25-50 mL IntraVENous PRN    glucagon (GLUCAGEN) injection 1 mg  1 mg IntraMUSCular PRN       Allergies: Voltaren [diclofenac sodium] and Zosyn [piperacillin-tazobactam]    Temp (24hrs), Av.3 °F (36.8 °C), Min:98 °F (36.7 °C), Max:98.8 °F (37.1 °C)    Visit Vitals  BP (!) 175/78 (BP 1 Location: Left upper arm, BP Patient Position: Lying) Comment: Nurse notified.    Pulse 67   Temp 98.6 °F (37 °C)   Resp 16   Ht 6' 1\" (1.854 m)   Wt 103.4 kg (228 lb)   SpO2 96%   BMI 30.08 kg/m²       ROS: 12 point ROS obtained in details. Pertinent positives as mentioned in HPI,   otherwise negative    Physical Exam:    General:   awake alert and oriented   Skin:   no rashes or skin lesions noted on limited exam   HEENT:  Normocephalic, atraumatic, EOMI, no scleral icterus or pallor; no conjunctival hemmohage;  Neck supple, no bruits. Lymph Nodes:   no cervical, axillary or inguinal adenopathy   Lungs:   non-labored, bilaterally clear to aspiration- no crackles wheezes rales or rhonchi   Heart:  RRR, s1 and s2; no edema, + pedal pulses   Abdomen:  soft, non-distended, active bowel sounds, no hepatomegaly, no splenomegaly. Non-tender   Genitourinary:  deferred   Extremities:   no clubbing, cyanosis; decreased range of movement of right knee due to pain. No erythema/swelling right knee; muscle mass appropriate for age; swelling of the right fifth toe. Ulceration present on the right fifth toe. No erythema right fifth toe or right foot. Neurologic:  No gross focal sensory abnormalities; 5/5 muscle strength to upper and lower extremities. Speech appropirate. Cranial nerves intact   Psychiatric:   appropriate and interactive. Labs: Results:   Chemistry Recent Labs     07/20/21  0648 07/19/21  0342   * 189*    140   K 4.3 4.3    105   CO2 31 30   BUN 16 20*   CREA 1.06 1.09   CA 8.3* 8.3*   AGAP 2* 5   BUCR 15 18      CBC w/Diff Recent Labs     07/20/21  0648 07/19/21  0342 07/18/21  1130   WBC 8.5 7.4 9.7   RBC 3.89* 3.61* 3.89*   HGB 11.7* 10.7* 11.6*   HCT 34.6* 32.4* 35.2*    328 347   GRANS 66  --  69   LYMPH 23  --  21   EOS 3  --  3      Microbiology Recent Labs     07/19/21  1929   CULT PENDING          RADIOLOGY:    All available imaging studies/reports in [facility] for this admission were reviewed      Disclaimer: Sections of this note are dictated utilizing voice recognition software, which may have resulted in some phonetic based errors in grammar and contents.  Even though attempts were made to correct all the mistakes, some may have been missed, and remained in the body of the document. If questions arise, please contact our department.     Dr. Mickey Aldridge, Infectious Disease Specialist  [phone]  July 21, 2021  11:20 AM Dr. Salazar

## 2021-10-01 NOTE — DISCHARGE NOTE ADULT - CONTRAINDICATIONS & PRECAUTIONS (SELECT ALL THAT APPLY)
Implemented All Fall Risk Interventions:  [place] to call system. Call bell, personal items and telephone within reach. Instruct patient to call for assistance. Room bathroom lighting operational. Non-slip footwear when patient is off stretcher. Physically safe environment: no spills, clutter or unnecessary equipment. Stretcher in lowest position, wheels locked, appropriate side rails in place. Provide visual cue, wrist band, yellow gown, etc. Monitor gait and stability. Monitor for mental status changes and reorient to person, place, and time. Review medications for side effects contributing to fall risk. Reinforce activity limits and safety measures with patient and family.
Moderate to severe acute illness with or without fever. Delay administration of vaccination until patient has been afebrile or illness resolved for 24hrs

## 2021-10-15 NOTE — H&P ADULT - NEGATIVE GASTROINTESTINAL SYMPTOMS
Agree with above NP note.  CV stable  echo with normal lv fxn, mod AS  PPM interrogation with PAf  low dose BB as bp allows  no AC due to bleed history(pt s/p watchman)  cont asa 81mg daily   dvt ppx
Agree with above NP note.  CV stable  echo with normal lv fxn, mod AS  check orthostatics  PPM interrogation with PAf  low dose BB as bp allows  no AC due to bleed history(pt s/p watchman)  cont asa 81mg daily   dvt ppx
Patient seen and examined, agree with the above assessment and plan by FREDDIE Prado.  CV stable  No tele events  Followup echo(prior echo revealed mild AS)  check orthostatics  s/p PPM interrogation; nl fx; Multiple +PAF with RVR episodes recorded on 10/11; No correlated event recorded at time of syncope around 9am on 10/11  BP has improved plan to reintroduce low dose BB  no AC due to bleed history(pt s/p watchman)  cont asa 81mg daily   dvt ppx
no abdominal pain/no nausea/no change in bowel habits/no vomiting

## 2022-01-01 NOTE — PROVIDER CONTACT NOTE (CRITICAL VALUE NOTIFICATION) - TEST AND RESULT REPORTED:
Fady passed the  hearing screening in both ears today! No more follow-up is needed with Audiology unless any new concerns arise.             hg/hct 7.1/20.1

## 2022-01-01 NOTE — ED PROVIDER NOTE - NS ED ROS FT
[facility]  [address]  EXPLORER CLINIC  [address]  [facility] [address] [phone]      Cardiology Clinic   RN Care Coordinators, Anisa Wilson (Bre) or Anushka Tapia  [phone]  Pediatric Call Center/Scheduling  [phone]    After Hours and Emergency Contact Number  [phone]  * Ask for the pediatric cardiologist on call         Prescription Renewals  The pharmacy must fax requests to [phone]  * Please allow 3-4 days for prescriptions to be authorized     Your feedback is very important to us. If you receive a survey about your visit today, please take the time to fill this out so we can continue to improve.       REVIEW OF SYSTEMS:  General:  +subjective fevers, weight gain  HEENT: + headache per HPI,  no throat pain   Cardiac: no chest pain, no palpitations  Respiratory: no cough, no shortness of breath  Gastrointestinal: no abdominal pain, no nausea, no vomiting, no diarrhea, no melena, no hematochezia   Genitourinary: no dysuria  Extremities: no extremity swelling  Neuro: no focal weakness, no numbness/tingling of the extremities, no decreased sensation  Heme: +sickle cell disease  -Ashlee Ohara PGY-1

## 2022-05-16 NOTE — PATIENT PROFILE ADULT - NSPROGENANESREACTION_GEN_A_NUR
PROVIDER:[TOKEN:[[id number]],FOLLOWUP:[Routine]],PROVIDER:[TOKEN:[[id number]],FOLLOWUP:[1-3 Days]]
no previous reaction

## 2022-07-29 NOTE — DISCHARGE NOTE ADULT - PHYSICIAN SECTION COMPLETE
Attempted to call patient at [phone], phone number still not in service. Called patient's sister Dot, at [phone]. She states she has not seen or spoken to Terrance since our last phone conversation. Dot reports she recently saw patient's good friend, and friend reports he/she had not seen the patient recently and also does not know how to contact patient.     Dot confirmed that she still has [facility] phone number and will notify patient to contact clinic vs directing pt to nearest [facility] ED for care.     Ted Dyer MD  [facility], PGY-3   Yes

## 2022-09-16 NOTE — ED PROVIDER NOTE - OBJECTIVE STATEMENT
Called patient and scheduled new patient appointment. Date/Time/Location confirmed. Verbalized understanding.     Ashlee Ohara MD PGY-1 Pt is a 44 yo F PMH sickle cell disease (per documentation, has history of acute chest syndrome), hospitalized in Dec for pain crisis, presenting with HA, back pain and LE pain x 2 weeks, worse this AM. No neck pain/stiffness, no photophobia. States she takes MS-contin 60, morphine 30mg x 2. Recently ran out of fentanyl patch on Sunday, has been taking hydroxyurea and folic acid and hydrating. However, this morning HA was worse so came to ED. Denies chest pain, SOB, abd pain, recent illness. However, children had flu and bronchial infection. States normally morphine 8mg and benadryl help her pain. Cannot take dilaudid 2/2 worsening HA when she takes it. +subjective fevers. Denies cough but endorses spitting up white phlegm

## 2022-11-12 NOTE — ED PROVIDER NOTE - INPATIENT RESIDENT/ACP NOTIFIED
POC reviewed with pt.  - VSS on RA, AAOx4  - Midline incision with staples, CDI  - Pain controlled with PRNs  - no c/o nausea  - NGT not replaced per patient request MD is aware of this  - Ambulates independently   - Patient attempting to eat food multiple times patient was reminded he is NPO and family was also reminded over the phone. Patient a little more compliant  - Patient passing flatus  - Adequate urine output per urinal  - up to shower and walking in hallway x2 today  - accucheck q6hr, no coverage needed  - Call light in reach, [facility]   MAR

## 2023-09-12 NOTE — DISCHARGE NOTE PROVIDER - NSDCHC_MEDRECSTATUS_GEN_ALL_CORE
Admission Reconciliation is Completed  Discharge Reconciliation is Not Complete Admission Reconciliation is Completed  Discharge Reconciliation is Completed Xolair Pregnancy And Lactation Text: This medication is Pregnancy Category B and is considered safe during pregnancy. This medication is excreted in breast milk.

## 2023-10-09 NOTE — H&P ADULT - PROBLEM/PLAN-1
DISPLAY PLAN FREE TEXT Methotrexate Pregnancy And Lactation Text: This medication is Pregnancy Category X and is known to cause fetal harm. This medication is excreted in breast milk.

## 2023-10-09 NOTE — H&P ADULT - PSYCHIATRIC COMMENTS
Lab Results   Component Value Date    INR 2.61 (H) 10/09/2023    INR 1.69 (H) 10/05/2023    INR 1.60 (H) 10/02/2023    PROTIME 27.5 (H) 10/09/2023    PROTIME 19.8 (H) 10/05/2023    PROTIME 19.0 (H) 10/02/2023    Atrial fibrillation   Range 2.0-3.0  5/5/4  [facility] lab    Pt is aware of results and instructions.    affect and mood appropriate for situation anxiety and insomnia associated with current divorce proceedings

## 2024-08-21 NOTE — H&P ADULT - BREASTS

## 2024-09-02 ENCOUNTER — TRANSCRIPTION ENCOUNTER (OUTPATIENT)
Age: 49
End: 2024-09-02

## 2024-09-19 NOTE — PATIENT PROFILE ADULT - VISION (WITH CORRECTIVE LENSES IF THE PATIENT USUALLY WEARS THEM):
Prescription approved per [facility] Refill Protocol.  Yvonne Mosher RN  [facility]     Normal vision: sees adequately in most situations; can see medication labels, newsprint

## 2024-10-14 NOTE — H&P ADULT - NS ABD PE RECTAL EXAM
Anesthesia Post-op Note    Patient: Shen Martínez  Procedure(s) Performed: BILATERAL ADENOIDECTOMY (Bilateral: Throat)  Anesthesia type: General    Vitals Value Taken Time   Temp 36.8 °C (98.2 °F) 10/14/24 1514   Pulse 83 10/14/24 1429   Resp 26 10/14/24 1456   SpO2 100 % 10/14/24 1500   /52 10/14/24 1539         Patient Location: PACU Phase 1  Post-op Vital Signs:stable  Level of Consciousness: awake and alert  Respiratory Status: spontaneous ventilation  Cardiovascular stable  Hydration: euvolemic  Pain Management: adequately controlled  Handoff: Handoff to receiving clinician was performed and questions were answered  Vomiting: none  Nausea: None  Airway Patency:patent  Post-op Assessment: awake, alert, appropriately conversant, or baseline, no complications, patient tolerated procedure well, no evidence of recall, dentition, mouth, tongue, and oropharynx unchanged , moving all extremities and No Corneal Abrasion  Comments: Of note patient with emergence delirium upon emergence; VSS throughout; patient's mom at bediside, given medication, patient back to normal mental status after ~15-20mins      No notable events documented.                       not examined

## 2024-10-24 NOTE — ED ADULT NURSE NOTE - NS ED NURSE LEVEL OF CONSCIOUSNESS AFFECT
Diagnostic PSG completed per provider order.  Patient did not meet criteria for PAP therapy.    Calm/Appropriate

## 2024-12-03 NOTE — ED PROVIDER NOTE - RESPIRATORY, MLM
Called patient due to his report of dizziness. He states he feels better today. He is unsure if he had a stomach bug. Noticed that he had a bloody nose first thing in the morning which cleared up right away. He attributes this to the cold weather and dry indoor surroundings. He took a healthier form of tylenol last night and he feels better today.    His BP is 127/71,  HR WNL. States he saw a nutritionist recently and labs looked good.   Advised patient that if BP and HR are normal , unsure if this is cardiac related.   Patient then stated when he walks up a few stairs, he feels like he just ran- his heart is racing.  Advised I will review with  and get back to him.    Breath sounds clear and equal bilaterally. No Inc WOB.

## 2024-12-27 ENCOUNTER — EMERGENCY (EMERGENCY)
Facility: HOSPITAL | Age: 49
LOS: 1 days | Discharge: HOME | End: 2024-12-27
Attending: EMERGENCY MEDICINE | Admitting: EMERGENCY MEDICINE
Payer: MEDICAID

## 2024-12-27 VITALS
OXYGEN SATURATION: 95 % | DIASTOLIC BLOOD PRESSURE: 68 MMHG | RESPIRATION RATE: 18 BRPM | TEMPERATURE: 99 F | SYSTOLIC BLOOD PRESSURE: 105 MMHG | WEIGHT: 130.07 LBS | HEART RATE: 82 BPM

## 2024-12-27 DIAGNOSIS — Z98.890 OTHER SPECIFIED POSTPROCEDURAL STATES: Chronic | ICD-10-CM

## 2024-12-27 DIAGNOSIS — Z98.891 HISTORY OF UTERINE SCAR FROM PREVIOUS SURGERY: Chronic | ICD-10-CM

## 2024-12-27 LAB
ALBUMIN SERPL ELPH-MCNC: 4.5 G/DL — SIGNIFICANT CHANGE UP (ref 3.3–5)
ALP SERPL-CCNC: 64 U/L — SIGNIFICANT CHANGE UP (ref 40–120)
ALT FLD-CCNC: 11 U/L — SIGNIFICANT CHANGE UP (ref 4–33)
ANION GAP SERPL CALC-SCNC: 10 MMOL/L — SIGNIFICANT CHANGE UP (ref 7–14)
AST SERPL-CCNC: 44 U/L — HIGH (ref 4–32)
BASOPHILS # BLD AUTO: 0.06 K/UL — SIGNIFICANT CHANGE UP (ref 0–0.2)
BASOPHILS NFR BLD AUTO: 0.8 % — SIGNIFICANT CHANGE UP (ref 0–2)
BILIRUB SERPL-MCNC: 2.2 MG/DL — HIGH (ref 0.2–1.2)
BUN SERPL-MCNC: 9 MG/DL — SIGNIFICANT CHANGE UP (ref 7–23)
CALCIUM SERPL-MCNC: 9.8 MG/DL — SIGNIFICANT CHANGE UP (ref 8.4–10.5)
CHLORIDE SERPL-SCNC: 101 MMOL/L — SIGNIFICANT CHANGE UP (ref 98–107)
CO2 SERPL-SCNC: 26 MMOL/L — SIGNIFICANT CHANGE UP (ref 22–31)
CREAT SERPL-MCNC: 0.7 MG/DL — SIGNIFICANT CHANGE UP (ref 0.5–1.3)
EGFR: 107 ML/MIN/1.73M2 — SIGNIFICANT CHANGE UP
EOSINOPHIL # BLD AUTO: 0.19 K/UL — SIGNIFICANT CHANGE UP (ref 0–0.5)
EOSINOPHIL NFR BLD AUTO: 2.7 % — SIGNIFICANT CHANGE UP (ref 0–6)
GLUCOSE SERPL-MCNC: 102 MG/DL — HIGH (ref 70–99)
HCG SERPL-ACNC: <1 MIU/ML — SIGNIFICANT CHANGE UP
HCT VFR BLD CALC: 23.5 % — LOW (ref 34.5–45)
HGB BLD-MCNC: 8.1 G/DL — LOW (ref 11.5–15.5)
IANC: 3.75 K/UL — SIGNIFICANT CHANGE UP (ref 1.8–7.4)
IMM GRANULOCYTES NFR BLD AUTO: 0.3 % — SIGNIFICANT CHANGE UP (ref 0–0.9)
LYMPHOCYTES # BLD AUTO: 2.48 K/UL — SIGNIFICANT CHANGE UP (ref 1–3.3)
LYMPHOCYTES # BLD AUTO: 34.6 % — SIGNIFICANT CHANGE UP (ref 13–44)
MCHC RBC-ENTMCNC: 27.6 PG — SIGNIFICANT CHANGE UP (ref 27–34)
MCHC RBC-ENTMCNC: 34.5 G/DL — SIGNIFICANT CHANGE UP (ref 32–36)
MCV RBC AUTO: 79.9 FL — LOW (ref 80–100)
MONOCYTES # BLD AUTO: 0.66 K/UL — SIGNIFICANT CHANGE UP (ref 0–0.9)
MONOCYTES NFR BLD AUTO: 9.2 % — SIGNIFICANT CHANGE UP (ref 2–14)
NEUTROPHILS # BLD AUTO: 3.75 K/UL — SIGNIFICANT CHANGE UP (ref 1.8–7.4)
NEUTROPHILS NFR BLD AUTO: 52.4 % — SIGNIFICANT CHANGE UP (ref 43–77)
NRBC # BLD: 0 /100 WBCS — SIGNIFICANT CHANGE UP (ref 0–0)
NRBC # FLD: 0.06 K/UL — HIGH (ref 0–0)
PLATELET # BLD AUTO: 279 K/UL — SIGNIFICANT CHANGE UP (ref 150–400)
POTASSIUM SERPL-MCNC: 4.4 MMOL/L — SIGNIFICANT CHANGE UP (ref 3.5–5.3)
POTASSIUM SERPL-SCNC: 4.4 MMOL/L — SIGNIFICANT CHANGE UP (ref 3.5–5.3)
PROT SERPL-MCNC: 8.7 G/DL — HIGH (ref 6–8.3)
RBC # BLD: 2.94 M/UL — LOW (ref 3.8–5.2)
RBC # BLD: 2.94 M/UL — LOW (ref 3.8–5.2)
RBC # FLD: 13.6 % — SIGNIFICANT CHANGE UP (ref 10.3–14.5)
RETICS #: 120 K/UL — SIGNIFICANT CHANGE UP (ref 25–125)
RETICS/RBC NFR: 4.1 % — HIGH (ref 0.5–2.5)
SODIUM SERPL-SCNC: 137 MMOL/L — SIGNIFICANT CHANGE UP (ref 135–145)
WBC # BLD: 7.16 K/UL — SIGNIFICANT CHANGE UP (ref 3.8–10.5)
WBC # FLD AUTO: 7.16 K/UL — SIGNIFICANT CHANGE UP (ref 3.8–10.5)

## 2024-12-27 PROCEDURE — 99285 EMERGENCY DEPT VISIT HI MDM: CPT

## 2024-12-27 PROCEDURE — 73502 X-RAY EXAM HIP UNI 2-3 VIEWS: CPT | Mod: 26,RT

## 2024-12-27 PROCEDURE — 93010 ELECTROCARDIOGRAM REPORT: CPT

## 2024-12-27 PROCEDURE — 71046 X-RAY EXAM CHEST 2 VIEWS: CPT | Mod: 26

## 2024-12-27 RX ORDER — HYDROMORPHONE HYDROCHLORIDE 2 MG/1
2 TABLET ORAL ONCE
Refills: 0 | Status: DISCONTINUED | OUTPATIENT
Start: 2024-12-27 | End: 2024-12-27

## 2024-12-27 RX ORDER — KETOROLAC TROMETHAMINE 30 MG/ML
15 INJECTION INTRAMUSCULAR; INTRAVENOUS ONCE
Refills: 0 | Status: DISCONTINUED | OUTPATIENT
Start: 2024-12-27 | End: 2024-12-27

## 2024-12-27 RX ORDER — SODIUM CHLORIDE 9 MG/ML
1000 INJECTION, SOLUTION INTRAMUSCULAR; INTRAVENOUS; SUBCUTANEOUS ONCE
Refills: 0 | Status: COMPLETED | OUTPATIENT
Start: 2024-12-27 | End: 2024-12-27

## 2024-12-27 RX ORDER — DIPHENHYDRAMINE HCL 25 MG
50 CAPSULE ORAL ONCE
Refills: 0 | Status: COMPLETED | OUTPATIENT
Start: 2024-12-27 | End: 2024-12-27

## 2024-12-27 RX ADMIN — Medication 50 MILLIGRAM(S): at 22:18

## 2024-12-27 RX ADMIN — HYDROMORPHONE HYDROCHLORIDE 2 MILLIGRAM(S): 2 TABLET ORAL at 23:14

## 2024-12-27 RX ADMIN — HYDROMORPHONE HYDROCHLORIDE 2 MILLIGRAM(S): 2 TABLET ORAL at 22:17

## 2024-12-27 RX ADMIN — KETOROLAC TROMETHAMINE 15 MILLIGRAM(S): 30 INJECTION INTRAMUSCULAR; INTRAVENOUS at 22:17

## 2024-12-27 RX ADMIN — SODIUM CHLORIDE 1000 MILLILITER(S): 9 INJECTION, SOLUTION INTRAMUSCULAR; INTRAVENOUS; SUBCUTANEOUS at 22:17

## 2024-12-27 NOTE — ED ADULT NURSE NOTE - OBJECTIVE STATEMENT
Yobani RN - Pt received to 5a   , awake and alert, A&OX4, ambulatory. C/o SCC. pt sates that she hasn't had a flare up for the past two years however she ran out of her meds and was unable to get them refilled and the pain came back, pt endorsing the pain to her back and legs. 10/10. pt denies any fever or chills.    Respirations even and unlabored. Denies CP, SOB, N/V, HA, dizziness, palpitations, blurry vision. 20G IV placed to  left AC   . Bed in lowest position, call bell within reach. Safety maintained.

## 2024-12-27 NOTE — ED ADULT TRIAGE NOTE - CHIEF COMPLAINT QUOTE
Patient complains of pain to b/l hips, ankles and chest pain, admits to taking Morphine 30mg at 2pm. Patient denies any SOB, respirations equal and unlabored on room air. pmhx: sickle cell disease

## 2024-12-28 VITALS
DIASTOLIC BLOOD PRESSURE: 48 MMHG | TEMPERATURE: 98 F | RESPIRATION RATE: 17 BRPM | OXYGEN SATURATION: 99 % | HEART RATE: 66 BPM | SYSTOLIC BLOOD PRESSURE: 93 MMHG

## 2024-12-28 LAB
BLD GP AB SCN SERPL QL: POSITIVE — SIGNIFICANT CHANGE UP
RH IG SCN BLD-IMP: POSITIVE — SIGNIFICANT CHANGE UP

## 2024-12-28 PROCEDURE — 86077 PHYS BLOOD BANK SERV XMATCH: CPT

## 2024-12-28 RX ORDER — MORPHINE SULFATE 15 MG
2 TABLET, EXTENDED RELEASE ORAL
Qty: 12 | Refills: 0
Start: 2024-12-28 | End: 2025-01-05

## 2024-12-28 RX ORDER — HYDROMORPHONE HYDROCHLORIDE 2 MG/1
2 TABLET ORAL ONCE
Refills: 0 | Status: DISCONTINUED | OUTPATIENT
Start: 2024-12-28 | End: 2024-12-28

## 2024-12-28 RX ORDER — MORPHINE SULFATE 15 MG
2 TABLET, EXTENDED RELEASE ORAL
Qty: 12 | Refills: 0 | DISCHARGE
Start: 2024-12-28 | End: 2024-12-30

## 2024-12-28 RX ADMIN — HYDROMORPHONE HYDROCHLORIDE 2 MILLIGRAM(S): 2 TABLET ORAL at 01:03

## 2024-12-28 NOTE — ED POST DISCHARGE NOTE - REASON FOR FOLLOW-UP
Received call from patient stating [facility] does not have morphine 30mg tablets - spoke with pharmacist and confirmed Rx was not filled. Rx switched to [facility] and resent Other

## 2024-12-28 NOTE — ED PROVIDER NOTE - PATIENT PORTAL LINK FT
You can access the FollowMyHealth Patient Portal offered by [facility] by registering at the following website: [email or web address]. By joining [facility]’s FollowMyHealth portal, you will also be able to view your health information using other applications (apps) compatible with our system.

## 2024-12-28 NOTE — ED PROVIDER NOTE - NSFOLLOWUPCLINICS_GEN_ALL_ED_FT
[facility]  Hematology/Oncology  [address] [id number]  Phone: [phone]  Fax:   Follow Up Time: 1-3 Days

## 2024-12-28 NOTE — ED PROVIDER NOTE - CLINICAL SUMMARY MEDICAL DECISION MAKING FREE TEXT BOX
Juan Carlos Gamino MD, PGY3  49-year-old female with past medical history of sickle cell disease presenting to emergency department with back pain, right hip pain, lower extremity pain that started yesterday.  Feels like prior sickle cell pain crises.  Went to doctor today but doctor was on vacation and was told she would need to go to emergency department if she wanted pain medications.  No history of acute chest or avascular necrosis before in the past.  Took morphine 30 mg at home with no relief of symptoms.  States she had some chest pain at onset of symptoms that is since resolved.  Denies any recent falls or injuries.  Denies fever, headache, vision change, abdominal pain, vomiting, diarrhea, dysuria, extremity edema, rash.    Gen: No acute distress  HEENT: EOMI, no nasal discharge, mucous membranes moist  CV: RRR, +S1/S2, no M/R/G, 2+ radial pulses b/l  Resp: CTAB, no W/R/R, no accessory muscle use, no increased work of breathing  GI: Abdomen soft non-distended, NTTP  MSK: Generalized ttp over Rt hip and b/l lower extremities. No overlying skin changes. No open wounds, no bruising, no LE edema  Neuro: A&Ox4, following commands, moving all four extremities spontaneously  Psych: appropriate mood    In emergency department patient hemodynamically stable, afebrile.  Patient well-appearing in no acute distress.  Exam notable for tenderness to palpation over right hip, generalized tenderness over bilateral lower extremities.  Rest of exam unremarkable and as noted above.  Differential including but not limited to sickle cell pain crisis, avascular necrosis, musculoskeletal injury.  Plan to obtain labs, chest x-ray, x-ray of pelvis and right hip.  Will treat symptomatically with Dilaudid, Toradol, IV fluids.  Will reassess.  Disposition pending workup and patient response to interventions.

## 2024-12-28 NOTE — ED PROVIDER NOTE - PROGRESS NOTE DETAILS
Blanca, PGY3: Patient signed out to me by day team.    Fransisco - 49yF [5A]  SCD  pain crises, back/R hip/b/l LE  s/p morphine 30 (home), dilaudid 2mg x 2  [ ] xray, dilaudid > dc vs TBA    Patient was reviewed 3 doses of Dilaudid 2 mg IV and reports improvement in headaches and nauseousness, but still reports slight amount of knee pain.  Patient feels ready to go home and will follow-up with her oncologist (Dr. Weathers).  Confirmed that patient has received morphine 30 mg every 6 hours in the past so we will prescribe this medication for 3 days until she follows up with her oncologist.  Labs remarkable for hemoglobin 8.1, reticulocyte percentage 4.1%, T bilirubin 2.2.  No focal consolidations on x-ray or any lesions on x-ray of hip/pelvis.  Patient does not complain of any chest pain or difficulty breathing at this time.  Plan for discharge home.

## 2024-12-28 NOTE — ED PROVIDER NOTE - ATTENDING CONTRIBUTION TO CARE
Patient PTED with HBSS c/b acute chest with crisis = prior crisis  = labs reticu count analgesics x 3 rounds nad reassess  Probable d/c but await response to meds

## 2024-12-28 NOTE — ED PROVIDER NOTE - NSFOLLOWUPINSTRUCTIONS_ED_ALL_ED_FT
Reason for ED Visit:  - Diffuse body pain consistent with sickle cell pain crisis    Findings/Diagnosis:  - Sicke cell pain crisis required Dilaudid 2 mg IV x 3 with improvement in symptoms  - No complaints of chest pain or difficulty breathing  - No evidence of pneumonia or fractures on xrays    Please return to the ED immediately for any new, worsening, or concerning symptoms including, but not limited to:   - Chest pain or difficulty breathing   - Worsening pain despite taking morphine on home dose    Please take the following medications at home:   - Acetaminophen 500-1000 mg every 6 hours as needed for pain   - Morphine 30 mg every 6 hours as needed for severe pain (home medication)    Please follow up with your hematologist/oncologist regarding this ED visit.    Thank you for choosing us for your care.

## 2025-01-01 ENCOUNTER — INPATIENT (INPATIENT)
Facility: HOSPITAL | Age: 50
LOS: 1 days | Discharge: HOME | End: 2025-01-03
Attending: HOSPITALIST | Admitting: HOSPITALIST
Payer: MEDICAID

## 2025-01-01 VITALS
WEIGHT: 130.07 LBS | OXYGEN SATURATION: 98 % | DIASTOLIC BLOOD PRESSURE: 61 MMHG | RESPIRATION RATE: 16 BRPM | HEIGHT: 67 IN | HEART RATE: 75 BPM | SYSTOLIC BLOOD PRESSURE: 100 MMHG | TEMPERATURE: 99 F

## 2025-01-01 DIAGNOSIS — Z29.9 ENCOUNTER FOR PROPHYLACTIC MEASURES, UNSPECIFIED: ICD-10-CM

## 2025-01-01 DIAGNOSIS — Z98.890 OTHER SPECIFIED POSTPROCEDURAL STATES: Chronic | ICD-10-CM

## 2025-01-01 DIAGNOSIS — Z98.891 HISTORY OF UTERINE SCAR FROM PREVIOUS SURGERY: Chronic | ICD-10-CM

## 2025-01-01 DIAGNOSIS — D57.00 HB-SS DISEASE WITH CRISIS, UNSPECIFIED: ICD-10-CM

## 2025-01-01 DIAGNOSIS — K59.00 CONSTIPATION, UNSPECIFIED: ICD-10-CM

## 2025-01-01 LAB
ALBUMIN SERPL ELPH-MCNC: 4.2 G/DL — SIGNIFICANT CHANGE UP (ref 3.3–5)
ALP SERPL-CCNC: 55 U/L — SIGNIFICANT CHANGE UP (ref 40–120)
ALT FLD-CCNC: 10 U/L — SIGNIFICANT CHANGE UP (ref 4–33)
ANION GAP SERPL CALC-SCNC: 12 MMOL/L — SIGNIFICANT CHANGE UP (ref 7–14)
AST SERPL-CCNC: 46 U/L — HIGH (ref 4–32)
BASOPHILS # BLD AUTO: 0.07 K/UL — SIGNIFICANT CHANGE UP (ref 0–0.2)
BASOPHILS NFR BLD AUTO: 1.3 % — SIGNIFICANT CHANGE UP (ref 0–2)
BILIRUB SERPL-MCNC: 1.9 MG/DL — HIGH (ref 0.2–1.2)
BUN SERPL-MCNC: 16 MG/DL — SIGNIFICANT CHANGE UP (ref 7–23)
CALCIUM SERPL-MCNC: 9.5 MG/DL — SIGNIFICANT CHANGE UP (ref 8.4–10.5)
CHLORIDE SERPL-SCNC: 105 MMOL/L — SIGNIFICANT CHANGE UP (ref 98–107)
CO2 SERPL-SCNC: 22 MMOL/L — SIGNIFICANT CHANGE UP (ref 22–31)
CREAT SERPL-MCNC: 0.88 MG/DL — SIGNIFICANT CHANGE UP (ref 0.5–1.3)
EGFR: 82 ML/MIN/1.73M2 — SIGNIFICANT CHANGE UP
EOSINOPHIL # BLD AUTO: 0.27 K/UL — SIGNIFICANT CHANGE UP (ref 0–0.5)
EOSINOPHIL NFR BLD AUTO: 5 % — SIGNIFICANT CHANGE UP (ref 0–6)
FLUAV AG NPH QL: SIGNIFICANT CHANGE UP
FLUBV AG NPH QL: SIGNIFICANT CHANGE UP
GLUCOSE SERPL-MCNC: 117 MG/DL — HIGH (ref 70–99)
HCG SERPL-ACNC: <1 MIU/ML — SIGNIFICANT CHANGE UP
HCT VFR BLD CALC: 22 % — LOW (ref 34.5–45)
HGB BLD-MCNC: 7.6 G/DL — LOW (ref 11.5–15.5)
IANC: 2.14 K/UL — SIGNIFICANT CHANGE UP (ref 1.8–7.4)
IMM GRANULOCYTES NFR BLD AUTO: 0.4 % — SIGNIFICANT CHANGE UP (ref 0–0.9)
LYMPHOCYTES # BLD AUTO: 1.96 K/UL — SIGNIFICANT CHANGE UP (ref 1–3.3)
LYMPHOCYTES # BLD AUTO: 36.4 % — SIGNIFICANT CHANGE UP (ref 13–44)
MCHC RBC-ENTMCNC: 28.1 PG — SIGNIFICANT CHANGE UP (ref 27–34)
MCHC RBC-ENTMCNC: 34.5 G/DL — SIGNIFICANT CHANGE UP (ref 32–36)
MCV RBC AUTO: 81.5 FL — SIGNIFICANT CHANGE UP (ref 80–100)
MONOCYTES # BLD AUTO: 0.92 K/UL — HIGH (ref 0–0.9)
MONOCYTES NFR BLD AUTO: 17.1 % — HIGH (ref 2–14)
NEUTROPHILS # BLD AUTO: 2.14 K/UL — SIGNIFICANT CHANGE UP (ref 1.8–7.4)
NEUTROPHILS NFR BLD AUTO: 39.8 % — LOW (ref 43–77)
NRBC # BLD: 0 /100 WBCS — SIGNIFICANT CHANGE UP (ref 0–0)
NRBC # FLD: 0.04 K/UL — HIGH (ref 0–0)
PLATELET # BLD AUTO: 239 K/UL — SIGNIFICANT CHANGE UP (ref 150–400)
POTASSIUM SERPL-MCNC: 4.6 MMOL/L — SIGNIFICANT CHANGE UP (ref 3.5–5.3)
POTASSIUM SERPL-SCNC: 4.6 MMOL/L — SIGNIFICANT CHANGE UP (ref 3.5–5.3)
PROT SERPL-MCNC: 8.1 G/DL — SIGNIFICANT CHANGE UP (ref 6–8.3)
RBC # BLD: 2.7 M/UL — LOW (ref 3.8–5.2)
RBC # BLD: 2.7 M/UL — LOW (ref 3.8–5.2)
RBC # FLD: 13.2 % — SIGNIFICANT CHANGE UP (ref 10.3–14.5)
RETICS #: 86.4 K/UL — SIGNIFICANT CHANGE UP (ref 25–125)
RETICS/RBC NFR: 3.2 % — HIGH (ref 0.5–2.5)
RSV RNA NPH QL NAA+NON-PROBE: SIGNIFICANT CHANGE UP
SARS-COV-2 RNA SPEC QL NAA+PROBE: SIGNIFICANT CHANGE UP
SODIUM SERPL-SCNC: 139 MMOL/L — SIGNIFICANT CHANGE UP (ref 135–145)
WBC # BLD: 5.38 K/UL — SIGNIFICANT CHANGE UP (ref 3.8–10.5)
WBC # FLD AUTO: 5.38 K/UL — SIGNIFICANT CHANGE UP (ref 3.8–10.5)

## 2025-01-01 PROCEDURE — 99223 1ST HOSP IP/OBS HIGH 75: CPT

## 2025-01-01 PROCEDURE — 71045 X-RAY EXAM CHEST 1 VIEW: CPT | Mod: 26

## 2025-01-01 PROCEDURE — 99285 EMERGENCY DEPT VISIT HI MDM: CPT

## 2025-01-01 RX ORDER — ONDANSETRON 4 MG/1
4 TABLET ORAL EVERY 8 HOURS
Refills: 0 | Status: DISCONTINUED | OUTPATIENT
Start: 2025-01-01 | End: 2025-01-03

## 2025-01-01 RX ORDER — POLYETHYLENE GLYCOL 3350 17 G/DOSE
17 POWDER (GRAM) ORAL DAILY
Refills: 0 | Status: DISCONTINUED | OUTPATIENT
Start: 2025-01-01 | End: 2025-01-01

## 2025-01-01 RX ORDER — DIPHENHYDRAMINE HCL 25 MG
25 TABLET ORAL ONCE
Refills: 0 | Status: COMPLETED | OUTPATIENT
Start: 2025-01-01 | End: 2025-01-01

## 2025-01-01 RX ORDER — DIPHENHYDRAMINE HCL 25 MG
50 TABLET ORAL EVERY 6 HOURS
Refills: 0 | Status: DISCONTINUED | OUTPATIENT
Start: 2025-01-01 | End: 2025-01-03

## 2025-01-01 RX ORDER — HYDROMORPHONE HCL 4 MG
2 TABLET ORAL EVERY 4 HOURS
Refills: 0 | Status: DISCONTINUED | OUTPATIENT
Start: 2025-01-01 | End: 2025-01-01

## 2025-01-01 RX ORDER — INFLUENZA A VIRUS A/WISCONSIN/588/2019 (H1N1) RECOMBINANT HEMAGGLUTININ ANTIGEN, INFLUENZA A VIRUS A/DARWIN/6/2021 (H3N2) RECOMBINANT HEMAGGLUTININ ANTIGEN, INFLUENZA B VIRUS B/AUSTRIA/1359417/2021 RECOMBINANT HEMAGGLUTININ ANTIGEN, AND INFLUENZA B VIRUS B/PHUKET/3073/2013 RECOMBINANT HEMAGGLUTININ ANTIGEN 45; 45; 45; 45 UG/.5ML; UG/.5ML; UG/.5ML; UG/.5ML
0.5 INJECTION INTRAMUSCULAR ONCE
Refills: 0 | Status: DISCONTINUED | OUTPATIENT
Start: 2025-01-01 | End: 2025-01-03

## 2025-01-01 RX ORDER — NALOXONE HCL 0.4 MG/ML
0.1 VIAL (ML) INJECTION
Refills: 0 | Status: DISCONTINUED | OUTPATIENT
Start: 2025-01-01 | End: 2025-01-03

## 2025-01-01 RX ORDER — HYDROMORPHONE HCL 4 MG
1 TABLET ORAL ONCE
Refills: 0 | Status: DISCONTINUED | OUTPATIENT
Start: 2025-01-01 | End: 2025-01-01

## 2025-01-01 RX ORDER — HYDROMORPHONE HCL 4 MG
30 TABLET ORAL
Refills: 0 | Status: DISCONTINUED | OUTPATIENT
Start: 2025-01-01 | End: 2025-01-03

## 2025-01-01 RX ORDER — HYDROMORPHONE HCL 4 MG
2 TABLET ORAL ONCE
Refills: 0 | Status: DISCONTINUED | OUTPATIENT
Start: 2025-01-01 | End: 2025-01-01

## 2025-01-01 RX ORDER — SODIUM CHLORIDE 9 MG/ML
1000 INJECTION, SOLUTION INTRAVENOUS
Refills: 0 | Status: DISCONTINUED | OUTPATIENT
Start: 2025-01-01 | End: 2025-01-03

## 2025-01-01 RX ORDER — KETOROLAC TROMETHAMINE 30 MG/ML
15 INJECTION INTRAMUSCULAR; INTRAVENOUS ONCE
Refills: 0 | Status: DISCONTINUED | OUTPATIENT
Start: 2025-01-01 | End: 2025-01-01

## 2025-01-01 RX ORDER — VITAMIN A 10000 UNIT
1 TABLET ORAL DAILY
Refills: 0 | Status: DISCONTINUED | OUTPATIENT
Start: 2025-01-01 | End: 2025-01-03

## 2025-01-01 RX ORDER — SODIUM CHLORIDE 9 MG/ML
1000 INJECTION, SOLUTION INTRAMUSCULAR; INTRAVENOUS; SUBCUTANEOUS ONCE
Refills: 0 | Status: COMPLETED | OUTPATIENT
Start: 2025-01-01 | End: 2025-01-01

## 2025-01-01 RX ORDER — MORPHINE SULFATE 15 MG
60 TABLET, EXTENDED RELEASE ORAL
Refills: 0 | Status: DISCONTINUED | OUTPATIENT
Start: 2025-01-01 | End: 2025-01-02

## 2025-01-01 RX ORDER — GINKGO BILOBA 40 MG
3 CAPSULE ORAL AT BEDTIME
Refills: 0 | Status: DISCONTINUED | OUTPATIENT
Start: 2025-01-01 | End: 2025-01-03

## 2025-01-01 RX ORDER — SODIUM CHLORIDE 9 MG/ML
1000 INJECTION, SOLUTION INTRAVENOUS ONCE
Refills: 0 | Status: COMPLETED | OUTPATIENT
Start: 2025-01-01 | End: 2025-01-01

## 2025-01-01 RX ORDER — ENOXAPARIN SODIUM 60 MG/.6ML
40 INJECTION INTRAVENOUS; SUBCUTANEOUS EVERY 24 HOURS
Refills: 0 | Status: DISCONTINUED | OUTPATIENT
Start: 2025-01-01 | End: 2025-01-03

## 2025-01-01 RX ORDER — SENNOSIDES 8.6 MG/1
2 TABLET, FILM COATED ORAL AT BEDTIME
Refills: 0 | Status: DISCONTINUED | OUTPATIENT
Start: 2025-01-01 | End: 2025-01-03

## 2025-01-01 RX ORDER — HYDROXYUREA 500 MG/1
500 CAPSULE ORAL THREE TIMES A DAY
Refills: 0 | Status: DISCONTINUED | OUTPATIENT
Start: 2025-01-01 | End: 2025-01-03

## 2025-01-01 RX ORDER — POLYETHYLENE GLYCOL 3350 17 G/DOSE
17 POWDER (GRAM) ORAL
Refills: 0 | Status: DISCONTINUED | OUTPATIENT
Start: 2025-01-01 | End: 2025-01-03

## 2025-01-01 RX ADMIN — Medication 2 MILLIGRAM(S): at 09:28

## 2025-01-01 RX ADMIN — Medication 2 MILLIGRAM(S): at 09:24

## 2025-01-01 RX ADMIN — Medication 1 MILLIGRAM(S): at 14:14

## 2025-01-01 RX ADMIN — SENNOSIDES 2 TABLET(S): 8.6 TABLET, FILM COATED ORAL at 22:12

## 2025-01-01 RX ADMIN — SODIUM CHLORIDE 1000 MILLILITER(S): 9 INJECTION, SOLUTION INTRAMUSCULAR; INTRAVENOUS; SUBCUTANEOUS at 14:16

## 2025-01-01 RX ADMIN — Medication 30 MILLILITER(S): at 20:25

## 2025-01-01 RX ADMIN — Medication 1 MILLIGRAM(S): at 15:36

## 2025-01-01 RX ADMIN — KETOROLAC TROMETHAMINE 15 MILLIGRAM(S): 30 INJECTION INTRAMUSCULAR; INTRAVENOUS at 11:13

## 2025-01-01 RX ADMIN — Medication 25 MILLIGRAM(S): at 08:46

## 2025-01-01 RX ADMIN — Medication 30 MILLILITER(S): at 18:26

## 2025-01-01 RX ADMIN — Medication 2 MILLIGRAM(S): at 11:13

## 2025-01-01 RX ADMIN — Medication 2 MILLIGRAM(S): at 08:06

## 2025-01-01 RX ADMIN — Medication 25 MILLIGRAM(S): at 07:59

## 2025-01-01 RX ADMIN — Medication 30 MILLILITER(S): at 15:13

## 2025-01-01 RX ADMIN — SODIUM CHLORIDE 100 MILLILITER(S): 9 INJECTION, SOLUTION INTRAVENOUS at 15:13

## 2025-01-01 RX ADMIN — Medication 60 MILLIGRAM(S): at 19:58

## 2025-01-01 RX ADMIN — SODIUM CHLORIDE 1000 MILLILITER(S): 9 INJECTION, SOLUTION INTRAVENOUS at 08:08

## 2025-01-01 RX ADMIN — Medication 1 MILLIGRAM(S): at 12:55

## 2025-01-01 RX ADMIN — Medication 17 GRAM(S): at 18:21

## 2025-01-01 NOTE — ED PROVIDER NOTE - PROGRESS NOTE DETAILS
Raulito Espinoza MD:  Pt pain re-assessed, requiring repeat dosing will trial 3rd dose parenteral opioids, if persistent pain will admit.

## 2025-01-01 NOTE — H&P ADULT - NSHPPHYSICALEXAM_GEN_ALL_CORE
PHYSICAL EXAM:  GENERAL: In mild distress 2/2 pain, well-developed  HEAD:  Atraumatic, Normocephalic  EYES: EOMI, PERRLA, conjunctiva and sclera clear  NECK: Supple, No JVD  CHEST/LUNG: Clear to auscultation bilaterally; No wheeze  HEART: Regular rate and rhythm; No murmurs, rubs, or gallops  ABDOMEN: Soft, Nontender, Nondistended; Bowel sounds present  EXTREMITIES:  2+ Peripheral Pulses, No clubbing, cyanosis, or edema  PSYCH: AAOx3  NEUROLOGY: non-focal  SKIN: No rashes or lesions PHYSICAL EXAM:  GENERAL: In mild distress 2/2 pain, well-developed  HEAD:  Atraumatic, Normocephalic  EYES: Anicteric, conjunctiva and sclera clear  NECK: Supple, No JVD  CHEST/LUNG: Clear to auscultation bilaterally; No wheeze, rhonchi, rales  HEART: Regular rate and rhythm; No murmurs, rubs, or gallops  ABDOMEN: Soft, Nontender, Nondistended; Bowel sounds present  EXTREMITIES:  2+ Peripheral Pulses, No clubbing, cyanosis, or edema  PSYCH: AAOx3, pleasant, cooperative  NEUROLOGY: non-focal  SKIN: No rashes or lesions, no jaundice

## 2025-01-01 NOTE — ED ADULT NURSE REASSESSMENT NOTE - NS ED NURSE REASSESS COMMENT FT1
Pt c/o localized itching on L hand.  made aware, Medication administered as per order. Pt denies any SOB, hives, facial or lip swelling. No swelling, redness or hives noted. No acute distress noted.

## 2025-01-01 NOTE — H&P ADULT - PROBLEM SELECTOR PLAN 1
Patient complains of pain in her LE b/L, which is similar in nature to prior sickle cell pain crises. No CP or SOB or recent illness. Patient feels current crisis is related to stress and running out of medications  -Start IV Dilaudid PCA when it arrives (0.4mg IV demand dose, 6 minute lockout, 4hr limit of 6mg)  -PRN Narcan  -Continue home MSContin ER 60mg po BID  -Bowel regimen  -Continue home Hydrea 500mg po TID  -Continue home Folic Acid 1mg daily  -BP borderline, give 1L NS and then 1/2NS at 100cc/hr for hydration  -Incentive spirometry  -LE dopplers given complaints of leg and groin pain and known hx of HbSS

## 2025-01-01 NOTE — ED PROVIDER NOTE - CLINICAL SUMMARY MEDICAL DECISION MAKING FREE TEXT BOX
49 Y F presenting with sickle cell crisis pain, not high risk for acute chest, will evaluate BW, treat symptomatically, re-assess after x3 trials of parenteral opioids.

## 2025-01-01 NOTE — ED PROVIDER NOTE - OBJECTIVE STATEMENT
49 Y F H/O SSD presenting with B/L LE leg pain, consistent with prior episodes of pain crisis, including lower back, denies any cp, sob, difficulty breathing, abdominal pain, nausea, vomiting, inability to ambulate.

## 2025-01-01 NOTE — ED ADULT NURSE NOTE - NSFALLUNIVINTERV_ED_ALL_ED
Bed/Stretcher in lowest position, wheels locked, appropriate side rails in place/Call bell, personal items and telephone in reach/Instruct patient to call for assistance before getting out of bed/chair/stretcher/Non-slip footwear applied when patient is off stretcher/[place] to call system/Physically safe environment - no spills, clutter or unnecessary equipment/Purposeful proactive rounding/Room/bathroom lighting operational, light cord in reach

## 2025-01-01 NOTE — PATIENT PROFILE ADULT - NSPRONUTRITIONRISK_GEN_A_NUR
Problem: RESPIRATORY - ADULT  Goal: Achieves optimal ventilation and oxygenation  INTERVENTIONS:  - Assess for changes in respiratory status  - Assess for changes in mentation and behavior  - Position to facilitate oxygenation and minimize respiratory effo No indicators present

## 2025-01-01 NOTE — ED PROVIDER NOTE - ATTENDING CONTRIBUTION TO CARE
50 yo F with h/o sickle cell disease who presents to the ED c/o bilateral knee pain as well as L upper thigh pain. She reports these symptoms are c/w prior pain crises. Took PO oxycontin at home without much relief, prompting her visit to the ED today. No fever, chills, chest pain, SOB. No other infectious symptoms. Most likely pain crisis. Plan for labs, pain control

## 2025-01-01 NOTE — ED PROVIDER NOTE - PHYSICAL EXAMINATION
Physical Exam:  General: NAD, Conversive  Eyes: EOMI, Conjunctiva and sclera clear  Neck: No JVD  Lungs: Clear to auscultation bilaterally, no wheeze, no rhonchi  Heart: Normal S1, S2, no murmurs  Abdomen: Soft, nontender, nondistended, no CVA tenderness  Extremities: 2+ peripheral pulses, no edema, no calf tenderness  Psych: AAO X3  Neurologic: Non-focal

## 2025-01-01 NOTE — PATIENT PROFILE ADULT - FALL HARM RISK - HARM RISK INTERVENTIONS

## 2025-01-01 NOTE — H&P ADULT - NSHPREVIEWOFSYSTEMS_GEN_ALL_CORE
Review of Systems:   CONSTITUTIONAL: No fever, weight loss, or fatigue  EYES: No eye pain, visual disturbances, or discharge  ENMT:  No difficulty hearing, tinnitus, vertigo; No sinus or throat pain  NECK: No pain or stiffness  RESPIRATORY: No cough, wheezing, chills or hemoptysis; No shortness of breath  CARDIOVASCULAR: No chest pain, palpitations, dizziness, or leg swelling  GASTROINTESTINAL: No abdominal or epigastric pain. No nausea, vomiting, or hematemesis; No diarrhea or constipation. No melena or hematochezia.  GENITOURINARY: No dysuria, frequency, hematuria, or incontinence  NEUROLOGICAL: No headaches, memory loss, loss of strength, numbness, or tremors  SKIN: No itching, burning, rashes, or lesions   LYMPH NODES: No enlarged glands  ENDOCRINE: No heat or cold intolerance; No hair loss  MUSCULOSKELETAL: No joint pain or swelling; No muscle, back, or extremity pain  PSYCHIATRIC: No depression, anxiety, mood swings, or difficulty sleeping  HEME/LYMPH: No easy bruising, or bleeding gums  ALLERY AND IMMUNOLOGIC: No hives or eczema Review of Systems:   CONSTITUTIONAL: No fever, weight loss, or fatigue  EYES: No eye pain, visual disturbances, or discharge  ENMT:  No difficulty hearing, tinnitus, vertigo; No sinus or throat pain  NECK: No pain or stiffness  RESPIRATORY: No cough, wheezing, chills or hemoptysis; No shortness of breath  CARDIOVASCULAR: No chest pain, palpitations, dizziness, or leg swelling  GASTROINTESTINAL: No abdominal or epigastric pain. No nausea, vomiting, or hematemesis; No diarrhea but +constipation. No melena or hematochezia.  GENITOURINARY: No dysuria, frequency, hematuria, or incontinence  NEUROLOGICAL: No headaches, memory loss, loss of strength, numbness, or tremors  SKIN: No itching, burning, rashes, or lesions   LYMPH NODES: No enlarged glands  ENDOCRINE: No heat or cold intolerance; No hair loss  MUSCULOSKELETAL: +Leg pain bilaterally, +groin pain bilaterally  PSYCHIATRIC: No depression, anxiety, mood swings, or difficulty sleeping  HEME/LYMPH: No easy bruising, or bleeding gums  ALLERY AND IMMUNOLOGIC: No hives or eczema

## 2025-01-01 NOTE — ED PROVIDER NOTE - NS ED ROS FT
GENERAL: No fever or chills  EYES: No change in vision  HEENT: No trouble swallowing or speaking  CARDIAC: No chest pain  PULMONARY: No cough or SOB  GI: No abdominal pain, no nausea or no vomiting, no diarrhea or constipation  NEURO: No headache, no numbness  MSK: + joint pain no calf pain.  Otherwise as HPI or negative.

## 2025-01-01 NOTE — H&P ADULT - HISTORY OF PRESENT ILLNESS
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 Per the patient, she has been under a lot of stress lately. She called her hematologist (Dr. Salazar) to get refills of her medications (MSContin ER 60mg po BID and MSIR 60mg po Q6hrs PRN) but was told by the office that Dr. Salazar was on vacation and there was no one else available to prescribe her medications. She tried to ration her remaining medications as best as possible but the pain in her legs and groin persisted so she decided to come to the ED for further evaluation and management. Per the patient, she often has pain in her legs when she gets sickle cell crises but that is not always. She denies pain elsewhere. She also denies CP, SOB, palpitations, or dizziness. She denies any sick contacts and feels that her current crisis is stress-induced. She reports that her BP normally runs around 100/60.     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 Per the patient, she has been under a lot of stress lately. She called her hematologist (Dr. Salazar) to get refills of her medications (MSContin ER 60mg po BID and MSIR 60mg po Q6hrs PRN) but was told by the office that Dr. Salazar was on vacation and there was no one else available to prescribe her medications. She tried to ration her remaining medications as best as possible but the pain in her legs and groin persisted so she decided to come to the ED for further evaluation and management. Per the patient, she often has pain in her legs when she gets sickle cell crises but that is not always. She denies pain elsewhere. She also denies CP, SOB, palpitations, or dizziness or LE edema. She denies any sick contacts, URI symptoms, n/v/d, dysuria, or other sources of illness. Ms. Moody feels that her current crisis is stress-induced. She reports that her BP normally runs around 100/60. Last BM was on Sunday.    In the ED: 99, 75, 100/61, 98%RA  Received: Toradol 15mg IV x1, Dilaudid 1mg IV x1, Dilaudid 2mg IV x3, Benadryl 25mg IV x1, Benadryl 25mg po x1    ***[place] [facility] Reference #: [id number]***

## 2025-01-01 NOTE — H&P ADULT - ASSESSMENT
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

## 2025-01-01 NOTE — ED ADULT NURSE NOTE - OBJECTIVE STATEMENT
49 YOF with a PMH of SCC presents c/o BLE pain. Pt denies any recent falls or injuries. Pt reports pain started this morning. +pulse, motor, sensation and strength. No numbness or tingling. No obvious signs of injury noted. Respirations even and unlabored. Pt denies any chest pain, sob, dizziness, N/V/D or fevers. 22g IV placed in L hand. Labs collected. Medication administered per order for pain. Pending x-ray. bed in lowest position, side rails up, call bell in hand, safety maintained. g

## 2025-01-01 NOTE — H&P ADULT - NSHPLABSRESULTS_GEN_ALL_CORE
7.6    5.38  )-----------( 239      ( 01 Jan 2025 08:09 )             22.0     01-01    139  |  105  |  16  ----------------------------<  117[H]  4.6   |  22  |  0.88    Ca    9.5      01 Jan 2025 08:09    TPro  8.1  /  Alb  4.2  /  TBili  1.9[H]  /  DBili  x   /  AST  46[H]  /  ALT  10  /  AlkPhos  55  01-01    Reticulocyte Count (01.01.25 @ 08:09)    RBC Count: 2.70 M/uL    Reticulocyte Percent: 3.2 %    Absolute Reticulocytes: 86.4 K/uL    < from: Xray Chest 1 View- PORTABLE-Urgent (01.01.25 @ 08:37) >    IMPRESSION:  Clear lungs.    < end of copied text >

## 2025-01-02 LAB
ALBUMIN SERPL ELPH-MCNC: 3.5 G/DL — SIGNIFICANT CHANGE UP (ref 3.3–5)
ALP SERPL-CCNC: 51 U/L — SIGNIFICANT CHANGE UP (ref 40–120)
ALT FLD-CCNC: 8 U/L — SIGNIFICANT CHANGE UP (ref 4–33)
ANION GAP SERPL CALC-SCNC: 11 MMOL/L — SIGNIFICANT CHANGE UP (ref 7–14)
AST SERPL-CCNC: 32 U/L — SIGNIFICANT CHANGE UP (ref 4–32)
BASOPHILS # BLD AUTO: 0.04 K/UL — SIGNIFICANT CHANGE UP (ref 0–0.2)
BASOPHILS NFR BLD AUTO: 0.8 % — SIGNIFICANT CHANGE UP (ref 0–2)
BILIRUB SERPL-MCNC: 1.3 MG/DL — HIGH (ref 0.2–1.2)
BLD GP AB SCN SERPL QL: NEGATIVE — SIGNIFICANT CHANGE UP
BUN SERPL-MCNC: 12 MG/DL — SIGNIFICANT CHANGE UP (ref 7–23)
CALCIUM SERPL-MCNC: 8.5 MG/DL — SIGNIFICANT CHANGE UP (ref 8.4–10.5)
CHLORIDE SERPL-SCNC: 107 MMOL/L — SIGNIFICANT CHANGE UP (ref 98–107)
CO2 SERPL-SCNC: 19 MMOL/L — LOW (ref 22–31)
CREAT SERPL-MCNC: 0.84 MG/DL — SIGNIFICANT CHANGE UP (ref 0.5–1.3)
EGFR: 86 ML/MIN/1.73M2 — SIGNIFICANT CHANGE UP
EOSINOPHIL # BLD AUTO: 0.36 K/UL — SIGNIFICANT CHANGE UP (ref 0–0.5)
EOSINOPHIL NFR BLD AUTO: 7.3 % — HIGH (ref 0–6)
GLUCOSE SERPL-MCNC: 113 MG/DL — HIGH (ref 70–99)
HAPTOGLOB SERPL-MCNC: <20 MG/DL — LOW (ref 34–200)
HCT VFR BLD CALC: 21.4 % — LOW (ref 34.5–45)
HCT VFR BLD CALC: 21.4 % — LOW (ref 34.5–45)
HGB BLD-MCNC: 6.9 G/DL — CRITICAL LOW (ref 11.5–15.5)
HGB BLD-MCNC: 7.1 G/DL — LOW (ref 11.5–15.5)
IANC: 1.18 K/UL — LOW (ref 1.8–7.4)
IMM GRANULOCYTES NFR BLD AUTO: 0.4 % — SIGNIFICANT CHANGE UP (ref 0–0.9)
LDH SERPL L TO P-CCNC: 341 U/L — HIGH (ref 135–225)
LYMPHOCYTES # BLD AUTO: 2.63 K/UL — SIGNIFICANT CHANGE UP (ref 1–3.3)
LYMPHOCYTES # BLD AUTO: 53.6 % — HIGH (ref 13–44)
MCHC RBC-ENTMCNC: 26.6 PG — LOW (ref 27–34)
MCHC RBC-ENTMCNC: 27.4 PG — SIGNIFICANT CHANGE UP (ref 27–34)
MCHC RBC-ENTMCNC: 32.2 G/DL — SIGNIFICANT CHANGE UP (ref 32–36)
MCHC RBC-ENTMCNC: 34.1 G/DL — SIGNIFICANT CHANGE UP (ref 32–36)
MCV RBC AUTO: 80.3 FL — SIGNIFICANT CHANGE UP (ref 80–100)
MCV RBC AUTO: 82.6 FL — SIGNIFICANT CHANGE UP (ref 80–100)
MONOCYTES # BLD AUTO: 0.68 K/UL — SIGNIFICANT CHANGE UP (ref 0–0.9)
MONOCYTES NFR BLD AUTO: 13.8 % — SIGNIFICANT CHANGE UP (ref 2–14)
NEUTROPHILS # BLD AUTO: 1.18 K/UL — LOW (ref 1.8–7.4)
NEUTROPHILS NFR BLD AUTO: 24.1 % — LOW (ref 43–77)
NRBC # BLD: 0 /100 WBCS — SIGNIFICANT CHANGE UP (ref 0–0)
NRBC # BLD: 0 /100 WBCS — SIGNIFICANT CHANGE UP (ref 0–0)
NRBC # FLD: 0.04 K/UL — HIGH (ref 0–0)
NRBC # FLD: 0.04 K/UL — HIGH (ref 0–0)
PLATELET # BLD AUTO: 205 K/UL — SIGNIFICANT CHANGE UP (ref 150–400)
PLATELET # BLD AUTO: 209 K/UL — SIGNIFICANT CHANGE UP (ref 150–400)
POTASSIUM SERPL-MCNC: 4.1 MMOL/L — SIGNIFICANT CHANGE UP (ref 3.5–5.3)
POTASSIUM SERPL-SCNC: 4.1 MMOL/L — SIGNIFICANT CHANGE UP (ref 3.5–5.3)
PROT SERPL-MCNC: 6.8 G/DL — SIGNIFICANT CHANGE UP (ref 6–8.3)
RBC # BLD: 2.59 M/UL — LOW (ref 3.8–5.2)
RBC # FLD: 13 % — SIGNIFICANT CHANGE UP (ref 10.3–14.5)
RBC # FLD: 13.7 % — SIGNIFICANT CHANGE UP (ref 10.3–14.5)
RETICS #: 90.9 K/UL — SIGNIFICANT CHANGE UP (ref 25–125)
RETICS/RBC NFR: 3.5 % — HIGH (ref 0.5–2.5)
RH IG SCN BLD-IMP: POSITIVE — SIGNIFICANT CHANGE UP
SODIUM SERPL-SCNC: 137 MMOL/L — SIGNIFICANT CHANGE UP (ref 135–145)
WBC # BLD: 4.91 K/UL — SIGNIFICANT CHANGE UP (ref 3.8–10.5)
WBC # BLD: 5.04 K/UL — SIGNIFICANT CHANGE UP (ref 3.8–10.5)
WBC # FLD AUTO: 4.91 K/UL — SIGNIFICANT CHANGE UP (ref 3.8–10.5)
WBC # FLD AUTO: 5.04 K/UL — SIGNIFICANT CHANGE UP (ref 3.8–10.5)

## 2025-01-02 PROCEDURE — 93970 EXTREMITY STUDY: CPT | Mod: 26

## 2025-01-02 PROCEDURE — 99232 SBSQ HOSP IP/OBS MODERATE 35: CPT

## 2025-01-02 RX ORDER — CHLORHEXIDINE GLUCONATE 1.2 MG/ML
1 RINSE ORAL DAILY
Refills: 0 | Status: DISCONTINUED | OUTPATIENT
Start: 2025-01-02 | End: 2025-01-03

## 2025-01-02 RX ORDER — MORPHINE SULFATE 15 MG
60 TABLET, EXTENDED RELEASE ORAL
Refills: 0 | Status: DISCONTINUED | OUTPATIENT
Start: 2025-01-02 | End: 2025-01-03

## 2025-01-02 RX ADMIN — Medication 1 MILLIGRAM(S): at 11:35

## 2025-01-02 RX ADMIN — Medication 30 MILLILITER(S): at 08:34

## 2025-01-02 RX ADMIN — Medication 60 MILLIGRAM(S): at 17:04

## 2025-01-02 RX ADMIN — Medication 60 MILLIGRAM(S): at 17:34

## 2025-01-02 RX ADMIN — CHLORHEXIDINE GLUCONATE 1 APPLICATION(S): 1.2 RINSE ORAL at 17:06

## 2025-01-02 RX ADMIN — HYDROXYUREA 500 MILLIGRAM(S): 500 CAPSULE ORAL at 03:08

## 2025-01-02 RX ADMIN — Medication 30 MILLILITER(S): at 12:01

## 2025-01-02 RX ADMIN — Medication 60 MILLIGRAM(S): at 07:01

## 2025-01-02 RX ADMIN — HYDROXYUREA 500 MILLIGRAM(S): 500 CAPSULE ORAL at 21:26

## 2025-01-02 RX ADMIN — SODIUM CHLORIDE 100 MILLILITER(S): 9 INJECTION, SOLUTION INTRAVENOUS at 11:37

## 2025-01-02 RX ADMIN — HYDROXYUREA 500 MILLIGRAM(S): 500 CAPSULE ORAL at 11:35

## 2025-01-02 RX ADMIN — Medication 30 MILLILITER(S): at 20:14

## 2025-01-02 NOTE — PROGRESS NOTE ADULT - SUBJECTIVE AND OBJECTIVE BOX
Dr. Sherron Hernandez  Pager [phone]    PROGRESS NOTE:     Patient is a 49y old  Female who presents with a chief complaint of Severe Leg Pain (01 Jan 2025 13:07)      SUBJECTIVE / OVERNIGHT EVENTS: pt reports pain improved, has pains in her legs mainly right leg and low back  ADDITIONAL REVIEW OF SYSTEMS: afebrile , no chest pain/sob     MEDICATIONS  (STANDING):  chlorhexidine 2% Cloths 1 Application(s) Topical daily  enoxaparin Injectable 40 milliGRAM(s) SubCutaneous every 24 hours  folic acid 1 milliGRAM(s) Oral daily  HYDROmorphone PCA (1 mG/mL) 30 milliLiter(s) PCA Continuous PCA Continuous  hydroxyurea (Non - oncologic) 500 milliGRAM(s) Oral three times a day  influenza   Vaccine 0.5 milliLiter(s) IntraMuscular once  morphine ER Tablet 60 milliGRAM(s) Oral two times a day  polyethylene glycol 3350 17 Gram(s) Oral two times a day  senna 2 Tablet(s) Oral at bedtime  sodium chloride 0.45%. 1000 milliLiter(s) (100 mL/Hr) IV Continuous <Continuous>    MEDICATIONS  (PRN):  diphenhydrAMINE 50 milliGRAM(s) Oral every 6 hours PRN Pruritus  melatonin 3 milliGRAM(s) Oral at bedtime PRN Insomnia  naloxone Injectable 0.1 milliGRAM(s) IV Push every 3 minutes PRN For ANY of the following changes in patient status:  A. RR LESS THAN 10 breaths per minute, B. Oxygen saturation LESS THAN 90%, C. Sedation score of 6  ondansetron Injectable 4 milliGRAM(s) IV Push every 8 hours PRN Nausea and/or Vomiting      CAPILLARY BLOOD GLUCOSE        I&O's Summary      PHYSICAL EXAM:  Vital Signs Last 24 Hrs  T(C): 36.8 (02 Jan 2025 09:46), Max: 37 (01 Jan 2025 12:53)  T(F): 98.3 (02 Jan 2025 09:46), Max: 98.6 (01 Jan 2025 12:53)  HR: 100 (02 Jan 2025 09:46) (62 - 100)  BP: 103/53 (02 Jan 2025 09:46) (93/54 - 110/59)  BP(mean): --  RR: 17 (02 Jan 2025 09:46) (16 - 17)  SpO2: 100% (02 Jan 2025 09:46) (98% - 100%)    Parameters below as of 02 Jan 2025 09:46  Patient On (Oxygen Delivery Method): room air      CONSTITUTIONAL:  nad   RESPIRATORY: Normal respiratory effort; lungs are clear to auscultation bilaterally  CARDIOVASCULAR: Regular rate and rhythm, normal S1 and S2, no murmur/rub/gallop; No lower extremity edema; Peripheral pulses are 2+ bilaterally  ABDOMEN: Nontender to palpation, normoactive bowel sounds, no rebound/guarding; No hepatosplenomegaly  MUSCULOSKELETAL: no clubbing or cyanosis of digits; no joint swelling or tenderness to palpation  PSYCH: A+O to person, place, and time; affect appropriate    LABS:                        7.1    5.04  )-----------( 209      ( 02 Jan 2025 05:01 )             21.4     01-02    137  |  107  |  12  ----------------------------<  113[H]  4.1   |  19[L]  |  0.84    Ca    8.5      02 Jan 2025 03:10    TPro  6.8  /  Alb  3.5  /  TBili  1.3[H]  /  DBili  x   /  AST  32  /  ALT  8   /  AlkPhos  51  01-02          Urinalysis Basic - ( 02 Jan 2025 03:10 )    Color: x / Appearance: x / SG: x / pH: x  Gluc: 113 mg/dL / Ketone: x  / Bili: x / Urobili: x   Blood: x / Protein: x / Nitrite: x   Leuk Esterase: x / RBC: x / WBC x   Sq Epi: x / Non Sq Epi: x / Bacteria: x      RADIOLOGY & ADDITIONAL TESTS:  Results Reviewed:   Imaging Personally Reviewed:  < from: US Duplex Venous Lower Ext Complete, Bilateral (01.02.25 @ 09:05) >  IMPRESSION:  No evidence of deep venous thrombosis in either lower extremity.    < from: Xray Chest 1 View- PORTABLE-Urgent (01.01.25 @ 08:37) >  IMPRESSION:  Clear lungs.    Electrocardiogram Personally Reviewed:    COORDINATION OF CARE:  Care Discussed with Consultants/Other Providers [Y/N]:  Prior or Outpatient Records Reviewed [Y/N]:

## 2025-01-02 NOTE — PROGRESS NOTE ADULT - PROBLEM SELECTOR PLAN 1
Patient complains of pain in her LE b/L, which is similar in nature to prior sickle cell pain crises. No CP or SOB or recent illness. Patient feels current crisis is related to stress and running out of medications  -- c/w Dilaudid PCA (0.4mg IV demand dose, 6 minute lockout, 4hr limit of 6mg)  - no sign of acute chest syndrome, CXR clear lungs on admission   -PRN Narcan  -Continue home MSContin ER 60mg po BID  -Bowel regimen  -Continue home Hydrea 500mg po TID  -Continue home Folic Acid 1mg daily  -BP borderline, s/p 1L fluid bolus, cont 1/2NS at 100cc/hr for hydration  -Incentive spirometry  -LE dopplers neg for DVT  - Dispo: DC plan home pending clinical improvement Patient complains of pain in her LE b/L, which is similar in nature to prior sickle cell pain crises. No CP or SOB or recent illness. Patient feels current crisis is related to stress and running out of medications  -- c/w Dilaudid PCA (0.4mg IV demand dose, 6 minute lockout, 4hr limit of 6mg)  - no sign of acute chest syndrome, CXR clear lungs on admission   -PRN Narcan  -Continue home MSContin ER 60mg po BID  -Bowel regimen  -Continue home Hydrea 500mg po TID  -Continue home Folic Acid 1mg daily  -BP borderline, s/p 1L fluid bolus, cont 1/2NS at 100cc/hr for hydration  -Incentive spirometry  -LE dopplers neg for DVT  - Dispo: DC plan home pending clinical improvement, outpt f/up hematology Dr. Estrella Salazar

## 2025-01-03 ENCOUNTER — TRANSCRIPTION ENCOUNTER (OUTPATIENT)
Age: 50
End: 2025-01-03

## 2025-01-03 VITALS
HEART RATE: 70 BPM | OXYGEN SATURATION: 100 % | RESPIRATION RATE: 17 BRPM | TEMPERATURE: 98 F | DIASTOLIC BLOOD PRESSURE: 83 MMHG | SYSTOLIC BLOOD PRESSURE: 133 MMHG

## 2025-01-03 LAB
ANION GAP SERPL CALC-SCNC: 12 MMOL/L — SIGNIFICANT CHANGE UP (ref 7–14)
BUN SERPL-MCNC: 11 MG/DL — SIGNIFICANT CHANGE UP (ref 7–23)
CALCIUM SERPL-MCNC: 8.7 MG/DL — SIGNIFICANT CHANGE UP (ref 8.4–10.5)
CHLORIDE SERPL-SCNC: 106 MMOL/L — SIGNIFICANT CHANGE UP (ref 98–107)
CO2 SERPL-SCNC: 20 MMOL/L — LOW (ref 22–31)
CREAT SERPL-MCNC: 0.74 MG/DL — SIGNIFICANT CHANGE UP (ref 0.5–1.3)
EGFR: 100 ML/MIN/1.73M2 — SIGNIFICANT CHANGE UP
GLUCOSE SERPL-MCNC: 85 MG/DL — SIGNIFICANT CHANGE UP (ref 70–99)
HCT VFR BLD CALC: 21.2 % — LOW (ref 34.5–45)
HGB BLD-MCNC: 7.4 G/DL — LOW (ref 11.5–15.5)
MAGNESIUM SERPL-MCNC: 1.8 MG/DL — SIGNIFICANT CHANGE UP (ref 1.6–2.6)
MCHC RBC-ENTMCNC: 27.8 PG — SIGNIFICANT CHANGE UP (ref 27–34)
MCHC RBC-ENTMCNC: 34.9 G/DL — SIGNIFICANT CHANGE UP (ref 32–36)
MCV RBC AUTO: 79.7 FL — LOW (ref 80–100)
MRSA PCR RESULT.: SIGNIFICANT CHANGE UP
NRBC # BLD: 1 /100 WBCS — HIGH (ref 0–0)
NRBC # FLD: 0.06 K/UL — HIGH (ref 0–0)
PHOSPHATE SERPL-MCNC: 3.5 MG/DL — SIGNIFICANT CHANGE UP (ref 2.5–4.5)
PLATELET # BLD AUTO: 211 K/UL — SIGNIFICANT CHANGE UP (ref 150–400)
POTASSIUM SERPL-MCNC: 4.7 MMOL/L — SIGNIFICANT CHANGE UP (ref 3.5–5.3)
POTASSIUM SERPL-SCNC: 4.7 MMOL/L — SIGNIFICANT CHANGE UP (ref 3.5–5.3)
RBC # BLD: 2.66 M/UL — LOW (ref 3.8–5.2)
RBC # FLD: 13 % — SIGNIFICANT CHANGE UP (ref 10.3–14.5)
S AUREUS DNA NOSE QL NAA+PROBE: SIGNIFICANT CHANGE UP
SODIUM SERPL-SCNC: 138 MMOL/L — SIGNIFICANT CHANGE UP (ref 135–145)
WBC # BLD: 6.25 K/UL — SIGNIFICANT CHANGE UP (ref 3.8–10.5)
WBC # FLD AUTO: 6.25 K/UL — SIGNIFICANT CHANGE UP (ref 3.8–10.5)

## 2025-01-03 PROCEDURE — 99239 HOSP IP/OBS DSCHRG MGMT >30: CPT

## 2025-01-03 RX ORDER — NALOXONE HCL 0.4 MG/ML
1 VIAL (ML) INJECTION
Qty: 1 | Refills: 0
Start: 2025-01-03

## 2025-01-03 RX ORDER — POLYETHYLENE GLYCOL 3350 17 G/DOSE
17 POWDER (GRAM) ORAL
Qty: 0 | Refills: 0 | DISCHARGE
Start: 2025-01-03

## 2025-01-03 RX ADMIN — Medication 1 MILLIGRAM(S): at 12:37

## 2025-01-03 RX ADMIN — CHLORHEXIDINE GLUCONATE 1 APPLICATION(S): 1.2 RINSE ORAL at 12:36

## 2025-01-03 RX ADMIN — Medication 60 MILLIGRAM(S): at 06:03

## 2025-01-03 RX ADMIN — Medication 60 MILLIGRAM(S): at 06:33

## 2025-01-03 RX ADMIN — Medication 30 MILLILITER(S): at 08:30

## 2025-01-03 RX ADMIN — Medication 30 MILLILITER(S): at 12:08

## 2025-01-03 RX ADMIN — HYDROXYUREA 500 MILLIGRAM(S): 500 CAPSULE ORAL at 16:03

## 2025-01-03 RX ADMIN — HYDROXYUREA 500 MILLIGRAM(S): 500 CAPSULE ORAL at 06:04

## 2025-01-03 NOTE — DISCHARGE NOTE PROVIDER - CARE PROVIDER_API CALL
Estrella Salazar  Medical Oncology  [address], [address]  [place] [address]  Phone: [phone]  Fax: [phone]  Follow Up Time: 1 week

## 2025-01-03 NOTE — PROGRESS NOTE ADULT - ASSESSMENT
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 
50 yo F with HbSS disease who presents to the ED for severe leg and groin pain bilaterally concerning for acute sickle cell pain crisis.

## 2025-01-03 NOTE — DISCHARGE NOTE PROVIDER - NSDCCPCAREPLAN_GEN_ALL_CORE_FT
PRINCIPAL DISCHARGE DIAGNOSIS  Diagnosis: Sickle cell pain crisis  Assessment and Plan of Treatment: Continue with your prescribed Hydroxyurea as directed, as well as, pain medications as needed, bowel regimen as needed for constipation, and vitamin/mineral supplementation. Follow-up with your hematologist as outpatient for further care/recommendations. Please call to make an appointment. Monitor for signs/symptoms indicating worsening of disease, such as, easy bleeding/bruising, pale skin, fatigue, dizziness, increased heart rate, or chest pain.  Stay hydrated with water.

## 2025-01-03 NOTE — PROGRESS NOTE ADULT - SUBJECTIVE AND OBJECTIVE BOX
Dr. Sherron Hernandez  Pager [phone]    PROGRESS NOTE:     Patient is a 49y old  Female who presents with a chief complaint of Severe Leg Pain (03 Jan 2025 12:17)      SUBJECTIVE / OVERNIGHT EVENTS: denies chest pain or sob   ADDITIONAL REVIEW OF SYSTEMS: afebrile , pain controlled, wants to go home today    MEDICATIONS  (STANDING):  chlorhexidine 2% Cloths 1 Application(s) Topical daily  enoxaparin Injectable 40 milliGRAM(s) SubCutaneous every 24 hours  folic acid 1 milliGRAM(s) Oral daily  HYDROmorphone PCA (1 mG/mL) 30 milliLiter(s) PCA Continuous PCA Continuous  hydroxyurea (Non - oncologic) 500 milliGRAM(s) Oral three times a day  influenza   Vaccine 0.5 milliLiter(s) IntraMuscular once  morphine ER Tablet 60 milliGRAM(s) Oral two times a day  polyethylene glycol 3350 17 Gram(s) Oral two times a day  senna 2 Tablet(s) Oral at bedtime  sodium chloride 0.45%. 1000 milliLiter(s) (100 mL/Hr) IV Continuous <Continuous>    MEDICATIONS  (PRN):  diphenhydrAMINE 50 milliGRAM(s) Oral every 6 hours PRN Pruritus  melatonin 3 milliGRAM(s) Oral at bedtime PRN Insomnia  naloxone Injectable 0.1 milliGRAM(s) IV Push every 3 minutes PRN For ANY of the following changes in patient status:  A. RR LESS THAN 10 breaths per minute, B. Oxygen saturation LESS THAN 90%, C. Sedation score of 6  ondansetron Injectable 4 milliGRAM(s) IV Push every 8 hours PRN Nausea and/or Vomiting      CAPILLARY BLOOD GLUCOSE        I&O's Summary    02 Jan 2025 07:01  -  03 Jan 2025 07:00  --------------------------------------------------------  IN: 2300 mL / OUT: 0 mL / NET: 2300 mL    03 Jan 2025 07:01  -  03 Jan 2025 13:07  --------------------------------------------------------  IN: 250 mL / OUT: 0 mL / NET: 250 mL        PHYSICAL EXAM:  Vital Signs Last 24 Hrs  T(C): 36.9 (03 Jan 2025 10:12), Max: 37.4 (03 Jan 2025 01:28)  T(F): 98.4 (03 Jan 2025 10:12), Max: 99.3 (03 Jan 2025 01:28)  HR: 70 (03 Jan 2025 10:12) (67 - 80)  BP: 100/67 (03 Jan 2025 10:12) (100/61 - 111/61)  BP(mean): --  RR: 17 (03 Jan 2025 10:12) (16 - 18)  SpO2: 100% (03 Jan 2025 10:12) (98% - 100%)    Parameters below as of 03 Jan 2025 10:12  Patient On (Oxygen Delivery Method): room air      CONSTITUTIONAL:  nad   RESPIRATORY: Normal respiratory effort; lungs are clear to auscultation bilaterally  CARDIOVASCULAR: Regular rate and rhythm, normal S1 and S2, no murmur/rub/gallop; No lower extremity edema; Peripheral pulses are 2+ bilaterally  ABDOMEN: Nontender to palpation, normoactive bowel sounds, no rebound/guarding; No hepatosplenomegaly  MUSCULOSKELETAL: no clubbing or cyanosis of digits; no joint swelling or tenderness to palpation  PSYCH: A+O to person, place, and time; affect appropriate    LABS:                        7.4    6.25  )-----------( 211      ( 03 Jan 2025 03:20 )             21.2     01-03    138  |  106  |  11  ----------------------------<  85  4.7   |  20[L]  |  0.74    Ca    8.7      03 Jan 2025 03:20  Phos  3.5     01-03  Mg     1.80     01-03    TPro  6.8  /  Alb  3.5  /  TBili  1.3[H]  /  DBili  x   /  AST  32  /  ALT  8   /  AlkPhos  51  01-02          Urinalysis Basic - ( 03 Jan 2025 03:20 )    Color: x / Appearance: x / SG: x / pH: x  Gluc: 85 mg/dL / Ketone: x  / Bili: x / Urobili: x   Blood: x / Protein: x / Nitrite: x   Leuk Esterase: x / RBC: x / WBC x   Sq Epi: x / Non Sq Epi: x / Bacteria: x          RADIOLOGY & ADDITIONAL TESTS:  Results Reviewed:   Imaging Personally Reviewed:  Electrocardiogram Personally Reviewed:    COORDINATION OF CARE:  Care Discussed with Consultants/Other Providers [Y/N]:  Prior or Outpatient Records Reviewed [Y/N]:

## 2025-01-03 NOTE — DISCHARGE NOTE NURSING/CASE MANAGEMENT/SOCIAL WORK - FINANCIAL ASSISTANCE
[facility] provides services at a reduced cost to those who are determined to be eligible through [facility]’s financial assistance program. Information regarding [facility]’s financial assistance program can be found by going to [email or web address] or by calling [phone].

## 2025-01-03 NOTE — DISCHARGE NOTE PROVIDER - HOSPITAL COURSE
Hospital Course    44 yo F h/o Ss dz c/o with severe pain in her legs and groin bilaterally concerning for her SS  pain crisi. Pt admitted with sickle cell crisis. right hip xray, chest xray, MRSA/ MSSA , RVP venous duplex all negative , no acute findings.  Pt treated with PCA Dilaudid, IVF hydration, Ms Contin, folic acid,  hydroxyurea continued. Pt hgb stable at 7.4, at baseline. No transfusion needed. Pt pain improved. Pt stable for discharge home. Pt to f/u with PCP/Hematologist.   On 1/3/2024  case was discussed with Dr Hernandez, patient is medically cleared and optimized for discharge today. All medications were reviewed with Attending, and patient 's Hematology doctor already sent her meds to [facility] pharmacy and patient to set up follow up appointment with her Hematology doctor .

## 2025-01-03 NOTE — DISCHARGE NOTE NURSING/CASE MANAGEMENT/SOCIAL WORK - PATIENT PORTAL LINK FT
You can access the FollowMyHealth Patient Portal offered by [facility] by registering at the following website: [email or web address]. By joining [employer]’s FollowMyHealth portal, you will also be able to view your health information using other applications (apps) compatible with our system.

## 2025-01-03 NOTE — DISCHARGE NOTE PROVIDER - NSDCMRMEDTOKEN_GEN_ALL_CORE_FT
folic acid 1 mg oral tablet: 1 tab(s) orally once a day  hydroxyurea 500 mg oral capsule: 1 cap(s) orally 3 times a day  MS Contin 60 mg oral tablet, extended release: 1 tab(s) orally 2 times a day  polyethylene glycol 3350 oral powder for reconstitution: 17 gram(s) orally 2 times a day  Senna 8.6 mg oral tablet: 2 tab(s) orally once a day (at bedtime), As Needed   folic acid 1 mg oral tablet: 1 tab(s) orally once a day  hydroxyurea 500 mg oral capsule: 1 cap(s) orally 3 times a day  morphine 30 mg oral tablet: 2 tab(s) orally every 6 hours as needed for  severe pain MDD: 4  MS Contin 60 mg oral tablet, extended release: 1 tab(s) orally 2 times a day  Narcan 4 mg/0.1 mL nasal spray: 1 spray(s) in the left nostril once use if patient not responsive , may repeat in 2 minutes if not working  polyethylene glycol 3350 oral powder for reconstitution: 17 gram(s) orally 2 times a day  Senna 8.6 mg oral tablet: 2 tab(s) orally once a day (at bedtime), As Needed

## 2025-01-03 NOTE — PROGRESS NOTE ADULT - PROBLEM SELECTOR PLAN 1
Patient complains of pain in her LE b/L, which is similar in nature to prior sickle cell pain crises. No CP or SOB or recent illness. Patient feels current crisis is related to stress and running out of medications  -- c/w Dilaudid PCA (0.4mg IV demand dose, 6 minute lockout, 4hr limit of 6mg)  - no sign of acute chest syndrome, CXR clear lungs on admission   -Continue home MSContin ER 60mg po BID  -Bowel regimen  -Continue home Hydrea 500mg po TID  -Continue home Folic Acid 1mg daily  - IVF 1/2 NS  -Incentive spirometry  -LE dopplers neg for DVT  - Dispo: DC plan home today, outpt f/up appt next Tuesday with her hematologist Dr. Estrella Salazar, endorses morphine was called in to her pharmacy.  Time spent on discharge 31 minutes coordinating discharge plan and discussing with patient and family.